# Patient Record
Sex: FEMALE | Race: BLACK OR AFRICAN AMERICAN | NOT HISPANIC OR LATINO | Employment: UNEMPLOYED | URBAN - METROPOLITAN AREA
[De-identification: names, ages, dates, MRNs, and addresses within clinical notes are randomized per-mention and may not be internally consistent; named-entity substitution may affect disease eponyms.]

---

## 2017-11-13 ENCOUNTER — TRANSCRIBE ORDERS (OUTPATIENT)
Dept: LAB | Facility: CLINIC | Age: 52
End: 2017-11-13

## 2017-11-13 ENCOUNTER — APPOINTMENT (OUTPATIENT)
Dept: LAB | Facility: CLINIC | Age: 52
End: 2017-11-13
Payer: COMMERCIAL

## 2017-11-13 ENCOUNTER — GENERIC CONVERSION - ENCOUNTER (OUTPATIENT)
Dept: OTHER | Facility: OTHER | Age: 52
End: 2017-11-13

## 2017-11-13 DIAGNOSIS — I10 ESSENTIAL (PRIMARY) HYPERTENSION: ICD-10-CM

## 2017-11-13 DIAGNOSIS — D50.9 IRON DEFICIENCY ANEMIA: ICD-10-CM

## 2017-11-13 LAB
ALBUMIN SERPL BCP-MCNC: 4.2 G/DL (ref 3.5–5)
ALP SERPL-CCNC: 64 U/L (ref 46–116)
ALT SERPL W P-5'-P-CCNC: 16 U/L (ref 12–78)
ANION GAP SERPL CALCULATED.3IONS-SCNC: 10 MMOL/L (ref 4–13)
AST SERPL W P-5'-P-CCNC: 14 U/L (ref 5–45)
BASOPHILS # BLD AUTO: 0.02 THOUSANDS/ΜL (ref 0–0.1)
BASOPHILS NFR BLD AUTO: 0 % (ref 0–1)
BILIRUB SERPL-MCNC: 0.49 MG/DL (ref 0.2–1)
BUN SERPL-MCNC: 12 MG/DL (ref 5–25)
CALCIUM SERPL-MCNC: 9.6 MG/DL (ref 8.3–10.1)
CHLORIDE SERPL-SCNC: 104 MMOL/L (ref 100–108)
CHOLEST SERPL-MCNC: 190 MG/DL (ref 50–200)
CO2 SERPL-SCNC: 22 MMOL/L (ref 21–32)
CREAT SERPL-MCNC: 0.84 MG/DL (ref 0.6–1.3)
EOSINOPHIL # BLD AUTO: 0.16 THOUSAND/ΜL (ref 0–0.61)
EOSINOPHIL NFR BLD AUTO: 3 % (ref 0–6)
ERYTHROCYTE [DISTWIDTH] IN BLOOD BY AUTOMATED COUNT: 13.8 % (ref 11.6–15.1)
GFR SERPL CREATININE-BSD FRML MDRD: 93 ML/MIN/1.73SQ M
GLUCOSE P FAST SERPL-MCNC: 78 MG/DL (ref 65–99)
HCT VFR BLD AUTO: 39 % (ref 34.8–46.1)
HDLC SERPL-MCNC: 58 MG/DL (ref 40–60)
HGB BLD-MCNC: 12.4 G/DL (ref 11.5–15.4)
LDLC SERPL CALC-MCNC: 120 MG/DL (ref 0–100)
LYMPHOCYTES # BLD AUTO: 1.22 THOUSANDS/ΜL (ref 0.6–4.47)
LYMPHOCYTES NFR BLD AUTO: 22 % (ref 14–44)
MCH RBC QN AUTO: 25.6 PG (ref 26.8–34.3)
MCHC RBC AUTO-ENTMCNC: 31.8 G/DL (ref 31.4–37.4)
MCV RBC AUTO: 81 FL (ref 82–98)
MONOCYTES # BLD AUTO: 0.44 THOUSAND/ΜL (ref 0.17–1.22)
MONOCYTES NFR BLD AUTO: 8 % (ref 4–12)
NEUTROPHILS # BLD AUTO: 3.68 THOUSANDS/ΜL (ref 1.85–7.62)
NEUTS SEG NFR BLD AUTO: 67 % (ref 43–75)
NRBC BLD AUTO-RTO: 0 /100 WBCS
PLATELET # BLD AUTO: 315 THOUSANDS/UL (ref 149–390)
PMV BLD AUTO: 12.7 FL (ref 8.9–12.7)
POTASSIUM SERPL-SCNC: 3.3 MMOL/L (ref 3.5–5.3)
PROT SERPL-MCNC: 8.8 G/DL (ref 6.4–8.2)
RBC # BLD AUTO: 4.84 MILLION/UL (ref 3.81–5.12)
SODIUM SERPL-SCNC: 136 MMOL/L (ref 136–145)
TRIGL SERPL-MCNC: 58 MG/DL
TSH SERPL DL<=0.05 MIU/L-ACNC: 1.3 UIU/ML (ref 0.36–3.74)
WBC # BLD AUTO: 5.53 THOUSAND/UL (ref 4.31–10.16)

## 2017-11-13 PROCEDURE — 36415 COLL VENOUS BLD VENIPUNCTURE: CPT

## 2017-11-13 PROCEDURE — 80053 COMPREHEN METABOLIC PANEL: CPT

## 2017-11-13 PROCEDURE — 85025 COMPLETE CBC W/AUTO DIFF WBC: CPT

## 2017-11-13 PROCEDURE — 84443 ASSAY THYROID STIM HORMONE: CPT

## 2017-11-13 PROCEDURE — 80061 LIPID PANEL: CPT

## 2017-12-16 ENCOUNTER — ALLSCRIPTS OFFICE VISIT (OUTPATIENT)
Dept: OTHER | Facility: OTHER | Age: 52
End: 2017-12-16

## 2017-12-17 NOTE — PROGRESS NOTES
Assessment  1  Uncontrolled hypertension (401 9) (I10)    Plan  Uncontrolled hypertension    · Amlodipine Besylate-Valsartan  MG Oral Tablet; Take 1 tablet daily    Discussion/Summary  Discussion Summary:   Hypertension -her blood pressure was still quite high  She has been taking the amlodipine  Valsartan 160 milligrams was added  She will come back in 2 months for recheck  Counseling Documentation With Imm: The patient was counseled regarding instructions for management,-- risk factor reductions,-- risks and benefits of treatment options,-- importance of compliance with treatment  Medication SE Review and Pt Understands Tx: Possible side effects of new medications were reviewed with the patient/guardian today  The treatment plan was reviewed with the patient/guardian  The patient/guardian understands and agrees with the treatment plan      Chief Complaint  Chief Complaint Free Text Note Form: BP fu      History of Present Illness  Hypertension (Follow-Up): The patient presents for follow-up of essential hypertension  The patient states she has been doing poorly with her blood pressure control since the last visit  She has no comorbid illnesses  She has no significant interval events  Symptoms: The patient is currently asymptomatic  Medications: the patient is adherent with her medication regimen  -- She denies medication side effects  Review of Systems  Complete-Female:  Constitutional: No fever, no chills, feels well, no tiredness, no recent weight gain or weight loss  Eyes: No complaints of eye pain, no red eyes, no eyesight problems, no discharge, no dry eyes, no itching of eyes  ENT: no complaints of earache, no loss of hearing, no nose bleeds, no nasal discharge, no sore throat, no hoarseness  Cardiovascular: No complaints of slow heart rate, no fast heart rate, no chest pain, no palpitations, no leg claudication, no lower extremity edema    Respiratory: No complaints of shortness of breath, no wheezing, no cough, no SOB on exertion, no orthopnea, no PND  Gastrointestinal: No complaints of abdominal pain, no constipation, no nausea or vomiting, no diarrhea, no bloody stools  Genitourinary: No complaints of dysuria, no incontinence, no pelvic pain, no dysmenorrhea, no vaginal discharge or bleeding  Musculoskeletal: No complaints of arthralgias, no myalgias, no joint swelling or stiffness, no limb pain or swelling  Integumentary: No complaints of skin rash or lesions, no itching, no skin wounds, no breast pain or lump  Neurological: No complaints of headache, no confusion, no convulsions, no numbness, no dizziness or fainting, no tingling, no limb weakness, no difficulty walking  Psychiatric: Not suicidal, no sleep disturbance, no anxiety or depression, no change in personality, no emotional problems  Endocrine: No complaints of proptosis, no hot flashes, no muscle weakness, no deepening of the voice, no feelings of weakness  Hematologic/Lymphatic: No complaints of swollen glands, no swollen glands in the neck, does not bleed easily, does not bruise easily  Active Problems  1  Benign essential hypertension (401 1) (I10)   2  Cataract (366 9) (H26 9)   3  Iron deficiency anemia (280 9) (D50 9)   4  Uncontrolled hypertension (401 9) (I10)    Past Medical History  1  History of Denial (799 29) (R45 89)  Active Problems And Past Medical History Reviewed: The active problems and past medical history were reviewed and updated today  Surgical History  1  Denied: History of Recent Surgery    Family History  Mother    1  Family history of hypertension (V17 49) (Z82 49)  Father    2  Family history of hypertension (V17 49) (Z82 49)    Social History   · No alcohol use   · Non-tobacco user (V49 89) (Z78 9)  Social History Reviewed: The social history was reviewed and updated today  The social history was reviewed and is unchanged  Current Meds   1   AmLODIPine Besylate 10 MG Oral Tablet; TAKE 1 TABLET DAILY; Therapy: [id number] to (Jason Jarvis)  Requested for: 12ULK1282; Last Rx:[id number] Ordered   2  Ferrous Sulfate 325 (65 Fe) MG Oral Tablet; TAKE ONE TABLET BY MOUTH TWICE DAILY; Therapy: [id number] to Recorded  Medication List Reviewed: The medication list was reviewed and updated today  Allergies  1  No Known Drug Allergies    Vitals  Vital Signs    Recorded: 28GPN3676 12:13PM   Temperature 98 F   Heart Rate 103   Respiration 16   Systolic 710   Diastolic 524   Height 5 ft 4 in   Weight 138 lb 4 00 oz   BMI Calculated 23 73   BSA Calculated 1 68   O2 Saturation 98     Physical Exam   Constitutional  General appearance: No acute distress, well appearing and well nourished  Eyes  Conjunctiva and lids: No swelling, erythema or discharge  Ears, Nose, Mouth, and Throat  External inspection of ears and nose: Normal    Oropharynx: Normal with no erythema, edema, exudate or lesions  Pulmonary  Respiratory effort: No increased work of breathing or signs of respiratory distress  Auscultation of lungs: Clear to auscultation  Cardiovascular  Palpation of heart: Normal PMI, no thrills  Auscultation of heart: Normal rate and rhythm, normal S1 and S2, without murmurs  Examination of extremities for edema and/or varicosities: Normal    Abdomen  Abdomen: Non-tender, no masses  Musculoskeletal  Gait and station: Normal    Skin  Skin and subcutaneous tissue: Normal without rashes or lesions  Neurologic  Cranial nerves: Cranial nerves 2-12 intact  Psychiatric  Orientation to person, place, and time: Normal    Mood and affect: Normal          Results/Data  Health Maintenance Flow Sheet [id number] 12:38PM      Test Name Result Flag Reference   Pap      faxed to [facility] K#:[id number]  Chloé Ventura        Future Appointments    Date/Time Provider Specialty Site   02/24/2018 10:30 AM Gabino Chamorro [facility] [address] Electronically signed by : NIMA Moe ; Dec 16 2017  2:08PM EST                       (Author)

## 2018-01-22 VITALS
HEIGHT: 64 IN | BODY MASS INDEX: 23.6 KG/M2 | HEART RATE: 106 BPM | DIASTOLIC BLOOD PRESSURE: 150 MMHG | RESPIRATION RATE: 16 BRPM | SYSTOLIC BLOOD PRESSURE: 252 MMHG | WEIGHT: 138.25 LBS

## 2018-01-22 VITALS
OXYGEN SATURATION: 98 % | TEMPERATURE: 98 F | HEIGHT: 64 IN | SYSTOLIC BLOOD PRESSURE: 210 MMHG | WEIGHT: 138.25 LBS | BODY MASS INDEX: 23.6 KG/M2 | HEART RATE: 103 BPM | RESPIRATION RATE: 16 BRPM | DIASTOLIC BLOOD PRESSURE: 110 MMHG

## 2018-02-24 ENCOUNTER — OFFICE VISIT (OUTPATIENT)
Dept: INTERNAL MEDICINE CLINIC | Facility: CLINIC | Age: 53
End: 2018-02-24
Payer: COMMERCIAL

## 2018-02-24 VITALS
HEART RATE: 88 BPM | BODY MASS INDEX: 25.12 KG/M2 | TEMPERATURE: 97.9 F | WEIGHT: 141.8 LBS | OXYGEN SATURATION: 99 % | SYSTOLIC BLOOD PRESSURE: 204 MMHG | DIASTOLIC BLOOD PRESSURE: 92 MMHG | HEIGHT: 63 IN

## 2018-02-24 DIAGNOSIS — I10 UNCONTROLLED HYPERTENSION: Primary | ICD-10-CM

## 2018-02-24 PROBLEM — D50.9 IRON DEFICIENCY ANEMIA: Status: ACTIVE | Noted: 2017-11-13

## 2018-02-24 PROBLEM — H26.9 CATARACT: Status: ACTIVE | Noted: 2017-11-13

## 2018-02-24 PROCEDURE — 99214 OFFICE O/P EST MOD 30 MIN: CPT | Performed by: PHYSICIAN ASSISTANT

## 2018-02-24 RX ORDER — METOPROLOL SUCCINATE 50 MG/1
50 TABLET, EXTENDED RELEASE ORAL DAILY
Qty: 30 TABLET | Refills: 5 | Status: SHIPPED | OUTPATIENT
Start: 2018-02-24 | End: 2018-03-31

## 2018-02-24 RX ORDER — LOSARTAN POTASSIUM 100 MG/1
1 TABLET ORAL DAILY
COMMUNITY
Start: 2017-12-20 | End: 2018-04-14

## 2018-02-24 RX ORDER — FERROUS SULFATE 325(65) MG
1 TABLET ORAL 2 TIMES DAILY
COMMUNITY
Start: 2017-11-13 | End: 2020-08-25 | Stop reason: SDUPTHER

## 2018-02-24 RX ORDER — AMLODIPINE BESYLATE 10 MG/1
1 TABLET ORAL DAILY
COMMUNITY
Start: 2017-11-13 | End: 2018-07-12 | Stop reason: SDUPTHER

## 2018-02-24 NOTE — PROGRESS NOTES
Assessment/Plan:    Uncontrolled hypertension-    The patient will be started on metoprolol ER 50 mg daily  She should come back here in 2-3 weeks for blood pressure check and bring in readings from home  Hopefully at that time we can clear her for cataract surgery  Continue current medications including amlodipine and losartan  The case was discussed with Dr Merle Boudreaux who agrees with the assessment and plan  No problem-specific Assessment & Plan notes found for this encounter  Diagnoses and all orders for this visit:    Uncontrolled hypertension  -     metoprolol succinate (TOPROL-XL) 50 mg 24 hr tablet; Take 1 tablet (50 mg total) by mouth daily    Other orders  -     amLODIPine (NORVASC) 10 mg tablet; Take 1 tablet by mouth daily  -     Ascorbic Acid, Vitamin C, (VITAMIN C) 100 MG tablet; 1 tab(s)  -     ferrous sulfate 325 (65 Fe) mg tablet; Take 1 tablet by mouth 2 (two) times a day  -     losartan (COZAAR) 100 MG tablet; Take 1 tablet by mouth daily          Subjective:      Patient ID: Charla Peres is a 47 y o  female  BP check  Pt was a new pt here in Nov and the saw Dr Merle Boudreaux again in Dec   Her BP has not been controlled  She come today for another BP check  She has been waiting to have cataract surgery but due to her high Bp's, she cannot be cleared yet  The following portions of the patient's history were reviewed and updated as appropriate: allergies, current medications, past family history, past medical history, past social history, past surgical history and problem list     Review of Systems   Constitutional: Negative for fatigue  HENT: Negative for sinus pain and sinus pressure  Respiratory: Negative for cough and chest tightness  Cardiovascular: Negative for chest pain and palpitations  Gastrointestinal: Negative for abdominal pain, diarrhea and nausea  Neurological: Positive for headaches  Negative for dizziness and light-headedness          Sometimes headaches         Objective:      BP (!) 204/92   Pulse (!) 107   Temp 97 9 °F (36 6 °C)   Ht 5' 3" (1 6 m)   Wt 64 3 kg (141 lb 12 8 oz)   SpO2 99%   BMI 25 12 kg/m²          Physical Exam   Constitutional: She appears well-developed and well-nourished  HENT:   Head: Normocephalic and atraumatic  Cardiovascular: Normal heart sounds  Tachycardia present  Pulmonary/Chest: Effort normal and breath sounds normal  No respiratory distress  Abdominal: Soft  Bowel sounds are normal  There is no tenderness

## 2018-03-31 ENCOUNTER — OFFICE VISIT (OUTPATIENT)
Dept: INTERNAL MEDICINE CLINIC | Facility: CLINIC | Age: 53
End: 2018-03-31
Payer: COMMERCIAL

## 2018-03-31 VITALS
OXYGEN SATURATION: 98 % | TEMPERATURE: 98.3 F | WEIGHT: 142.6 LBS | HEART RATE: 91 BPM | RESPIRATION RATE: 18 BRPM | SYSTOLIC BLOOD PRESSURE: 190 MMHG | HEIGHT: 63 IN | DIASTOLIC BLOOD PRESSURE: 110 MMHG | BODY MASS INDEX: 25.27 KG/M2

## 2018-03-31 DIAGNOSIS — I10 WHITE COAT SYNDROME WITH HYPERTENSION: ICD-10-CM

## 2018-03-31 DIAGNOSIS — I10 BENIGN ESSENTIAL HYPERTENSION: Primary | ICD-10-CM

## 2018-03-31 PROCEDURE — 99214 OFFICE O/P EST MOD 30 MIN: CPT | Performed by: INTERNAL MEDICINE

## 2018-03-31 RX ORDER — CARVEDILOL 12.5 MG/1
12.5 TABLET ORAL 2 TIMES DAILY WITH MEALS
Qty: 60 TABLET | Refills: 0 | Status: SHIPPED | OUTPATIENT
Start: 2018-03-31 | End: 2018-04-27 | Stop reason: SDUPTHER

## 2018-04-14 ENCOUNTER — OFFICE VISIT (OUTPATIENT)
Dept: INTERNAL MEDICINE CLINIC | Facility: CLINIC | Age: 53
End: 2018-04-14
Payer: COMMERCIAL

## 2018-04-14 VITALS
SYSTOLIC BLOOD PRESSURE: 158 MMHG | RESPIRATION RATE: 18 BRPM | DIASTOLIC BLOOD PRESSURE: 90 MMHG | HEART RATE: 70 BPM | BODY MASS INDEX: 25.87 KG/M2 | WEIGHT: 146 LBS | HEIGHT: 63 IN | OXYGEN SATURATION: 99 %

## 2018-04-14 DIAGNOSIS — H26.9 CATARACT OF BOTH EYES, UNSPECIFIED CATARACT TYPE: ICD-10-CM

## 2018-04-14 DIAGNOSIS — I15.9 SECONDARY HYPERTENSION: Primary | ICD-10-CM

## 2018-04-14 DIAGNOSIS — Z01.818 PREOPERATIVE CLEARANCE: ICD-10-CM

## 2018-04-14 PROCEDURE — 99244 OFF/OP CNSLTJ NEW/EST MOD 40: CPT | Performed by: PHYSICIAN ASSISTANT

## 2018-04-14 RX ORDER — VALSARTAN 320 MG/1
320 TABLET ORAL DAILY
Qty: 30 TABLET | Refills: 5 | Status: SHIPPED | OUTPATIENT
Start: 2018-04-14 | End: 2020-08-17

## 2018-04-14 NOTE — PROGRESS NOTES
Assessment/Plan:     hypertension- blood pressure is better controlled today  It does seem that she may have white coat hypertension  We will switch her losartan 100mg to valsartan 320 mg daily for a little bit better efficacy  Continue Coreg 12 5 mg b i d  And amlodipine 10 mg daily  Cataract surgery- there are no contraindications and the patient is cleared for cataract surgery  I will await any forms that I have to fill out  The patient will call her eye doctor, Dr Dulce Mosher, on Monday and obtain any forms that need to be filled out  We also do not have any dates of surgery since she says she has not even seen the eye doctor for this yet  As long as the surgeries occur within the next 30 days, this clearance should apply  the case was discussed with Dr Jhonny Sandoval who agrees with the assessment and plan      No problem-specific Assessment & Plan notes found for this encounter  Diagnoses and all orders for this visit:    Secondary hypertension  -     valsartan (DIOVAN) 320 MG tablet; Take 1 tablet (320 mg total) by mouth daily          Subjective:      Patient ID: Myla Andersen is a 47 y o  female  Patient comes in for a blood pressure check  She was seen here about 2 weeks ago where her metoprolol was stopped and she was   Put on Coreg 12 5 mg twice a day  She continues to take amlodipine  10 mg daily and losartan 100 mg daily  She has been waiting to have cataract surgery  Her surgery has been put off until her blood pressure was better controlled  According to the patient she has yet to even see the eye doctor, she says they told her they she needs to come here for clearance although she does not have any forms to give me from Dr Dulce Mosher  She has been checking her blood pressures at home and she says that she brought her machine in to be checked by Dr Flynn Livers a few visits ago    Her blood pressures have been running the following at home, 137/81, 132/82, 138/85, 140/89, 106/71, 112/78, 129/77, 134/79, 127/79     here her blood pressure was elevated more at 1st, but on repeat it was  158/90      she feels well and has no acute complaints  The only complaint she has is her vision which she is anxious to get corrected by cataract surgery      Hypertension   This is a chronic problem  The current episode started more than 1 month ago  The problem has been waxing and waning since onset  Pertinent negatives include no chest pain, palpitations or shortness of breath  The following portions of the patient's history were reviewed and updated as appropriate: allergies, current medications, past family history, past medical history, past social history, past surgical history and problem list     Review of Systems   Constitutional: Negative for fatigue and fever  HENT: Negative for congestion  Eyes: Positive for visual disturbance  Respiratory: Negative for cough, chest tightness and shortness of breath  Cardiovascular: Negative for chest pain and palpitations  Gastrointestinal: Negative for abdominal pain, diarrhea and nausea  Neurological: Negative for dizziness and light-headedness  Objective:      /90   Pulse 70   Resp 18   Ht 5' 3" (1 6 m)   Wt 66 2 kg (146 lb)   SpO2 99%   BMI 25 86 kg/m²          Physical Exam   Constitutional: She is oriented to person, place, and time  She appears well-developed and well-nourished  HENT:   Head: Normocephalic and atraumatic  Right Ear: External ear normal    Left Ear: External ear normal    Mouth/Throat: Oropharynx is clear and moist    Eyes: Conjunctivae are normal  Pupils are equal, round, and reactive to light  Neck: Normal range of motion  Neck supple  Cardiovascular: Normal rate, regular rhythm and normal heart sounds  Pulmonary/Chest: Effort normal and breath sounds normal  No respiratory distress  She has no wheezes  She has no rales  Abdominal: Soft  Bowel sounds are normal  There is no tenderness  Musculoskeletal: Normal range of motion  Neurological: She is alert and oriented to person, place, and time  Psychiatric: She has a normal mood and affect   Her behavior is normal  Judgment normal

## 2018-04-17 ENCOUNTER — TELEPHONE (OUTPATIENT)
Dept: INTERNAL MEDICINE CLINIC | Facility: CLINIC | Age: 53
End: 2018-04-17

## 2018-04-17 DIAGNOSIS — H25.9 SENILE CATARACT, UNSPECIFIED AGE-RELATED CATARACT TYPE, UNSPECIFIED LATERALITY: Primary | ICD-10-CM

## 2018-04-17 NOTE — TELEPHONE ENCOUNTER
Pt  requesting a referral for Opthalmology Dr Siddiqui May, for cataract surgery consult   Pls fax over [phone]

## 2018-04-27 DIAGNOSIS — I10 BENIGN ESSENTIAL HYPERTENSION: ICD-10-CM

## 2018-04-27 RX ORDER — CARVEDILOL 12.5 MG/1
12.5 TABLET ORAL 2 TIMES DAILY WITH MEALS
Qty: 60 TABLET | Refills: 3 | Status: SHIPPED | OUTPATIENT
Start: 2018-04-27 | End: 2018-10-20 | Stop reason: SDUPTHER

## 2018-05-21 ENCOUNTER — TELEPHONE (OUTPATIENT)
Dept: INTERNAL MEDICINE CLINIC | Facility: CLINIC | Age: 53
End: 2018-05-21

## 2018-05-21 NOTE — TELEPHONE ENCOUNTER
She just rec'ed an H&P which is dated 4/14/2018    But needs to be w/in 30 days    She will fax back to [phone]  Mali Leonard Needs to have the date changed  Mali Leonard

## 2018-07-12 DIAGNOSIS — I10 ESSENTIAL HYPERTENSION: Primary | ICD-10-CM

## 2018-07-13 RX ORDER — AMLODIPINE BESYLATE 10 MG/1
TABLET ORAL
Qty: 90 TABLET | Refills: 1 | Status: SHIPPED | OUTPATIENT
Start: 2018-07-13 | End: 2019-02-05 | Stop reason: SDUPTHER

## 2018-10-20 DIAGNOSIS — I10 BENIGN ESSENTIAL HYPERTENSION: ICD-10-CM

## 2018-10-21 RX ORDER — CARVEDILOL 12.5 MG/1
12.5 TABLET ORAL 2 TIMES DAILY WITH MEALS
Qty: 60 TABLET | Refills: 3 | Status: SHIPPED | OUTPATIENT
Start: 2018-10-21 | End: 2020-08-17

## 2019-02-05 DIAGNOSIS — I10 ESSENTIAL HYPERTENSION: ICD-10-CM

## 2019-02-05 RX ORDER — AMLODIPINE BESYLATE 10 MG/1
TABLET ORAL
Qty: 90 TABLET | Refills: 1 | Status: SHIPPED | OUTPATIENT
Start: 2019-02-05 | End: 2020-08-17

## 2019-09-06 ENCOUNTER — TELEPHONE (OUTPATIENT)
Dept: INTERNAL MEDICINE CLINIC | Facility: CLINIC | Age: 54
End: 2019-09-06

## 2019-11-13 DIAGNOSIS — Z12.39 SCREENING FOR BREAST CANCER: Primary | ICD-10-CM

## 2020-01-14 ENCOUNTER — TELEPHONE (OUTPATIENT)
Dept: INTERNAL MEDICINE CLINIC | Facility: CLINIC | Age: 55
End: 2020-01-14

## 2020-05-06 ENCOUNTER — TELEPHONE (OUTPATIENT)
Dept: INTERNAL MEDICINE CLINIC | Facility: CLINIC | Age: 55
End: 2020-05-06

## 2020-08-17 ENCOUNTER — CONSULT (OUTPATIENT)
Dept: INTERNAL MEDICINE CLINIC | Facility: CLINIC | Age: 55
End: 2020-08-17
Payer: COMMERCIAL

## 2020-08-17 ENCOUNTER — TELEPHONE (OUTPATIENT)
Dept: INTERNAL MEDICINE CLINIC | Facility: CLINIC | Age: 55
End: 2020-08-17

## 2020-08-17 ENCOUNTER — APPOINTMENT (OUTPATIENT)
Dept: LAB | Facility: CLINIC | Age: 55
End: 2020-08-17
Payer: COMMERCIAL

## 2020-08-17 VITALS
OXYGEN SATURATION: 100 % | WEIGHT: 154 LBS | BODY MASS INDEX: 26.29 KG/M2 | HEART RATE: 104 BPM | TEMPERATURE: 98.7 F | HEIGHT: 64 IN | SYSTOLIC BLOOD PRESSURE: 180 MMHG | DIASTOLIC BLOOD PRESSURE: 100 MMHG

## 2020-08-17 DIAGNOSIS — H26.9 CATARACT OF RIGHT EYE, UNSPECIFIED CATARACT TYPE: ICD-10-CM

## 2020-08-17 DIAGNOSIS — I10 BENIGN ESSENTIAL HYPERTENSION: ICD-10-CM

## 2020-08-17 DIAGNOSIS — Z01.818 PREOP EXAMINATION: ICD-10-CM

## 2020-08-17 DIAGNOSIS — I10 ESSENTIAL HYPERTENSION: Primary | ICD-10-CM

## 2020-08-17 DIAGNOSIS — I10 UNCONTROLLED HYPERTENSION: ICD-10-CM

## 2020-08-17 LAB
ALBUMIN SERPL BCP-MCNC: 4.1 G/DL (ref 3.5–5)
ALP SERPL-CCNC: 73 U/L (ref 46–116)
ALT SERPL W P-5'-P-CCNC: 12 U/L (ref 12–78)
ANION GAP SERPL CALCULATED.3IONS-SCNC: 6 MMOL/L (ref 4–13)
AST SERPL W P-5'-P-CCNC: 9 U/L (ref 5–45)
BACTERIA UR QL AUTO: ABNORMAL /HPF
BILIRUB SERPL-MCNC: 0.63 MG/DL (ref 0.2–1)
BILIRUB UR QL STRIP: NEGATIVE
BUN SERPL-MCNC: 5 MG/DL (ref 5–25)
CALCIUM SERPL-MCNC: 9.3 MG/DL (ref 8.3–10.1)
CHLORIDE SERPL-SCNC: 112 MMOL/L (ref 100–108)
CHOLEST SERPL-MCNC: 155 MG/DL (ref 50–200)
CLARITY UR: ABNORMAL
CO2 SERPL-SCNC: 22 MMOL/L (ref 21–32)
COLOR UR: YELLOW
CREAT SERPL-MCNC: 0.79 MG/DL (ref 0.6–1.3)
GFR SERPL CREATININE-BSD FRML MDRD: 98 ML/MIN/1.73SQ M
GLUCOSE P FAST SERPL-MCNC: 102 MG/DL (ref 65–99)
GLUCOSE UR STRIP-MCNC: NEGATIVE MG/DL
HDLC SERPL-MCNC: 48 MG/DL
HGB UR QL STRIP.AUTO: NEGATIVE
KETONES UR STRIP-MCNC: NEGATIVE MG/DL
LDLC SERPL CALC-MCNC: 85 MG/DL (ref 0–100)
LEUKOCYTE ESTERASE UR QL STRIP: ABNORMAL
NITRITE UR QL STRIP: POSITIVE
NON-SQ EPI CELLS URNS QL MICRO: ABNORMAL /HPF
NONHDLC SERPL-MCNC: 107 MG/DL
PH UR STRIP.AUTO: 6 [PH]
POTASSIUM SERPL-SCNC: 4.2 MMOL/L (ref 3.5–5.3)
PROT SERPL-MCNC: 8.5 G/DL (ref 6.4–8.2)
PROT UR STRIP-MCNC: NEGATIVE MG/DL
RBC #/AREA URNS AUTO: ABNORMAL /HPF
SODIUM SERPL-SCNC: 140 MMOL/L (ref 136–145)
SP GR UR STRIP.AUTO: 1.01 (ref 1–1.03)
TRIGL SERPL-MCNC: 108 MG/DL
TSH SERPL DL<=0.05 MIU/L-ACNC: 1.32 UIU/ML (ref 0.36–3.74)
UROBILINOGEN UR QL STRIP.AUTO: 1 E.U./DL
WBC #/AREA URNS AUTO: ABNORMAL /HPF

## 2020-08-17 PROCEDURE — 84443 ASSAY THYROID STIM HORMONE: CPT

## 2020-08-17 PROCEDURE — 3008F BODY MASS INDEX DOCD: CPT | Performed by: PHYSICIAN ASSISTANT

## 2020-08-17 PROCEDURE — 36415 COLL VENOUS BLD VENIPUNCTURE: CPT

## 2020-08-17 PROCEDURE — 3080F DIAST BP >= 90 MM HG: CPT | Performed by: PHYSICIAN ASSISTANT

## 2020-08-17 PROCEDURE — 99214 OFFICE O/P EST MOD 30 MIN: CPT | Performed by: PHYSICIAN ASSISTANT

## 2020-08-17 PROCEDURE — 81001 URINALYSIS AUTO W/SCOPE: CPT | Performed by: PHYSICIAN ASSISTANT

## 2020-08-17 PROCEDURE — 1036F TOBACCO NON-USER: CPT | Performed by: PHYSICIAN ASSISTANT

## 2020-08-17 PROCEDURE — 85025 COMPLETE CBC W/AUTO DIFF WBC: CPT

## 2020-08-17 PROCEDURE — 80061 LIPID PANEL: CPT

## 2020-08-17 PROCEDURE — 80053 COMPREHEN METABOLIC PANEL: CPT

## 2020-08-17 PROCEDURE — 3077F SYST BP >= 140 MM HG: CPT | Performed by: PHYSICIAN ASSISTANT

## 2020-08-17 RX ORDER — CARVEDILOL 12.5 MG/1
12.5 TABLET ORAL 2 TIMES DAILY WITH MEALS
Qty: 60 TABLET | Refills: 3 | Status: SHIPPED | OUTPATIENT
Start: 2020-08-17 | End: 2021-01-15

## 2020-08-17 RX ORDER — AMLODIPINE BESYLATE 10 MG/1
10 TABLET ORAL DAILY
Qty: 90 TABLET | Refills: 1 | Status: SHIPPED | OUTPATIENT
Start: 2020-08-17 | End: 2021-04-07 | Stop reason: SDUPTHER

## 2020-08-17 NOTE — PROGRESS NOTES
Assessment/Plan: she is cleared for her upcoming cataract surgery  She is asymptomatic physical exam unremarkable except for her elevated blood pressure  She will go back on her medication today follow-up next week with her blood pressure monitoring device getting screening labs       Diagnoses and all orders for this visit:    Essential hypertension  -     amLODIPine (NORVASC) 10 mg tablet; Take 1 tablet (10 mg total) by mouth daily    Cataract of right eye, unspecified cataract type  -     CBC and differential; Future  -     Comprehensive metabolic panel; Future  -     Hemoglobin A1C; Future  -     Lipid panel; Future  -     UA w Reflex to Microscopic w Reflex to Culture  -     TSH, 3rd generation; Future    Uncontrolled hypertension    Benign essential hypertension  -     carvedilol (COREG) 12 5 mg tablet; Take 1 tablet (12 5 mg total) by mouth 2 (two) times a day with meals  -     CBC and differential; Future  -     Comprehensive metabolic panel; Future  -     Hemoglobin A1C; Future  -     Lipid panel; Future  -     UA w Reflex to Microscopic w Reflex to Culture  -     TSH, 3rd generation; Future    Preop examination  -     CBC and differential; Future  -     Comprehensive metabolic panel; Future  -     Hemoglobin A1C; Future  -     Lipid panel; Future  -     UA w Reflex to Microscopic w Reflex to Culture  -     TSH, 3rd generation; Future        No problem-specific Assessment & Plan notes found for this encounter  BMI Counseling: Body mass index is 26 43 kg/m²  The BMI is above normal  Nutrition recommendations include decreasing portion sizes  Exercise recommendations include exercising 3-5 times per week  Subjective:      Patient ID: Vincent Swenson is a 48 y o  female  She is here for surgical clearance having her right cataract removed  Decreased blurred vision on the right side  She had the left side done 2 years ago with good results  She has a long history of hypertension    She was lost to follow-up and ran out of her medication  6 months ago  She has a history of labile hypertension as well as sustained hypertension  She denies any shortness of breath chest pain palpitations dizziness headaches  She does not want to take valsartan because of concerns about side effects  She is not any more specific than that      The following portions of the patient's history were reviewed and updated as appropriate:   She has a past medical history of Hypertension  ,  does not have any pertinent problems on file  ,   has no past surgical history on file  ,  family history includes Hypertension in her father and mother  ,   reports that she has never smoked  She has never used smokeless tobacco  She reports that she does not drink alcohol or use drugs  ,  is allergic to other     Current Outpatient Medications   Medication Sig Dispense Refill    Ascorbic Acid, Vitamin C, (VITAMIN C) 100 MG tablet 1 tab(s)      amLODIPine (NORVASC) 10 mg tablet Take 1 tablet (10 mg total) by mouth daily 90 tablet 1    carvedilol (COREG) 12 5 mg tablet Take 1 tablet (12 5 mg total) by mouth 2 (two) times a day with meals 60 tablet 3    ferrous sulfate 325 (65 Fe) mg tablet Take 1 tablet by mouth 2 (two) times a day       No current facility-administered medications for this visit  Review of Systems   Constitutional: Negative for activity change, appetite change, chills, diaphoresis, fatigue, fever and unexpected weight change  HENT: Negative for congestion, dental problem, drooling, ear discharge, ear pain, facial swelling, hearing loss, nosebleeds, postnasal drip, rhinorrhea, sinus pressure, sinus pain, sneezing, sore throat, tinnitus, trouble swallowing and voice change  Eyes: Negative for photophobia, pain, discharge, redness, itching and visual disturbance  Respiratory: Negative for apnea, cough, choking, chest tightness, shortness of breath, wheezing and stridor      Cardiovascular: Negative for chest pain, palpitations and leg swelling  Gastrointestinal: Negative for abdominal distention, abdominal pain, anal bleeding, blood in stool, constipation, diarrhea, nausea, rectal pain and vomiting  Endocrine: Negative for cold intolerance, heat intolerance, polydipsia, polyphagia and polyuria  Genitourinary: Negative for decreased urine volume, difficulty urinating, dysuria, enuresis, flank pain, frequency, genital sores, hematuria and urgency  Musculoskeletal: Negative for arthralgias, back pain, gait problem, joint swelling, myalgias, neck pain and neck stiffness  Skin: Negative for color change, pallor, rash and wound  Neurological: Negative for dizziness, tremors, seizures, syncope, facial asymmetry, speech difficulty, weakness, light-headedness, numbness and headaches  Hematological: Negative for adenopathy  Does not bruise/bleed easily  Psychiatric/Behavioral: Negative for agitation, behavioral problems, confusion, decreased concentration, dysphoric mood, hallucinations, self-injury, sleep disturbance and suicidal ideas  The patient is nervous/anxious  The patient is not hyperactive  Objective:  Vitals:    08/17/20 1414   BP: (!) 180/100   BP Location: Left arm   Patient Position: Sitting   Cuff Size: Standard   Pulse: 104   Temp: 98 7 °F (37 1 °C)   SpO2: 100%   Weight: 69 9 kg (154 lb)   Height: 5' 4" (1 626 m)     Body mass index is 26 43 kg/m²  Physical Exam  Vitals signs reviewed  Constitutional:       Appearance: She is well-developed  HENT:      Head: Normocephalic  Right Ear: Tympanic membrane and external ear normal       Left Ear: Tympanic membrane and external ear normal       Nose: Nose normal       Mouth/Throat:      Mouth: Mucous membranes are moist    Eyes:      Conjunctiva/sclera: Conjunctivae normal       Pupils: Pupils are equal, round, and reactive to light  Neck:      Musculoskeletal: Neck supple  Thyroid: No thyromegaly     Cardiovascular:      Rate and Rhythm: Regular rhythm  Tachycardia present  Pulses: Normal pulses  Heart sounds: Normal heart sounds  Pulmonary:      Effort: Pulmonary effort is normal  No respiratory distress  Breath sounds: Normal breath sounds  No wheezing  Abdominal:      General: Bowel sounds are normal  There is no distension  Palpations: Abdomen is soft  Musculoskeletal: Normal range of motion  General: No swelling  Skin:     General: Skin is warm and dry  Neurological:      Mental Status: She is alert and oriented to person, place, and time  Cranial Nerves: No cranial nerve deficit  Sensory: No sensory deficit  Motor: No weakness  Coordination: Coordination normal       Deep Tendon Reflexes: Reflexes are normal and symmetric  Psychiatric:         Mood and Affect: Mood normal          Thought Content:  Thought content normal          Judgment: Judgment normal

## 2020-08-18 ENCOUNTER — TELEPHONE (OUTPATIENT)
Dept: INTERNAL MEDICINE CLINIC | Facility: CLINIC | Age: 55
End: 2020-08-18

## 2020-08-18 DIAGNOSIS — N39.0 URINARY TRACT INFECTION WITHOUT HEMATURIA, SITE UNSPECIFIED: Primary | ICD-10-CM

## 2020-08-18 LAB
BASOPHILS # BLD AUTO: 0.06 THOUSANDS/ΜL (ref 0–0.1)
BASOPHILS NFR BLD AUTO: 1 % (ref 0–1)
EOSINOPHIL # BLD AUTO: 0.1 THOUSAND/ΜL (ref 0–0.61)
EOSINOPHIL NFR BLD AUTO: 1 % (ref 0–6)
ERYTHROCYTE [DISTWIDTH] IN BLOOD BY AUTOMATED COUNT: 28.7 % (ref 11.6–15.1)
HCT VFR BLD AUTO: 22.1 % (ref 34.8–46.1)
HGB BLD-MCNC: 5.5 G/DL (ref 11.5–15.4)
IMM GRANULOCYTES # BLD AUTO: 0.09 THOUSAND/UL (ref 0–0.2)
IMM GRANULOCYTES NFR BLD AUTO: 1 % (ref 0–2)
LYMPHOCYTES # BLD AUTO: 1.17 THOUSANDS/ΜL (ref 0.6–4.47)
LYMPHOCYTES NFR BLD AUTO: 14 % (ref 14–44)
MCH RBC QN AUTO: 15.4 PG (ref 26.8–34.3)
MCHC RBC AUTO-ENTMCNC: 24.9 G/DL (ref 31.4–37.4)
MCV RBC AUTO: 62 FL (ref 82–98)
MONOCYTES # BLD AUTO: 0.73 THOUSAND/ΜL (ref 0.17–1.22)
MONOCYTES NFR BLD AUTO: 9 % (ref 4–12)
NEUTROPHILS # BLD AUTO: 6.12 THOUSANDS/ΜL (ref 1.85–7.62)
NEUTS SEG NFR BLD AUTO: 74 % (ref 43–75)
NRBC BLD AUTO-RTO: 0 /100 WBCS
PLATELET # BLD AUTO: 261 THOUSANDS/UL (ref 149–390)
RBC # BLD AUTO: 3.56 MILLION/UL (ref 3.81–5.12)
WBC # BLD AUTO: 8.27 THOUSAND/UL (ref 4.31–10.16)

## 2020-08-18 NOTE — TELEPHONE ENCOUNTER
----- Message from Caren England PA-C sent at 8/18/2020 10:30 AM EDT -----  I left a message on her phone to give me a call please send her a letter today asking her to get in touch with this office as soon as possible thanks

## 2020-08-24 ENCOUNTER — HOSPITAL ENCOUNTER (OUTPATIENT)
Facility: HOSPITAL | Age: 55
Setting detail: OBSERVATION
Discharge: HOME | End: 2020-08-25
Attending: EMERGENCY MEDICINE | Admitting: STUDENT IN AN ORGANIZED HEALTH CARE EDUCATION/TRAINING PROGRAM
Payer: COMMERCIAL

## 2020-08-24 ENCOUNTER — OFFICE VISIT (OUTPATIENT)
Dept: INTERNAL MEDICINE CLINIC | Facility: CLINIC | Age: 55
End: 2020-08-24
Payer: COMMERCIAL

## 2020-08-24 VITALS
HEART RATE: 84 BPM | HEIGHT: 64 IN | DIASTOLIC BLOOD PRESSURE: 74 MMHG | BODY MASS INDEX: 26.12 KG/M2 | WEIGHT: 153 LBS | TEMPERATURE: 97.6 F | OXYGEN SATURATION: 98 % | SYSTOLIC BLOOD PRESSURE: 160 MMHG

## 2020-08-24 DIAGNOSIS — H26.9 CATARACT OF RIGHT EYE, UNSPECIFIED CATARACT TYPE: ICD-10-CM

## 2020-08-24 DIAGNOSIS — D50.0 BLOOD LOSS ANEMIA: ICD-10-CM

## 2020-08-24 DIAGNOSIS — D50.9 IRON DEFICIENCY ANEMIA, UNSPECIFIED IRON DEFICIENCY ANEMIA TYPE: Primary | ICD-10-CM

## 2020-08-24 DIAGNOSIS — I10 UNCONTROLLED HYPERTENSION: ICD-10-CM

## 2020-08-24 DIAGNOSIS — I10 ESSENTIAL HYPERTENSION: ICD-10-CM

## 2020-08-24 DIAGNOSIS — D64.9 ANEMIA, UNSPECIFIED TYPE: Primary | ICD-10-CM

## 2020-08-24 PROBLEM — N30.00 ACUTE CYSTITIS WITHOUT HEMATURIA: Status: ACTIVE | Noted: 2020-08-24

## 2020-08-24 LAB
ABO GROUP BLD: NORMAL
ABO GROUP BLD: NORMAL
ANION GAP SERPL CALCULATED.3IONS-SCNC: 5 MMOL/L (ref 4–13)
ANISOCYTOSIS BLD QL SMEAR: PRESENT
APTT PPP: 31 SECONDS (ref 23–37)
BASOPHILS # BLD MANUAL: 0.11 THOUSAND/UL (ref 0–0.1)
BASOPHILS NFR MAR MANUAL: 1 % (ref 0–1)
BLD GP AB SCN SERPL QL: NEGATIVE
BUN SERPL-MCNC: 17 MG/DL (ref 5–25)
CALCIUM SERPL-MCNC: 9.4 MG/DL (ref 8.3–10.1)
CHLORIDE SERPL-SCNC: 103 MMOL/L (ref 100–108)
CO2 SERPL-SCNC: 28 MMOL/L (ref 21–32)
CREAT SERPL-MCNC: 1.04 MG/DL (ref 0.6–1.3)
EOSINOPHIL # BLD MANUAL: 0.11 THOUSAND/UL (ref 0–0.4)
EOSINOPHIL NFR BLD MANUAL: 1 % (ref 0–6)
ERYTHROCYTE [DISTWIDTH] IN BLOOD BY AUTOMATED COUNT: 27.9 % (ref 11.6–15.1)
GFR SERPL CREATININE-BSD FRML MDRD: 70 ML/MIN/1.73SQ M
GLUCOSE SERPL-MCNC: 114 MG/DL (ref 65–140)
HCT VFR BLD AUTO: 23.9 % (ref 34.8–46.1)
HGB BLD-MCNC: 5.6 G/DL (ref 11.5–15.4)
HYPERCHROMIA BLD QL SMEAR: PRESENT
INR PPP: 1.18 (ref 0.84–1.19)
LG PLATELETS BLD QL SMEAR: PRESENT
LYMPHOCYTES # BLD AUTO: 0.84 THOUSAND/UL (ref 0.6–4.47)
LYMPHOCYTES # BLD AUTO: 8 % (ref 14–44)
MCH RBC QN AUTO: 14.1 PG (ref 26.8–34.3)
MCHC RBC AUTO-ENTMCNC: 23.4 G/DL (ref 31.4–37.4)
MCV RBC AUTO: 60 FL (ref 82–98)
MONOCYTES # BLD AUTO: 0.53 THOUSAND/UL (ref 0–1.22)
MONOCYTES NFR BLD: 5 % (ref 4–12)
NEUTROPHILS # BLD MANUAL: 8.97 THOUSAND/UL (ref 1.85–7.62)
NEUTS SEG NFR BLD AUTO: 85 % (ref 43–75)
NRBC BLD AUTO-RTO: 0 /100 WBCS
PLATELET # BLD AUTO: 815 THOUSANDS/UL (ref 149–390)
PLATELET BLD QL SMEAR: ABNORMAL
POTASSIUM SERPL-SCNC: 4.1 MMOL/L (ref 3.5–5.3)
PROTHROMBIN TIME: 14.5 SECONDS (ref 11.6–14.5)
RBC # BLD AUTO: 3.97 MILLION/UL (ref 3.81–5.12)
RH BLD: POSITIVE
RH BLD: POSITIVE
SODIUM SERPL-SCNC: 136 MMOL/L (ref 136–145)
SPECIMEN EXPIRATION DATE: NORMAL
TOTAL CELLS COUNTED SPEC: 100
WBC # BLD AUTO: 10.55 THOUSAND/UL (ref 4.31–10.16)

## 2020-08-24 PROCEDURE — 1036F TOBACCO NON-USER: CPT | Performed by: PHYSICIAN ASSISTANT

## 2020-08-24 PROCEDURE — 85007 BL SMEAR W/DIFF WBC COUNT: CPT | Performed by: EMERGENCY MEDICINE

## 2020-08-24 PROCEDURE — 86920 COMPATIBILITY TEST SPIN: CPT

## 2020-08-24 PROCEDURE — 85610 PROTHROMBIN TIME: CPT | Performed by: EMERGENCY MEDICINE

## 2020-08-24 PROCEDURE — 99214 OFFICE O/P EST MOD 30 MIN: CPT | Performed by: PHYSICIAN ASSISTANT

## 2020-08-24 PROCEDURE — 83550 IRON BINDING TEST: CPT | Performed by: EMERGENCY MEDICINE

## 2020-08-24 PROCEDURE — 36415 COLL VENOUS BLD VENIPUNCTURE: CPT | Performed by: EMERGENCY MEDICINE

## 2020-08-24 PROCEDURE — 85027 COMPLETE CBC AUTOMATED: CPT | Performed by: EMERGENCY MEDICINE

## 2020-08-24 PROCEDURE — 99220 PR INITIAL OBSERVATION CARE/DAY 70 MINUTES: CPT | Performed by: PHYSICIAN ASSISTANT

## 2020-08-24 PROCEDURE — 99284 EMERGENCY DEPT VISIT MOD MDM: CPT

## 2020-08-24 PROCEDURE — 3078F DIAST BP <80 MM HG: CPT | Performed by: PHYSICIAN ASSISTANT

## 2020-08-24 PROCEDURE — 86850 RBC ANTIBODY SCREEN: CPT | Performed by: EMERGENCY MEDICINE

## 2020-08-24 PROCEDURE — 85730 THROMBOPLASTIN TIME PARTIAL: CPT | Performed by: EMERGENCY MEDICINE

## 2020-08-24 PROCEDURE — 86900 BLOOD TYPING SEROLOGIC ABO: CPT | Performed by: EMERGENCY MEDICINE

## 2020-08-24 PROCEDURE — 82728 ASSAY OF FERRITIN: CPT | Performed by: EMERGENCY MEDICINE

## 2020-08-24 PROCEDURE — 3008F BODY MASS INDEX DOCD: CPT | Performed by: PHYSICIAN ASSISTANT

## 2020-08-24 PROCEDURE — 86901 BLOOD TYPING SEROLOGIC RH(D): CPT | Performed by: EMERGENCY MEDICINE

## 2020-08-24 PROCEDURE — 80048 BASIC METABOLIC PNL TOTAL CA: CPT | Performed by: EMERGENCY MEDICINE

## 2020-08-24 PROCEDURE — 3077F SYST BP >= 140 MM HG: CPT | Performed by: PHYSICIAN ASSISTANT

## 2020-08-24 PROCEDURE — 83540 ASSAY OF IRON: CPT | Performed by: EMERGENCY MEDICINE

## 2020-08-24 PROCEDURE — 99284 EMERGENCY DEPT VISIT MOD MDM: CPT | Performed by: EMERGENCY MEDICINE

## 2020-08-24 PROCEDURE — P9016 RBC LEUKOCYTES REDUCED: HCPCS

## 2020-08-24 RX ORDER — ONDANSETRON 2 MG/ML
4 INJECTION INTRAMUSCULAR; INTRAVENOUS EVERY 6 HOURS PRN
Status: DISCONTINUED | OUTPATIENT
Start: 2020-08-24 | End: 2020-08-25 | Stop reason: HOSPADM

## 2020-08-24 RX ORDER — AMLODIPINE BESYLATE 5 MG/1
10 TABLET ORAL DAILY
Status: DISCONTINUED | OUTPATIENT
Start: 2020-08-24 | End: 2020-08-25 | Stop reason: HOSPADM

## 2020-08-24 RX ORDER — CARVEDILOL 12.5 MG/1
12.5 TABLET ORAL 2 TIMES DAILY WITH MEALS
Status: DISCONTINUED | OUTPATIENT
Start: 2020-08-24 | End: 2020-08-25 | Stop reason: HOSPADM

## 2020-08-24 RX ORDER — ASCORBIC ACID 500 MG
1000 TABLET ORAL DAILY
Status: DISCONTINUED | OUTPATIENT
Start: 2020-08-25 | End: 2020-08-25 | Stop reason: HOSPADM

## 2020-08-24 RX ORDER — AMLODIPINE BESYLATE 5 MG/1
10 TABLET ORAL DAILY
Status: DISCONTINUED | OUTPATIENT
Start: 2020-08-25 | End: 2020-08-24

## 2020-08-24 RX ORDER — FERROUS SULFATE 325(65) MG
325 TABLET ORAL 2 TIMES DAILY
Status: DISCONTINUED | OUTPATIENT
Start: 2020-08-24 | End: 2020-08-25 | Stop reason: HOSPADM

## 2020-08-24 RX ORDER — MAGNESIUM HYDROXIDE/ALUMINUM HYDROXICE/SIMETHICONE 120; 1200; 1200 MG/30ML; MG/30ML; MG/30ML
30 SUSPENSION ORAL EVERY 6 HOURS PRN
Status: DISCONTINUED | OUTPATIENT
Start: 2020-08-24 | End: 2020-08-25 | Stop reason: HOSPADM

## 2020-08-24 RX ORDER — ACETAMINOPHEN 325 MG/1
650 TABLET ORAL EVERY 4 HOURS PRN
Status: DISCONTINUED | OUTPATIENT
Start: 2020-08-24 | End: 2020-08-25 | Stop reason: HOSPADM

## 2020-08-24 RX ADMIN — AMLODIPINE BESYLATE 10 MG: 5 TABLET ORAL at 21:18

## 2020-08-24 RX ADMIN — FERROUS SULFATE TAB 325 MG (65 MG ELEMENTAL FE) 325 MG: 325 (65 FE) TAB at 21:19

## 2020-08-24 RX ADMIN — CARVEDILOL 12.5 MG: 12.5 TABLET, FILM COATED ORAL at 21:19

## 2020-08-24 NOTE — PROGRESS NOTES
Assessment/Plan:  I strongly urged her to go right to the emergency room for further workup for severe anemia  She is not sure if she will  I stressed how important this is for her life  I ordered a CBC and a stool for blood NK she does not go to the ER she should postpone her cataract surgery       Diagnoses and all orders for this visit:    Iron deficiency anemia, unspecified iron deficiency anemia type  -     CBC and differential; Future  -     Occult Blood, Fecal Immunochemical; Future  -     Transfer to other facility    Essential hypertension    Cataract of right eye, unspecified cataract type    Uncontrolled hypertension        No problem-specific Assessment & Plan notes found for this encounter  Subjective:      Patient ID: Patricia Reis is a 48 y o  female  She is back for follow-up today  She was seen a week ago for clearance for cataract surgery her blood pressure was elevated  I adjusted her meds and got some screening labs  Surprisingly her hemoglobin was 5 5  Attempts to reach her by phone failed and a letter was sent with no response  She is here today for follow-up of her blood pressure  She denies shortness of breath chest pain palpitations dizziness or fatigue  She has had problems with anemia over the last 2 years related to vaginal bleeding  She is having some vaginal bleeding currently  She does not follow with gyn  She has not been transfused she has been treated with iron in the past she has noted no melena or hematochezia  her blood pressure has been well controlled at home  Since her last visit      The following portions of the patient's history were reviewed and updated as appropriate:   She has a past medical history of Hypertension  ,  does not have any pertinent problems on file  ,   has no past surgical history on file  ,  family history includes Hypertension in her father and mother  ,   reports that she has never smoked   She has never used smokeless tobacco  She reports that she does not drink alcohol or use drugs  ,  is allergic to other     Current Outpatient Medications   Medication Sig Dispense Refill    amLODIPine (NORVASC) 10 mg tablet Take 1 tablet (10 mg total) by mouth daily 90 tablet 1    Ascorbic Acid, Vitamin C, (VITAMIN C) 100 MG tablet 1 tab(s)      carvedilol (COREG) 12 5 mg tablet Take 1 tablet (12 5 mg total) by mouth 2 (two) times a day with meals 60 tablet 3    ferrous sulfate 325 (65 Fe) mg tablet Take 1 tablet by mouth 2 (two) times a day       No current facility-administered medications for this visit  Review of Systems   Constitutional: Negative for activity change, appetite change, chills, diaphoresis, fatigue, fever and unexpected weight change  HENT: Negative for congestion, dental problem, drooling, ear discharge, ear pain, facial swelling, hearing loss, nosebleeds, postnasal drip, rhinorrhea, sinus pressure, sinus pain, sneezing, sore throat, tinnitus, trouble swallowing and voice change  Eyes: Positive for visual disturbance  Negative for photophobia, pain, discharge, redness and itching  Respiratory: Negative for apnea, cough, choking, chest tightness, shortness of breath, wheezing and stridor  Cardiovascular: Negative for chest pain, palpitations and leg swelling  Gastrointestinal: Negative for abdominal distention, abdominal pain, anal bleeding, blood in stool, constipation, diarrhea, nausea, rectal pain and vomiting  Endocrine: Negative for cold intolerance, heat intolerance, polydipsia, polyphagia and polyuria  Genitourinary: Negative for decreased urine volume, difficulty urinating, dysuria, enuresis, flank pain, frequency, genital sores, hematuria and urgency  Musculoskeletal: Negative for arthralgias, back pain, gait problem, joint swelling, myalgias, neck pain and neck stiffness  Skin: Negative for color change, pallor, rash and wound     Neurological: Negative for dizziness, tremors, seizures, syncope, facial asymmetry, speech difficulty, weakness, light-headedness, numbness and headaches  Hematological: Negative for adenopathy  Does not bruise/bleed easily  Psychiatric/Behavioral: Negative for agitation, behavioral problems, confusion, decreased concentration, dysphoric mood, hallucinations, self-injury, sleep disturbance and suicidal ideas  The patient is not nervous/anxious and is not hyperactive  Objective:  Vitals:    08/24/20 1446   BP: 160/74   BP Location: Left arm   Patient Position: Sitting   Cuff Size: Standard   Pulse: 84   Temp: 97 6 °F (36 4 °C)   SpO2: 98%   Weight: 69 4 kg (153 lb)   Height: 5' 4" (1 626 m)     Body mass index is 26 26 kg/m²  Physical Exam  Vitals signs reviewed  Constitutional:       Appearance: She is well-developed  HENT:      Head: Normocephalic  Right Ear: Tympanic membrane and external ear normal       Left Ear: Tympanic membrane and external ear normal       Nose: Nose normal       Mouth/Throat:      Mouth: Mucous membranes are moist    Eyes:      Pupils: Pupils are equal, round, and reactive to light  Neck:      Musculoskeletal: Neck supple  Thyroid: No thyromegaly  Cardiovascular:      Rate and Rhythm: Normal rate and regular rhythm  Pulses: Normal pulses  Heart sounds: Normal heart sounds  Pulmonary:      Effort: Pulmonary effort is normal  No respiratory distress  Breath sounds: Normal breath sounds  No wheezing or rales  Abdominal:      General: Abdomen is flat  Bowel sounds are normal       Palpations: Abdomen is soft  Musculoskeletal: Normal range of motion  Skin:     General: Skin is warm and dry  Coloration: Skin is pale  Skin is not jaundiced  Findings: No bruising, lesion or rash  Neurological:      General: No focal deficit present  Mental Status: She is alert and oriented to person, place, and time  Deep Tendon Reflexes: Reflexes are normal and symmetric     Psychiatric:         Mood and Affect: Mood normal          Thought Content:  Thought content normal          Judgment: Judgment normal

## 2020-08-24 NOTE — ASSESSMENT & PLAN NOTE
· BP adequately controlled on current regimen which was initiated 1 week ago per pt  · Continue home dose Norvasc and Coreg  · Monitor BP per unit protocol

## 2020-08-24 NOTE — ASSESSMENT & PLAN NOTE
· Had Lt cataract extracted 2 years ago  Currently undergoing pre-op clearance as outpt for Rt cataract extraction    · Keep outpt appts

## 2020-08-24 NOTE — ED PROVIDER NOTES
Pt Name: Katie Dillard  MRN: [id number]  [place] 1965  Age/Sex: 48 y o  female  Date of evaluation: 8/24/2020  PCP: Jaja Winter MD    [address] [place] [address]    Chief Complaint   Patient presents with    Abnormal Lab     Patient presents to ED with co low hemoglobin  Patient states "I went for blood work last week and had my follow up today and I was told I need to come to ED for a blood transfusion " Patient co lethargy  HPI    48 y o  female presenting with abnormal labs  Patient is supposed to get right eye cataract surgery pre on 08/31, however had preop blood work done a week ago that resulted and patient was found to have significantly low hemoglobin  Therefore she was advised to come to the emergency department  Found to have hemoglobin of 5 5  She states that she had low hemoglobin 3 years ago and she was offered transfusion versus eating liver and taking iron pills, she states she was able to bring her hemoglobin up without getting a transfusion  She states she stopped taking iron pills 30 days ago  States that she gets her periods every 28 days  Start her period two days ago, it is normally heavy for 3 days in light for the last 2 for total of 5 days  States she goes through 3 pads a day  Reports some mild crampy lower abdominal pain  Denies blood in stool or any bleeding elsewhere  Denies nausea, vomiting, fevers, chills, shortness of breath, fatigue            Past Medical and Surgical History    Past Medical History:   Diagnosis Date    Hypertension        Past Surgical History:   Procedure Laterality Date    CATARACT EXTRACTION      left       Family History   Problem Relation Age of Onset    Hypertension Mother     Hypertension Father        Social History     Tobacco Use    Smoking status: Never Smoker    Smokeless tobacco: Never Used    Tobacco comment: non tobacco user   Substance Use Topics    Alcohol use: No    Drug use: No           Allergies    Allergies Allergen Reactions    Other      Other reaction(s): Unknown       Home Medications    Prior to Admission medications    Medication Sig Start Date End Date Taking? Authorizing Provider   amLODIPine (NORVASC) 10 mg tablet Take 1 tablet (10 mg total) by mouth daily 8/17/20   Taylor Niño PA-C   Ascorbic Acid, Vitamin C, (VITAMIN C) 100 MG tablet 1 tab(s) 3/23/17   Historical Provider, MD   carvedilol (COREG) 12 5 mg tablet Take 1 tablet (12 5 mg total) by mouth 2 (two) times a day with meals 8/17/20   Taylor Niño PA-C   ferrous sulfate 325 (65 Fe) mg tablet Take 1 tablet by mouth 2 (two) times a day 11/13/17   Historical Provider, MD           Review of Systems    Review of Systems   Constitutional: Negative for chills and fever  HENT: Negative for rhinorrhea and sore throat  Eyes: Negative for pain and visual disturbance  Respiratory: Negative for cough and shortness of breath  Cardiovascular: Negative for chest pain and leg swelling  Gastrointestinal: Positive for abdominal pain  Negative for nausea and vomiting  Genitourinary: Positive for vaginal bleeding  Negative for dysuria and hematuria  Musculoskeletal: Negative for back pain and myalgias  Skin: Negative for rash and wound  Neurological: Negative for syncope and headaches  All other systems reviewed and negative  Physical Exam      ED Triage Vitals [08/24/20 1649]   Temperature Pulse Respirations Blood Pressure SpO2   (!) 97 4 °F (36 3 °C) 86 18 156/67 100 %      Temp Source Heart Rate Source Patient Position - Orthostatic VS BP Location FiO2 (%)   Temporal Monitor Sitting Left arm --      Pain Score       --               Physical Exam  Constitutional:       General: She is not in acute distress  Appearance: She is not ill-appearing or diaphoretic  HENT:      Head: Normocephalic and atraumatic        Nose: Nose normal       Mouth/Throat:      Mouth: Mucous membranes are moist    Eyes:      Extraocular Movements: Extraocular movements intact  Pupils: Pupils are equal, round, and reactive to light  Comments: Conjunctival pallor   Neck:      Musculoskeletal: Normal range of motion and neck supple  Cardiovascular:      Rate and Rhythm: Normal rate and regular rhythm  Pulmonary:      Effort: No respiratory distress  Breath sounds: Normal breath sounds  No wheezing  Abdominal:      General: Abdomen is flat  There is no distension  Tenderness: There is no abdominal tenderness  Genitourinary:     Rectum: Normal  Guaiac result negative  Comments: No bleeding hemorrhoids  Musculoskeletal: Normal range of motion  General: No swelling or deformity  Skin:     General: Skin is warm  Findings: No erythema  Neurological:      Mental Status: She is alert and oriented to person, place, and time  Mental status is at baseline                Diagnostic Results      Labs:    Results Reviewed     Procedure Component Value Units Date/Time    CBC and differential [[id number]]  (Abnormal) Collected:  08/24/20 1827    Lab Status:  Preliminary result Specimen:  Blood from Arm, Right Updated:  08/24/20 1906     WBC 10 55 Thousand/uL      RBC 3 97 Million/uL      Hemoglobin 5 6 g/dL      Hematocrit 23 9 %      MCV 60 fL      MCH 14 1 pg      MCHC 23 4 g/dL      RDW 27 9 %      Platelets 066 Thousands/uL      nRBC 0 /100 WBCs     Protime-INR [[id number]]  (Normal) Collected:  08/24/20 1827    Lab Status:  Final result Specimen:  Blood from Arm, Right Updated:  08/24/20 1846     Protime 14 5 seconds      INR 1 18    APTT [[id number]]  (Normal) Collected:  08/24/20 1827    Lab Status:  Final result Specimen:  Blood from Arm, Right Updated:  08/24/20 1846     PTT 31 seconds     Basic metabolic panel [[id number]] Collected:  08/24/20 1827    Lab Status:  Final result Specimen:  Blood from Arm, Right Updated:  08/24/20 1845     Sodium 136 mmol/L      Potassium 4 1 mmol/L      Chloride 103 mmol/L      CO2 28 mmol/L ANION GAP 5 mmol/L      BUN 17 mg/dL      Creatinine 1 04 mg/dL      Glucose 114 mg/dL      Calcium 9 4 mg/dL      eGFR 70 ml/min/1 73sq m     Narrative:       Larisa guidelines for Chronic Kidney Disease (CKD):     Stage 1 with normal or high GFR (GFR > 90 mL/min/1 73 square meters)    Stage 2 Mild CKD (GFR = 60-89 mL/min/1 73 square meters)    Stage 3A Moderate CKD (GFR = 45-59 mL/min/1 73 square meters)    Stage 3B Moderate CKD (GFR = 30-44 mL/min/1 73 square meters)    Stage 4 Severe CKD (GFR = 15-29 mL/min/1 73 square meters)    Stage 5 End Stage CKD (GFR <15 mL/min/1 73 square meters)  Note: GFR calculation is accurate only with a steady state creatinine    Iron Saturation % [[id number]] Collected:  08/24/20 1827    Lab Status: In process Specimen:  Blood from Arm, Right Updated:  08/24/20 1832    Ferritin [[id number]] Collected:  08/24/20 1827    Lab Status: In process Specimen:  Blood from Arm, Right Updated:  08/24/20 1832          All labs reviewed and utilized in the medical decision making process    Radiology:    No orders to display       All radiology studies independently viewed by me and interpreted by the radiologist     Procedure    Procedures        MDM    Patient presenting to the emergency department with significant anemia as described above  Outside lab hemoglobin of 5 5  I do not think this is related to her menstrual bleeding as she is only going through 3 pads a day  She is hemodynamically stable  Aside from abdominal cramps she does not complain of anything else  Will perform rectal examination to see if patient is Hemoccult positive or not  She likely does have iron deficiency anemia, order iron studies  Patient ordered 2 units of PRBCs  Consent was obtained, had an in-depth discussion of risks versus benefits, patient would like to go forward with transfusion  Will need hospitalization overnight      ED Course as of Aug 24 1912   Mon Aug 24, 2020   1839 Slightly low likely due to anemia, place warm blanket  Temperature(!): 97 4 °F (36 3 °C)   1850 PTT: 31   1851 INR: 1 18   1851 BMP clinically reassuring  1907 WBC(!): 10 55   1907 Hemoglobin(!!): 5 6   1908 Discussed with Internal Medicine, agreed with inpatient observation with telemetry  Medications - No data to display        FINAL IMPRESSION    Final diagnoses:   Anemia, unspecified type         DISPOSITION    Time reflects when diagnosis was documented in both MDM as applicable and the Disposition within this note     Time User Action Codes Description Comment    8/24/2020  7:06 PM Justin Thomas Add [D64 9] Anemia, unspecified type       ED Disposition     ED Disposition Condition Date/Time Comment    Admit Stable Mon Aug 24, 2020  7:06 PM Case was discussed with Diego High and the patient's admission status was agreed to be Admission Status: observation status to the service of Dr Tami Arguelles   Follow-up Information    None           PATIENT REFERRED TO:    No follow-up provider specified  DISCHARGE MEDICATIONS:    Patient's Medications   Discharge Prescriptions    No medications on file       No discharge procedures on file           Agnes Cochran, [address],   08/24/20 1912

## 2020-08-24 NOTE — ASSESSMENT & PLAN NOTE
· UA obtained on 8/17 for pre-op clearance revealed (+) nitrite, small leukocytes, 4-10 WBC and innumerable bacteria  Denies any specific symptoms currently  · WBC 10 55 on admission  Afebrile    · Urine culture pending  · Will initiate Rocephin daily pending culture results

## 2020-08-24 NOTE — H&P
H&P- Mali Guaman 1965, 48 y o  female MRN: [id number]    Unit/Bed#: ED 10 Encounter: [id number]    Primary Care Provider: Shayne Dennis MD   Date and time admitted to hospital: 8/24/2020  5:41 PM        * Iron deficiency anemia  Assessment & Plan  · Had pre-op labs drawn on 8/17 which revealed Hb @ 5 5  Hb on admission today stable at 5 6  MCV on admission 60  Denies any dizziness/light-headedness  States has been more fatigued, but attributed this to starting both Norvasc and Coreg 1 week ago for HTN  Denies any melena or hematochezia  States has been diagnosed with iron-deficient anemia in the past and quit taking her prescribed Fe back in March  Reports has only been taking a tablet sporadically since then  · Stool heme (-) in ED  Will check FOBT x 3   · Orders to transfuse PRBC x 2 units placed in ED  Will continue  · Restart Iron supplement BID as previously ordered  · CBC in am    Acute cystitis without hematuria  Assessment & Plan  · UA obtained on 8/17 for pre-op clearance revealed (+) nitrite, small leukocytes, 4-10 WBC and innumerable bacteria  Denies any specific symptoms currently  · WBC 10 55 on admission  Afebrile  · Urine culture pending  · Will initiate Rocephin daily pending culture results    Benign essential hypertension  Assessment & Plan  · BP adequately controlled on current regimen which was initiated 1 week ago per pt  · Continue home dose Norvasc and Coreg  · Monitor BP per unit protocol    Cataract  Assessment & Plan  · Had Lt cataract extracted 2 years ago  Currently undergoing pre-op clearance as outpt for Rt cataract extraction  · Keep outpt appts      VTE Prophylaxis: Pharmacologic VTE Prophylaxis contraindicated due to Profound anemia  / sequential compression device   Code Status: Level 1 Full Code  POLST: POLST form is not discussed and not completed at this time    Discussion with family: NA    Anticipated Length of Stay:  Patient will be admitted on an Observation basis with an anticipated length of stay of  Less than 2 midnights  Justification for Hospital Stay: See AP above    Total Time for Visit, including Counseling / Coordination of Care: 45 minutes  Greater than 50% of this total time spent on direct patient counseling and coordination of care  Chief Complaint:   Low Hb on outpt labs    History of Present Illness:    Tony Pool is a 48 y o  female who presents with low Hb  Had pre-op labs drawn on 8/17 which revealed Hb @ 5 5  Hb on admission today stable at 5 6  MCV on admission 60  Denies any dizziness/light-headedness  States has been more fatigued, but attributed this to starting both Norvasc and Coreg 1 week ago for HTN  Denies any melena or hematochezia  States has been diagnosed with iron-deficient anemia in the past and quit taking her prescribed Fe back in March  Reports has only been taking a tablet sporadically since then  Review of Systems:    Review of Systems   Constitutional: Negative for activity change, appetite change, chills and fever  HENT: Negative for congestion, ear pain, sinus pressure and sore throat  Eyes: Negative for visual disturbance  Respiratory: Negative for cough, shortness of breath and wheezing  Cardiovascular: Negative for chest pain, palpitations and leg swelling  Gastrointestinal: Positive for abdominal pain (cramping 2/2 menstruation)  Negative for constipation, diarrhea, nausea and vomiting  Genitourinary: Negative for difficulty urinating, dysuria, frequency and urgency  Musculoskeletal: Negative for neck pain and neck stiffness  Allergic/Immunologic: Negative for immunocompromised state  Neurological: Negative for dizziness, syncope, light-headedness and headaches  All other systems reviewed and are negative        Past Medical and Surgical History:     Past Medical History:   Diagnosis Date    Hypertension     Iron deficiency anemia        Past Surgical History:   Procedure Laterality Date    CATARACT EXTRACTION      left       Meds/Allergies:    Prior to Admission medications    Medication Sig Start Date End Date Taking? Authorizing Provider   amLODIPine (NORVASC) 10 mg tablet Take 1 tablet (10 mg total) by mouth daily 8/17/20   Jason Bishop PA-C   Ascorbic Acid, Vitamin C, (VITAMIN C) 100 MG tablet 1 tab(s) 3/23/17   Historical Provider, MD   carvedilol (COREG) 12 5 mg tablet Take 1 tablet (12 5 mg total) by mouth 2 (two) times a day with meals 8/17/20   Jason Bishop PA-C   ferrous sulfate 325 (65 Fe) mg tablet Take 1 tablet by mouth 2 (two) times a day 11/13/17   Historical Provider, MD     I have reviewed home medications with patient personally  Allergies: Allergies   Allergen Reactions    Other      Other reaction(s): Unknown       Social History:     Marital Status: /Civil Union   Patient Pre-hospital Living Situation: Lives with children  Patient Pre-hospital Level of Mobility: Ambulatory  Patient Pre-hospital Diet Restrictions: None  Substance Use History:   Social History     Substance and Sexual Activity   Alcohol Use No     Social History     Tobacco Use   Smoking Status Never Smoker   Smokeless Tobacco Never Used   Tobacco Comment    non tobacco user     Social History     Substance and Sexual Activity   Drug Use No       Family History:    Family History   Problem Relation Age of Onset    Hypertension Mother     Hypertension Father        Physical Exam:     Vitals:   Blood Pressure: 156/67 (08/24/20 1649)  Pulse: 86 (08/24/20 1649)  Temperature: (!) 97 4 °F (36 3 °C) (08/24/20 1649)  Temp Source: Temporal (08/24/20 1649)  Respirations: 18 (08/24/20 1649)  Weight - Scale: 69 7 kg (153 lb 10 6 oz) (08/24/20 1649)  SpO2: 100 % (08/24/20 1649)    Physical Exam  Constitutional:       General: She is not in acute distress  Appearance: Normal appearance  HENT:      Head: Normocephalic and atraumatic        Nose: Nose normal       Mouth/Throat:      Mouth: Mucous membranes are moist    Eyes:      Extraocular Movements: Extraocular movements intact  Neck:      Musculoskeletal: Normal range of motion and neck supple  Cardiovascular:      Rate and Rhythm: Normal rate and regular rhythm  Pulses: Normal pulses  Heart sounds: Normal heart sounds  No murmur  No friction rub  No gallop  Pulmonary:      Effort: Pulmonary effort is normal  No respiratory distress  Breath sounds: Normal breath sounds  No wheezing or rales  Abdominal:      General: Abdomen is flat  Palpations: Abdomen is soft  Tenderness: There is no abdominal tenderness  There is no guarding or rebound  Musculoskeletal: Normal range of motion  General: No swelling or tenderness  Skin:     General: Skin is warm and dry  Neurological:      General: No focal deficit present  Mental Status: She is alert and oriented to person, place, and time  Psychiatric:         Mood and Affect: Mood normal          Behavior: Behavior normal          Thought Content: Thought content normal          Additional Data:     Lab Results: I have personally reviewed pertinent reports  Results from last 7 days   Lab Units 08/24/20  1827   WBC Thousand/uL 10 55*   HEMOGLOBIN g/dL 5 6*   HEMATOCRIT % 23 9*   PLATELETS Thousands/uL 815*   LYMPHO PCT % 8*   MONO PCT % 5   EOS PCT % 1     Results from last 7 days   Lab Units 08/24/20  1827   SODIUM mmol/L 136   POTASSIUM mmol/L 4 1   CHLORIDE mmol/L 103   CO2 mmol/L 28   BUN mg/dL 17   CREATININE mg/dL 1 04   ANION GAP mmol/L 5   CALCIUM mg/dL 9 4   GLUCOSE RANDOM mg/dL 114     Results from last 7 days   Lab Units 08/24/20  1827   INR  1 18                   Imaging: NA    No orders to display       EKG, Pathology, and Other Studies Reviewed on Admission:   · EKG: NA    Allscripts / Epic Records Reviewed: Yes     ** Please Note: This note has been constructed using a voice recognition system   **

## 2020-08-24 NOTE — ASSESSMENT & PLAN NOTE
· Had pre-op labs drawn on 8/17 which revealed Hb @ 5 5  Hb on admission today stable at 5 6  MCV on admission 60  Denies any dizziness/light-headedness  States has been more fatigued, but attributed this to starting both Norvasc and Coreg 1 week ago for HTN  Denies any melena or hematochezia  States has been diagnosed with iron-deficient anemia in the past and quit taking her prescribed Fe back in March  Reports has only been taking a tablet sporadically since then  · Stool heme (-) in ED  Will check FOBT x 3   · Orders to transfuse PRBC x 2 units placed in ED  Will continue  · Restart Iron supplement BID as previously ordered    · CBC in am

## 2020-08-25 VITALS
DIASTOLIC BLOOD PRESSURE: 76 MMHG | WEIGHT: 153.66 LBS | BODY MASS INDEX: 26.38 KG/M2 | RESPIRATION RATE: 18 BRPM | OXYGEN SATURATION: 100 % | SYSTOLIC BLOOD PRESSURE: 174 MMHG | TEMPERATURE: 98.2 F | HEART RATE: 66 BPM

## 2020-08-25 PROBLEM — D50.0 BLOOD LOSS ANEMIA: Status: ACTIVE | Noted: 2017-11-13

## 2020-08-25 LAB
ABO GROUP BLD BPU: NORMAL
ANION GAP SERPL CALCULATED.3IONS-SCNC: 7 MMOL/L (ref 4–13)
BPU ID: NORMAL
BUN SERPL-MCNC: 17 MG/DL (ref 5–25)
CALCIUM SERPL-MCNC: 8.4 MG/DL (ref 8.3–10.1)
CHLORIDE SERPL-SCNC: 107 MMOL/L (ref 100–108)
CO2 SERPL-SCNC: 29 MMOL/L (ref 21–32)
CREAT SERPL-MCNC: 0.9 MG/DL (ref 0.6–1.3)
CROSSMATCH: NORMAL
ERYTHROCYTE [DISTWIDTH] IN BLOOD BY AUTOMATED COUNT: 32.1 % (ref 11.6–15.1)
ERYTHROCYTE [DISTWIDTH] IN BLOOD BY AUTOMATED COUNT: 32.4 % (ref 11.6–15.1)
FERRITIN SERPL-MCNC: 3 NG/ML (ref 8–388)
GFR SERPL CREATININE-BSD FRML MDRD: 84 ML/MIN/1.73SQ M
GLUCOSE P FAST SERPL-MCNC: 114 MG/DL (ref 65–99)
GLUCOSE SERPL-MCNC: 114 MG/DL (ref 65–140)
HCT VFR BLD AUTO: 29.6 % (ref 34.8–46.1)
HCT VFR BLD AUTO: 30 % (ref 34.8–46.1)
HGB BLD-MCNC: 8.2 G/DL (ref 11.5–15.4)
HGB BLD-MCNC: 8.2 G/DL (ref 11.5–15.4)
IRON SATN MFR SERPL: 2 %
IRON SERPL-MCNC: 10 UG/DL (ref 50–170)
MAGNESIUM SERPL-MCNC: 2.1 MG/DL (ref 1.6–2.6)
MCH RBC QN AUTO: 19 PG (ref 26.8–34.3)
MCH RBC QN AUTO: 19.2 PG (ref 26.8–34.3)
MCHC RBC AUTO-ENTMCNC: 27.3 G/DL (ref 31.4–37.4)
MCHC RBC AUTO-ENTMCNC: 27.7 G/DL (ref 31.4–37.4)
MCV RBC AUTO: 69 FL (ref 82–98)
MCV RBC AUTO: 69 FL (ref 82–98)
NRBC BLD AUTO-RTO: 0 /100 WBCS
NRBC BLD AUTO-RTO: 0 /100 WBCS
PLATELET # BLD AUTO: 652 THOUSANDS/UL (ref 149–390)
PLATELET # BLD AUTO: 663 THOUSANDS/UL (ref 149–390)
POTASSIUM SERPL-SCNC: 4.6 MMOL/L (ref 3.5–5.3)
RBC # BLD AUTO: 4.28 MILLION/UL (ref 3.81–5.12)
RBC # BLD AUTO: 4.32 MILLION/UL (ref 3.81–5.12)
SODIUM SERPL-SCNC: 143 MMOL/L (ref 136–145)
TIBC SERPL-MCNC: 568 UG/DL (ref 250–450)
UNIT DISPENSE STATUS: NORMAL
UNIT PRODUCT CODE: NORMAL
UNIT RH: NORMAL
WBC # BLD AUTO: 7.42 THOUSAND/UL (ref 4.31–10.16)
WBC # BLD AUTO: 9.8 THOUSAND/UL (ref 4.31–10.16)

## 2020-08-25 PROCEDURE — 83735 ASSAY OF MAGNESIUM: CPT | Performed by: PHYSICIAN ASSISTANT

## 2020-08-25 PROCEDURE — 85027 COMPLETE CBC AUTOMATED: CPT | Performed by: STUDENT IN AN ORGANIZED HEALTH CARE EDUCATION/TRAINING PROGRAM

## 2020-08-25 PROCEDURE — 85027 COMPLETE CBC AUTOMATED: CPT | Performed by: PHYSICIAN ASSISTANT

## 2020-08-25 PROCEDURE — 80048 BASIC METABOLIC PNL TOTAL CA: CPT | Performed by: PHYSICIAN ASSISTANT

## 2020-08-25 PROCEDURE — 36415 COLL VENOUS BLD VENIPUNCTURE: CPT | Performed by: PHYSICIAN ASSISTANT

## 2020-08-25 PROCEDURE — 99217 PR OBSERVATION CARE DISCHARGE MANAGEMENT: CPT | Performed by: STUDENT IN AN ORGANIZED HEALTH CARE EDUCATION/TRAINING PROGRAM

## 2020-08-25 RX ORDER — SULFAMETHOXAZOLE AND TRIMETHOPRIM 800; 160 MG/1; MG/1
1 TABLET ORAL EVERY 12 HOURS SCHEDULED
Qty: 6 TABLET | Refills: 0 | Status: SHIPPED | OUTPATIENT
Start: 2020-08-25 | End: 2020-08-28

## 2020-08-25 RX ORDER — FERROUS SULFATE 325(65) MG
1 TABLET ORAL 2 TIMES DAILY
Qty: 60 TABLET | Refills: 0 | Status: SHIPPED | OUTPATIENT
Start: 2020-08-25 | End: 2020-09-22 | Stop reason: SDUPTHER

## 2020-08-25 RX ADMIN — Medication 1000 MG: at 09:26

## 2020-08-25 RX ADMIN — FERROUS SULFATE TAB 325 MG (65 MG ELEMENTAL FE) 325 MG: 325 (65 FE) TAB at 09:26

## 2020-08-25 RX ADMIN — CARVEDILOL 12.5 MG: 12.5 TABLET, FILM COATED ORAL at 07:43

## 2020-08-25 RX ADMIN — SODIUM CHLORIDE 500 ML: 0.9 INJECTION, SOLUTION INTRAVENOUS at 03:08

## 2020-08-25 RX ADMIN — CEFTRIAXONE SODIUM 1000 MG: 10 INJECTION, POWDER, FOR SOLUTION INTRAVENOUS at 03:08

## 2020-08-25 NOTE — DISCHARGE INSTRUCTIONS
Acute Posthemorrhagic Anemia   AMBULATORY CARE:   Acute posthemorrhagic anemia  is a condition that develops when you lose a large amount of blood quickly  Anemia is a low number of red blood cells or a low amount of hemoglobin in your red blood cells  Hemoglobin is a protein that helps red blood cells carry oxygen throughout your body  Common signs and symptoms of acute posthemorrhagic anemia:  You may have any of the following, depending on how much blood you lost:  · Feeling weak, tired, dizzy, or lightheaded    · A fast or irregular heartbeat    · Pale or cold, clammy skin     · Shortness of breath, or fast, shallow breaths    · Nausea or loss of appetite    · Urinating little or not at all    · Trouble concentrating, or confusion    · Headache or seizures    · Chest pain or sweating  Call 911 or have someone call 911 for any of the following:   · You lose consciousness or cannot be woken  · You have severe chest pain  Seek care immediately if:   · You have dark or bloody bowel movements  Contact your healthcare provider if:   · Your symptoms are worse, even after treatment  · You have questions or concerns about your condition or care  Treatment:  Your healthcare provider may have you stop any medicines that are causing bleeding  Treatment depends on the amount of blood you lost, the cause of the bleeding, and your symptoms:  · A blood transfusion  may be needed if your body cannot replace the blood you have lost     · Surgery  may be needed to stop the bleeding, or to fix an injury  · Fluids  may be given through an IV to help increase your blood pressure  · Iron supplements  may be given if your iron level is too low  · Extra oxygen  may be needed if your oxygen level is too low  Manage your symptoms:   · Rest as needed  Anemia may make you more tired than usual  Rest will help you heal and can help prevent more bleeding       · Eat healthy foods rich in iron together with foods rich in vitamin C  Nuts, meat, dark leafy green vegetables, and beans are high in iron and protein  Vitamin C helps your body absorb iron  Foods rich in vitamin C include oranges and other citrus fruits  Ask your healthcare provider for a list of other foods that are high in iron or vitamin C  Ask if you need to be on a special diet  · Drink liquids as directed  Ask your healthcare provider how much liquid to drink and which liquids are best for you  For most people, good liquids are water, juice, and milk  Follow up with your healthcare provider as directed:  Write down your questions so you remember to ask them during your visits  © 2017 [employer]  Information is for End User's use only and may not be sold, redistributed or otherwise used for commercial purposes  All illustrations and images included in CareNotes® are the copyrighted property of [employer]  or [employer]  The above information is an  only  It is not intended as medical advice for individual conditions or treatments  Talk to your doctor, nurse or pharmacist before following any medical regimen to see if it is safe and effective for you  Menorrhagia   AMBULATORY CARE:   Menorrhagia  is heavy menstrual bleeding for more than 7 days or severe menstrual bleeding for less than 7 days  Your menstrual bleeding and cramping are so heavy that you have trouble doing your usual daily activities  Your monthly period may also occur more often, and you may bleed between periods  Menorrhagia is common in adolescence and around menopause  Common symptoms include the following:   · Soaking a pad or tampon every 1 to 2 hours    · Using both a pad and a tampon    · Waking up at night to change your pad or tampon    · Blood clots with your bleeding for more than 1 day    · Abdominal pain or cramps  Call 911 for any of the following:   · You have chest pain and shortness of breath       · Your heart is fluttering or beating faster than usual for you  Seek care immediately if:   · You feel dizzy when you stand  · You feel confused  · You have severe abdominal pain, nausea, and vomiting  · Your skin or the whites of your eyes turn yellow  Contact your healthcare provider if:   · You need to change your pad or tampon more than 1 time per hour, for several hours in a row  · You feel more weak and tired than usual      · You have new coldness in your hands and feet  · You have questions or concerns about your condition or care  Medicines: You may need any of the following:  · Iron supplements  may be given if your blood iron level decreases because of heavy bleeding  · NSAIDs , such as ibuprofen, treat menstrual cramps  This medicine is available with or without a doctor's order  NSAIDs can cause stomach bleeding or kidney problems in certain people  If you take blood thinner medicine, always ask your healthcare provider if NSAIDs are safe for you  Always read the medicine label and follow directions  · Hormones  help slow or stop your bleeding and make your monthly periods more regular  This medicine may be given as birth control pills or an intrauterine device (IUD)  · Take your medicine as directed  Contact your healthcare provider if you think your medicine is not helping or if you have side effects  Tell him of her if you are allergic to any medicine  Keep a list of the medicines, vitamins, and herbs you take  Include the amounts, and when and why you take them  Bring the list or the pill bottles to follow-up visits  Carry your medicine list with you in case of an emergency  Manage your symptoms:   · Keep a supply of pads or tampons with you at all times  If possible, stay close to a bathroom  · Apply heat  on your abdomen for 20 to 30 minutes every 2 hours for as many days as directed  Heat helps decrease pain and cramps    Follow up with your healthcare provider as directed: You may need regular pelvic exams with Pap smears to monitor your condition  Write down your questions so you remember to ask them during your visits  © 2017 [employer] Information is for End User's use only and may not be sold, redistributed or otherwise used for commercial purposes  All illustrations and images included in CareNotes® are the copyrighted property of [employer]  or [employer]  The above information is an  only  It is not intended as medical advice for individual conditions or treatments  Talk to your doctor, nurse or pharmacist before following any medical regimen to see if it is safe and effective for you  Sulfamethoxazole/Trimethoprim (By mouth)   Sulfamethoxazole (sul-fa-meth-OX-a-zole), Trimethoprim (trye-METH-oh-prim)  Treats or prevents infections  Brand Name(s): Bactrim, Bactrim DS, SMZ-TMP Pediatric, Sulfatrim, Sulfatrim Pediatric   There may be other brand names for this medicine  When This Medicine Should Not Be Used: This medicine is not right for everyone  Do not use it if you had an allergic reaction to trimethoprim, sulfamethoxazole, or any sulfa drug  Do not use this medicine if you are pregnant, if you have anemia caused by low levels of folic acid, or if you have a history of drug-induced thrombocytopenia  How to Use This Medicine:   Liquid, Tablet  · Your doctor will tell you how much medicine to use  Do not use more than directed  · Measure the oral liquid medicine with a marked measuring spoon, oral syringe, or medicine cup  · Drink extra fluids so you will urinate more often and help prevent kidney problems  · Take all of the medicine in your prescription to clear up your infection, even if you feel better after the first few doses  · Missed dose: Take a dose as soon as you remember  If it is almost time for your next dose, wait until then and take a regular dose   Do not take extra medicine to make up for a missed dose  · Store the medicine in a closed container at room temperature, away from heat, moisture, and direct light  Do not freeze the oral liquid  Drugs and Foods to Avoid:   Ask your doctor or pharmacist before using any other medicine, including over-the-counter medicines, vitamins, and herbal products  · Some medicines can affect how this medicine works  Tell your doctor if you also use the following:   ¨ amantadine, cyclosporine, digoxin, indomethacin, memantine, methotrexate, phenytoin, pyrimethamine, or warfarin  ¨ an ACE inhibitor, diabetes medicine (glipizide, glyburide, metformin, pioglitazone, repaglinide, rosiglitazone), a diuretic (water pill, such as hydrochlorothiazide), or a tricyclic antidepressant  Warnings While Using This Medicine:   · It is not safe to take this medicine during pregnancy  It could harm an unborn baby  Tell your doctor right away if you become pregnant  · Tell your doctor if you are breastfeeding, or if you have kidney disease, liver disease, diabetes, malabsorption or malnutrition, folate deficiency, porphyria, thyroid problems, or a history of alcoholism  Tell your doctor if you have asthma or severe allergies, especially if you are allergic to any medicines  It is important for your doctor to know if you have HIV or AIDS, because this medicine might work differently for you  · This medicine may cause a severe allergic reaction  · This medicine may lower the number of platelets in your body, which are necessary for proper blood clotting  This may cause you to bleed or get infections more easily  Talk with your doctor if you have concerns about this  · This medicine can cause diarrhea  Call your doctor if the diarrhea becomes severe, does not stop, or is bloody  Do not take any medicine to stop diarrhea until you have talked to your doctor  Diarrhea can occur 2 months or more after you stop taking this medicine    · Tell any doctor or dentist who treats you that you are using this medicine  This medicine may affect certain medical test results  · Your doctor will do lab tests at regular visits to check on the effects of this medicine  Keep all appointments  · Keep all medicine out of the reach of children  Never share your medicine with anyone  Possible Side Effects While Using This Medicine:   Call your doctor right away if you notice any of these side effects:  · Allergic reaction: Itching or hives, swelling in your face or hands, swelling or tingling in your mouth or throat, chest tightness, trouble breathing  · Blistering, peeling, or red skin rash  · Dark urine or pale stools, nausea, vomiting, loss of appetite, stomach pain, yellow skin or eyes  · Chest pain, cough, or trouble breathing  · Confusion, weakness  · Muscle twitching  · Severe diarrhea, stomach pain, cramps, bloating  · Skin rash, purple spots on your skin, or very pale or yellow skin  · Sore throat, fever, muscle pain  · Uneven heartbeat, numbness or tingling in your hands, feet, or lips  · Unusual bleeding, bruising, or weakness  If you notice these less serious side effects, talk with your doctor:   · Mild nausea, vomiting, or loss of appetite  If you notice other side effects that you think are caused by this medicine, tell your doctor  Call your doctor for medical advice about side effects  You may report side effects to FDA at 3-425-FDA-5482  © 2017 [employer] Information is for End User's use only and may not be sold, redistributed or otherwise used for commercial purposes  The above information is an  only  It is not intended as medical advice for individual conditions or treatments  Talk to your doctor, nurse or pharmacist before following any medical regimen to see if it is safe and effective for you

## 2020-08-25 NOTE — DISCHARGE SUMMARY
Discharge- Grayson Ortez 1965, 48 y o  female MRN: [id number]    Unit/Bed#: ED 10 Encounter: [id number]    Primary Care Provider: Madhav Kaye MD   Date and time admitted to hospital: 8/24/2020  5:41 PM        Acute cystitis without hematuria  Assessment & Plan  · Minimal symptoms, UA showing small leuk esterase and positive nitrites  · Will discharge patient with bactrim course to be completed as an outpatient  · Plan to follow up urine culture as an outpatient, adjust antibiotic regimen as needed    Benign essential hypertension  Assessment & Plan  · Blood pressure adequately controlled, continue home dose amlodipine and coreg     Cataract  Assessment & Plan  · Had Lt cataract extracted 2 years ago  Currently undergoing pre-op clearance as outpt for Rt cataract extraction    · Cataract surgery has low risk for bleeding, if Hemoglobin remains stable, she should be able to tolerate cataract removal procedure  · Will order CBC as an outpatient in 1 week     * Blood loss anemia  Assessment & Plan  · Patient is still having regular heavy menses  · Currently on her period with blood loss anemia  · Status post 2 units prbc, now stable for discharge with outpatient PMD follow up and recommendation to resume ferrous sulfate      Discharging Physician / Practitioner: Charlean Primrose, MD  PCP: Madhav Kaye MD  Admission Date:   Admission Orders (From admission, onward)     Ordered        08/25/20 1055  Place in Observation  Once         08/25/20 0850  Inpatient Admission  Once         08/24/20 1908  Place in Observation (expected length of stay for this patient is less than two midnights)  Once                   Discharge Date: 08/25/20    Disposition:      Other: Home    For Discharges to [facility]:   · Not Applicable to this Patient - Not Applicable to this Patient    Reason for Admission: Blood loss anemia    Discharge Diagnoses:     Please see assessment and plan section above for further details regarding discharge diagnoses  Resolved Problems  Date Reviewed: 8/24/2020    None          Consultations During Hospital Stay:  · NA    Procedures Performed:   · Imaging     Medication Adjustments and Discharge Medications:  · Summary of Medication Adjustments made as a result of this hospitalization: See med rec  · Medication Dosing Tapers - Please refer to Discharge Medication List for details on any medication dosing tapers (if applicable to patient)  · Medications being temporarily held (include recommended restart time): See med rec  · Discharge Medication List: See after visit summary for reconciled discharge medications  Wound Care Recommendations:  When applicable, please see wound care section of After Visit Summary  Diet Recommendations at Discharge:  Diet -        Diet Orders   (From admission, onward)             Start     Ordered    08/24/20 1942  Diet Regular; Regular House  Diet effective now     Question Answer Comment   Diet Type Regular    Regular Regular House    RD to adjust diet per protocol? Yes        08/24/20 1941                Instructions for any Catheters / Lines Present at Discharge (including removal date, if applicable): NA    Significant Findings / Test Results:   · Blood loss anemia  · Uncomplicated UTI    Incidental Findings:   · See above     Test Results Pending at Discharge (will require follow up):   · NA     Outpatient Tests Requested:  · CBC as an outpatient in 1 week    Complications:  NA    Hospital Course:     Calli Hall is a 48 y o  female patient who originally presented to the hospital on 8/24/2020 due to blood loss anemia secondary to menorrhagia, now stabilized after 2 units PRBC  Patient reported she stopped taking her Iron supplement due to resolution of anemia as an outpatient   She will restart iron supplement upon discharge, hemoglobin remained stable after 2 CBC's, at this time patient is stable for discharge with recommendation to follow up with her primary care doctor and repeat CBC in 1 week on iron supplementation  Condition at Discharge: good     Discharge Day Visit / Exam:     Subjective:  Patient feels well, has no complaints   Vitals: Blood Pressure: 145/67 (08/25/20 0930)  Pulse: 72 (08/25/20 1400)  Temperature: 98 2 °F (36 8 °C) (08/25/20 0300)  Temp Source: Oral (08/25/20 0300)  Respirations: 18 (08/25/20 1400)  Weight - Scale: 69 7 kg (153 lb 10 6 oz) (08/24/20 1649)  SpO2: 100 % (08/25/20 1400)  Exam:   Physical Exam  Constitutional:       Appearance: Normal appearance  HENT:      Head: Normocephalic and atraumatic  Cardiovascular:      Rate and Rhythm: Normal rate and regular rhythm  Pulmonary:      Effort: Pulmonary effort is normal       Breath sounds: Normal breath sounds  Abdominal:      General: Abdomen is flat  Palpations: Abdomen is soft  Skin:     General: Skin is warm and dry  Neurological:      General: No focal deficit present  Mental Status: She is alert and oriented to person, place, and time  Psychiatric:         Mood and Affect: Mood normal          Behavior: Behavior normal        Goals of Care Discussions:  · Code Status at Discharge: Level 1 - Full Code  · Were there any Goals of Care Discussions during Hospitalization?: No  · Results of any General Goals of Care Discussions: na   · POLST Completed: No   · If POLST Completed, Summary of POLST Agreement Provided Here: na   · OK to Rehospitalize if Needed? No    Discharge instructions/Information to patient and family:   See after visit summary section titled Discharge Instructions for information provided to patient and family  Planned Readmission: none      Discharge Statement:  I spent 30 minutes discharging the patient  This time was spent on the day of discharge  I had direct contact with the patient on the day of discharge   Greater than 50% of the total time was spent examining patient, answering all patient questions, arranging and discussing plan of care with patient as well as directly providing post-discharge instructions  Additional time then spent on discharge activities      ** Please Note: This note has been constructed using a voice recognition system **

## 2020-08-25 NOTE — NURSING NOTE
Pt out of er with steady gait, family driving, verbalized understanding of dc papers, pt denies any questions

## 2020-08-25 NOTE — ASSESSMENT & PLAN NOTE
· Patient is still having regular heavy menses  · Currently on her period with blood loss anemia  · Status post 2 units prbc, now stable for discharge with outpatient PMD follow up and recommendation to resume ferrous sulfate

## 2020-08-25 NOTE — UTILIZATION REVIEW
Initial Clinical Review    OBS order 8/24 1908     ED Arrival Information     Expected Arrival Acuity Means of Arrival Escorted By Service Admission Type    8/24/2020 15:24 8/24/2020 15:46 Emergent Walk-In Spouse General Medicine Emergency    Arrival Complaint    anemia        Chief Complaint   Patient presents with    Abnormal Lab     Patient presents to ED with co low hemoglobin  Patient states "I went for blood work last week and had my follow up today and I was told I need to come to ED for a blood transfusion " Patient co lethargy  Assessment/Plan: 49 yo female to ED from home , pt had labs drawn on 8/17 revealed hgb 5 5   + fatigue   Started norvasc and coreg 1 week ago fro HTN   Stopped taking her Fe in March   In Ed hgb 5 6   Admitted OBS status plan to check FOBT x3, stool heme neg , transfuse 2 u PRBC , restart Fe supplement and check cbc in am   Acute cystitis w/o hematuria , ua cx pending , start rocephin   HTN cont home meds       ED Triage Vitals [08/24/20 1649]   Temperature Pulse Respirations Blood Pressure SpO2   (!) 97 4 °F (36 3 °C) 86 18 156/67 100 %      Temp Source Heart Rate Source Patient Position - Orthostatic VS BP Location FiO2 (%)   Temporal Monitor Sitting Left arm --      Pain Score       --          Wt Readings from Last 1 Encounters:   08/24/20 69 7 kg (153 lb 10 6 oz)     Additional Vital Signs:   08/25/20 0630   --   80   20   118/58   --   100 %   None (Room air)   Lying    08/25/20 0530   --   64   18   124/59   --   99 %   None (Room air)   Lying    08/25/20 0430   --   62   18   118/64   --   100 %   None (Room air)   Lying    08/25/20 0345   --   68   18   144/75   101   100 %   None (Room air)   Lying    08/25/20 0330   --   66   20   137/69   93   100 %   None (Room air)   Lying    08/25/20 0315   --   66   20   132/58   89   100 %   None (Room air)   Lying    08/25/20 0300   98 2 °F (36 8 °C)   67   18   135/63   90   100 %   None (Room air)   Lying    08/25/20 0245 --   67   18   133/60   87   100 %   None (Room air)   Lying    08/25/20 0230   --   68   20   135/61   88   100 %   None (Room air)   Lying    08/25/20 0215   --   65   18   143/67   96   100 %   None (Room air)   Lying    08/25/20 0200   --   67   20   131/60   87   100 %   None (Room air)   Lying    08/25/20 0145   98 6 °F (37 °C)   67   18   134/60   86   100 %   None (Room air)   --    08/25/20 0130   --   66   20   128/61   88   100 %   None (Room air)   Lying    08/25/20 0115   --   65   18   143/63   97   100 %   None (Room air)   Lying    08/25/20 0100   98 2 °F (36 8 °C)   64   18   154/73   105   100 %   None (Room air)   Lying    08/25/20 0045   98 2 °F (36 8 °C)   64   18   140/66   95   100 %   None (Room air)   Lying    08/25/20 0030   98 2 °F (36 8 °C)   64   18   141/63   93   100 %   None (Room air)   Lying    08/25/20 0015   98 1 °F (36 7 °C)   63   20   163/76   109   100 %   None (Room air)   Lying    08/25/20 0000   98 2 °F (36 8 °C)   66   18   166/72   --   100 %   None (Room air)   --    08/24/20 2359   98 2 °F (36 8 °C)   70   20   143/65   --   100 %   None (Room air)   Lying    08/24/20 2353   98 2 °F (36 8 °C)   --   --   --   --   --   --   --    08/24/20 2345   --   65   20   143/65   93   100 %   None (Room air)   Lying    08/24/20 2330   --   66   18   155/70   101   100 %   None (Room air)   Lying    08/24/20 2315   --   67   20   124/60   87   100 %   None (Room air)   Lying    08/24/20 2300   --   68   18   144/69   99   100 %   None (Room air)   Lying    08/24/20 2245   --   72   18   143/70   100   100 %   None (Room air)   Lying    08/24/20 2230   --   71   20   140/66   97   100 %   None (Room air)   Lying    08/24/20 2215   --   75   20   153/70   101   100 %   None (Room air)   Lying    08/24/20 2130   98 3 °F (36 8 °C)   66   18   154/67   96   95 %   None (Room air)   Lying    08/24/20 2115   98 4 °F (36 9 °C)   64   18   157/73   105   100 %   None (Room air)   Lying 08/24/20 2100   98 5 °F (36 9 °C)   62   18   149/72   103   100 %   None (Room air)   Lying    08/24/20 2045   98 3 °F (36 8 °C)   67   20   146/69   --   100 %   None (Room air)   --    08/24/20 2030   98 4 °F (36 9 °C)   67   18   147/65   --   100 %   None (Room air)   --    08/24/20 2015   98 5 °F (36 9 °C)   76   20   175/72Abnormal     --   99 %   None (Room air)   Lying    08/24/20 2004   98 5 °F (36 9 °C)   78   20   150/69   --   100 %   None (Room air)   Lying    08/24/20 1843   --   --   --   --   --   --   None (Room air         Pertinent Labs/Diagnostic Test Results:     Results from last 7 days   Lab Units 08/25/20  0420 08/24/20  1827   WBC Thousand/uL 9 80 10 55*   HEMOGLOBIN g/dL 8 2* 5 6*   HEMATOCRIT % 30 0* 23 9*   PLATELETS Thousands/uL 652* 815*     Results from last 7 days   Lab Units 08/25/20  0420 08/24/20  1827   SODIUM mmol/L 143 136   POTASSIUM mmol/L 4 6 4 1   CHLORIDE mmol/L 107 103   CO2 mmol/L 29 28   ANION GAP mmol/L 7 5   BUN mg/dL 17 17   CREATININE mg/dL 0 90 1 04   EGFR ml/min/1 73sq m 84 70   CALCIUM mg/dL 8 4 9 4   MAGNESIUM mg/dL 2 1  --      Results from last 7 days   Lab Units 08/25/20  0420 08/24/20  1827   GLUCOSE RANDOM mg/dL 114 114     Results from last 7 days   Lab Units 08/24/20  1827   PROTIME seconds 14 5   INR  1 18   PTT seconds 31     Results from last 7 days   Lab Units 08/24/20  1827   FERRITIN ng/mL 3*       Results from last 7 days   Lab Units 08/24/20  1827   TOTAL COUNTED  100     ED Treatment:   Medication Administration from 08/24/2020 1524 to 08/25/2020 0728       Date/Time Order Dose Route Action     08/25/2020 0308 sodium chloride 0 9 % bolus 500 mL 500 mL Intravenous New Bag     08/25/2020 0308 ceftriaxone (ROCEPHIN) 1 g/50 mL in dextrose IVPB 1,000 mg Intravenous New Bag     08/24/2020 2119 carvedilol (COREG) tablet 12 5 mg 12 5 mg Oral Given     08/24/2020 2119 ferrous sulfate tablet 325 mg 325 mg Oral Given     08/24/2020 2118 amLODIPine (NORVASC) tablet 10 mg 10 mg Oral Given        Past Medical History:   Diagnosis Date    Hypertension     Iron deficiency anemia      Present on Admission:   Iron deficiency anemia   Benign essential hypertension   Cataract   Acute cystitis without hematuria      Admitting Diagnosis: Low hemoglobin [D64 9]  Age/Sex: 48 y o  female  Admission Orders:  Scheduled Medications:  amLODIPine, 10 mg, Oral, Daily  Ascorbic Acid (Vitamin C), 1,000 mg, Oral, Daily  carvedilol, 12 5 mg, Oral, BID With Meals  cefTRIAXone, 1,000 mg, Intravenous, Q24H  ferrous sulfate, 325 mg, Oral, BID      Continuous IV Infusions:     PRN Meds:  acetaminophen, 650 mg, Oral, Q4H PRN  aluminum-magnesium hydroxide-simethicone, 30 mL, Oral, Q6H PRN  magnesium hydroxide, 30 mL, Oral, Daily PRN  ondansetron, 4 mg, Intravenous, Q6H PRN    Transfuse 2 u PRBC   Reg diet   Up and [address] [address] Utilization Review Department  [email or web address]  ATTENTION: Please call with any questions or concerns to [phone] and carefully listen to the prompts so that you are directed to the right person  All voicemails are confidential   [facility] all requests for admission clinical reviews, approved or denied determinations and any other requests to dedicated fax number below belonging to the campus where the patient is receiving treatment   List of dedicated fax numbers for the Facilities:  FACILITY NAME UR FAX NUMBER   ADMISSION DENIALS (Administrative/Medical Necessity) [phone]   [address]  (Maternity/NICU/Pediatrics) [phone]   [facility] [phone]   [facility] [phone]   [facility]  [place] [address] [facility] [address] [facility] [address] [facility] [phone]

## 2020-08-25 NOTE — ASSESSMENT & PLAN NOTE
· Minimal symptoms, UA showing small leuk esterase and positive nitrites  · Will discharge patient with bactrim course to be completed as an outpatient  · Plan to follow up urine culture as an outpatient, adjust antibiotic regimen as needed

## 2020-08-25 NOTE — ASSESSMENT & PLAN NOTE
· Had Lt cataract extracted 2 years ago  Currently undergoing pre-op clearance as outpt for Rt cataract extraction    · Cataract surgery has low risk for bleeding, if Hemoglobin remains stable, she should be able to tolerate cataract removal procedure  · Will order CBC as an outpatient in 1 week

## 2020-08-26 ENCOUNTER — TELEPHONE (OUTPATIENT)
Dept: INTERNAL MEDICINE CLINIC | Facility: CLINIC | Age: 55
End: 2020-08-26

## 2020-08-28 ENCOUNTER — APPOINTMENT (OUTPATIENT)
Dept: LAB | Facility: CLINIC | Age: 55
End: 2020-08-28
Payer: COMMERCIAL

## 2020-08-28 DIAGNOSIS — D50.9 IRON DEFICIENCY ANEMIA, UNSPECIFIED IRON DEFICIENCY ANEMIA TYPE: ICD-10-CM

## 2020-08-28 DIAGNOSIS — D50.0 BLOOD LOSS ANEMIA: ICD-10-CM

## 2020-08-28 LAB
ANISOCYTOSIS BLD QL SMEAR: PRESENT
BASOPHILS # BLD MANUAL: 0 THOUSAND/UL (ref 0–0.1)
BASOPHILS NFR MAR MANUAL: 0 % (ref 0–1)
DACRYOCYTES BLD QL SMEAR: PRESENT
EOSINOPHIL # BLD MANUAL: 0.39 THOUSAND/UL (ref 0–0.4)
EOSINOPHIL NFR BLD MANUAL: 6 % (ref 0–6)
ERYTHROCYTE [DISTWIDTH] IN BLOOD BY AUTOMATED COUNT: 33.6 % (ref 11.6–15.1)
HCT VFR BLD AUTO: 31.3 % (ref 34.8–46.1)
HGB BLD-MCNC: 8.6 G/DL (ref 11.5–15.4)
HYPERCHROMIA BLD QL SMEAR: PRESENT
LYMPHOCYTES # BLD AUTO: 0.58 THOUSAND/UL (ref 0.6–4.47)
LYMPHOCYTES # BLD AUTO: 9 % (ref 14–44)
MACROCYTES BLD QL AUTO: PRESENT
MCH RBC QN AUTO: 19.4 PG (ref 26.8–34.3)
MCHC RBC AUTO-ENTMCNC: 27.5 G/DL (ref 31.4–37.4)
MCV RBC AUTO: 71 FL (ref 82–98)
MICROCYTES BLD QL AUTO: PRESENT
MONOCYTES # BLD AUTO: 0.32 THOUSAND/UL (ref 0–1.22)
MONOCYTES NFR BLD: 5 % (ref 4–12)
NEUTROPHILS # BLD MANUAL: 4.62 THOUSAND/UL (ref 1.85–7.62)
NEUTS SEG NFR BLD AUTO: 72 % (ref 43–75)
NRBC BLD AUTO-RTO: 0 /100 WBCS
OVALOCYTES BLD QL SMEAR: PRESENT
PLATELET # BLD AUTO: 622 THOUSANDS/UL (ref 149–390)
PLATELET BLD QL SMEAR: ABNORMAL
POIKILOCYTOSIS BLD QL SMEAR: PRESENT
POLYCHROMASIA BLD QL SMEAR: PRESENT
RBC # BLD AUTO: 4.44 MILLION/UL (ref 3.81–5.12)
RBC MORPH BLD: PRESENT
VARIANT LYMPHS # BLD AUTO: 8 %
WBC # BLD AUTO: 6.42 THOUSAND/UL (ref 4.31–10.16)

## 2020-08-28 PROCEDURE — 87086 URINE CULTURE/COLONY COUNT: CPT | Performed by: PHYSICIAN ASSISTANT

## 2020-08-28 PROCEDURE — 36415 COLL VENOUS BLD VENIPUNCTURE: CPT

## 2020-08-28 PROCEDURE — 85027 COMPLETE CBC AUTOMATED: CPT

## 2020-08-28 PROCEDURE — 85007 BL SMEAR W/DIFF WBC COUNT: CPT

## 2020-08-29 ENCOUNTER — APPOINTMENT (OUTPATIENT)
Dept: LAB | Facility: CLINIC | Age: 55
End: 2020-08-29
Payer: COMMERCIAL

## 2020-08-29 DIAGNOSIS — D50.9 IRON DEFICIENCY ANEMIA, UNSPECIFIED IRON DEFICIENCY ANEMIA TYPE: ICD-10-CM

## 2020-08-29 LAB
BACTERIA UR CULT: NORMAL
HEMOCCULT STL QL IA: NEGATIVE

## 2020-08-29 PROCEDURE — G0328 FECAL BLOOD SCRN IMMUNOASSAY: HCPCS

## 2020-09-21 DIAGNOSIS — D50.0 BLOOD LOSS ANEMIA: ICD-10-CM

## 2020-09-22 DIAGNOSIS — D50.0 BLOOD LOSS ANEMIA: ICD-10-CM

## 2020-09-22 RX ORDER — FERROUS SULFATE 325(65) MG
1 TABLET ORAL 2 TIMES DAILY
Qty: 180 TABLET | Refills: 1 | Status: SHIPPED | OUTPATIENT
Start: 2020-09-22 | End: 2021-04-27

## 2020-09-22 RX ORDER — FERROUS SULFATE 325(65) MG
TABLET ORAL
Qty: 60 TABLET | Refills: 0 | OUTPATIENT
Start: 2020-09-22

## 2020-10-02 ENCOUNTER — TELEPHONE (OUTPATIENT)
Dept: INTERNAL MEDICINE CLINIC | Facility: CLINIC | Age: 55
End: 2020-10-02

## 2021-01-15 DIAGNOSIS — I10 BENIGN ESSENTIAL HYPERTENSION: ICD-10-CM

## 2021-01-15 RX ORDER — CARVEDILOL 12.5 MG/1
TABLET ORAL
Qty: 60 TABLET | Refills: 3 | Status: SHIPPED | OUTPATIENT
Start: 2021-01-15 | End: 2021-06-14 | Stop reason: SDUPTHER

## 2021-04-07 ENCOUNTER — APPOINTMENT (OUTPATIENT)
Dept: LAB | Facility: CLINIC | Age: 56
End: 2021-04-07
Payer: COMMERCIAL

## 2021-04-07 ENCOUNTER — TELEPHONE (OUTPATIENT)
Dept: INTERNAL MEDICINE CLINIC | Facility: CLINIC | Age: 56
End: 2021-04-07

## 2021-04-07 ENCOUNTER — OFFICE VISIT (OUTPATIENT)
Dept: INTERNAL MEDICINE CLINIC | Facility: CLINIC | Age: 56
End: 2021-04-07
Payer: COMMERCIAL

## 2021-04-07 VITALS
SYSTOLIC BLOOD PRESSURE: 222 MMHG | BODY MASS INDEX: 28.44 KG/M2 | DIASTOLIC BLOOD PRESSURE: 140 MMHG | TEMPERATURE: 98.4 F | OXYGEN SATURATION: 100 % | WEIGHT: 166.6 LBS | HEIGHT: 64 IN | HEART RATE: 78 BPM

## 2021-04-07 DIAGNOSIS — Z11.4 SCREENING FOR HIV (HUMAN IMMUNODEFICIENCY VIRUS): ICD-10-CM

## 2021-04-07 DIAGNOSIS — E66.3 OVERWEIGHT: ICD-10-CM

## 2021-04-07 DIAGNOSIS — D50.9 IRON DEFICIENCY ANEMIA, UNSPECIFIED IRON DEFICIENCY ANEMIA TYPE: ICD-10-CM

## 2021-04-07 DIAGNOSIS — I10 UNCONTROLLED HYPERTENSION: Primary | ICD-10-CM

## 2021-04-07 DIAGNOSIS — I10 ESSENTIAL HYPERTENSION: ICD-10-CM

## 2021-04-07 DIAGNOSIS — Z12.31 ENCOUNTER FOR SCREENING MAMMOGRAM FOR MALIGNANT NEOPLASM OF BREAST: ICD-10-CM

## 2021-04-07 PROBLEM — D50.0 BLOOD LOSS ANEMIA: Status: RESOLVED | Noted: 2017-11-13 | Resolved: 2021-04-07

## 2021-04-07 PROBLEM — N30.00 ACUTE CYSTITIS WITHOUT HEMATURIA: Status: RESOLVED | Noted: 2020-08-24 | Resolved: 2021-04-07

## 2021-04-07 LAB
ALBUMIN SERPL BCP-MCNC: 3.8 G/DL (ref 3.5–5)
ALP SERPL-CCNC: 69 U/L (ref 46–116)
ALT SERPL W P-5'-P-CCNC: 14 U/L (ref 12–78)
ANION GAP SERPL CALCULATED.3IONS-SCNC: 4 MMOL/L (ref 4–13)
AST SERPL W P-5'-P-CCNC: 13 U/L (ref 5–45)
BASOPHILS # BLD AUTO: 0.02 THOUSANDS/ΜL (ref 0–0.1)
BASOPHILS NFR BLD AUTO: 1 % (ref 0–1)
BILIRUB SERPL-MCNC: 0.33 MG/DL (ref 0.2–1)
BUN SERPL-MCNC: 9 MG/DL (ref 5–25)
CALCIUM SERPL-MCNC: 9.5 MG/DL (ref 8.3–10.1)
CHLORIDE SERPL-SCNC: 109 MMOL/L (ref 100–108)
CHOLEST SERPL-MCNC: 210 MG/DL (ref 50–200)
CO2 SERPL-SCNC: 28 MMOL/L (ref 21–32)
CREAT SERPL-MCNC: 0.79 MG/DL (ref 0.6–1.3)
EOSINOPHIL # BLD AUTO: 0.1 THOUSAND/ΜL (ref 0–0.61)
EOSINOPHIL NFR BLD AUTO: 3 % (ref 0–6)
ERYTHROCYTE [DISTWIDTH] IN BLOOD BY AUTOMATED COUNT: 14.7 % (ref 11.6–15.1)
GFR SERPL CREATININE-BSD FRML MDRD: 97 ML/MIN/1.73SQ M
GLUCOSE P FAST SERPL-MCNC: 89 MG/DL (ref 65–99)
HCT VFR BLD AUTO: 43 % (ref 34.8–46.1)
HDLC SERPL-MCNC: 55 MG/DL
HGB BLD-MCNC: 12.2 G/DL (ref 11.5–15.4)
IMM GRANULOCYTES # BLD AUTO: 0.01 THOUSAND/UL (ref 0–0.2)
IMM GRANULOCYTES NFR BLD AUTO: 0 % (ref 0–2)
LDLC SERPL CALC-MCNC: 134 MG/DL (ref 0–100)
LYMPHOCYTES # BLD AUTO: 0.87 THOUSANDS/ΜL (ref 0.6–4.47)
LYMPHOCYTES NFR BLD AUTO: 23 % (ref 14–44)
MCH RBC QN AUTO: 25 PG (ref 26.8–34.3)
MCHC RBC AUTO-ENTMCNC: 28.4 G/DL (ref 31.4–37.4)
MCV RBC AUTO: 88 FL (ref 82–98)
MONOCYTES # BLD AUTO: 0.35 THOUSAND/ΜL (ref 0.17–1.22)
MONOCYTES NFR BLD AUTO: 9 % (ref 4–12)
NEUTROPHILS # BLD AUTO: 2.48 THOUSANDS/ΜL (ref 1.85–7.62)
NEUTS SEG NFR BLD AUTO: 64 % (ref 43–75)
NONHDLC SERPL-MCNC: 155 MG/DL
NRBC BLD AUTO-RTO: 0 /100 WBCS
PLATELET # BLD AUTO: 217 THOUSANDS/UL (ref 149–390)
PMV BLD AUTO: 13.4 FL (ref 8.9–12.7)
POTASSIUM SERPL-SCNC: 4.6 MMOL/L (ref 3.5–5.3)
PROT SERPL-MCNC: 7.7 G/DL (ref 6.4–8.2)
RBC # BLD AUTO: 4.88 MILLION/UL (ref 3.81–5.12)
SODIUM SERPL-SCNC: 141 MMOL/L (ref 136–145)
TRIGL SERPL-MCNC: 106 MG/DL
TSH SERPL DL<=0.05 MIU/L-ACNC: 1.1 UIU/ML (ref 0.36–3.74)
WBC # BLD AUTO: 3.83 THOUSAND/UL (ref 4.31–10.16)

## 2021-04-07 PROCEDURE — 99214 OFFICE O/P EST MOD 30 MIN: CPT | Performed by: INTERNAL MEDICINE

## 2021-04-07 PROCEDURE — 80053 COMPREHEN METABOLIC PANEL: CPT

## 2021-04-07 PROCEDURE — 87389 HIV-1 AG W/HIV-1&-2 AB AG IA: CPT

## 2021-04-07 PROCEDURE — 36415 COLL VENOUS BLD VENIPUNCTURE: CPT

## 2021-04-07 PROCEDURE — 80061 LIPID PANEL: CPT

## 2021-04-07 PROCEDURE — 84443 ASSAY THYROID STIM HORMONE: CPT

## 2021-04-07 PROCEDURE — 85025 COMPLETE CBC W/AUTO DIFF WBC: CPT

## 2021-04-07 RX ORDER — AMLODIPINE BESYLATE 10 MG/1
10 TABLET ORAL DAILY
Qty: 90 TABLET | Refills: 3 | Status: SHIPPED | OUTPATIENT
Start: 2021-04-07 | End: 2022-06-14

## 2021-04-07 NOTE — PROGRESS NOTES
INTERNAL MEDICINE OFFICE VISIT  [facility] [address]  Tel: [phone]      NAME: Charla Peres  AGE: 55 y o  SEX: female  : 1965   MRN: [id number]    DATE: 2021  TIME: 12:12 PM      Assessment and Plan:  1  Uncontrolled hypertension   patient is noncompliant with her medications  She has not been taking the amlodipine for the last 2 months  She was advised to take both the medications regularly and to monitor blood pressure  She will come back in 10 days for blood pressure recheck and in 1 month to see me back in the office  She was also told to cut down on the salt intake    2  Essential hypertension   continue amlodipine and carvedilol regularly  - amLODIPine (NORVASC) 10 mg tablet; Take 1 tablet (10 mg total) by mouth daily  Dispense: 90 tablet; Refill: 3  - CBC and differential; Future  - Comprehensive metabolic panel; Future  - Lipid panel; Future  - TSH, 3rd generation; Future    3  Iron deficiency anemia, unspecified iron deficiency anemia type   continue iron supplement    4  Overweight  BMI Counseling: Body mass index is 28 6 kg/m²  The BMI is above normal  Nutrition recommendations include decreasing portion sizes, encouraging healthy choices of fruits and vegetables and moderation in carbohydrate intake  Exercise recommendations include moderate physical activity 150 minutes/week  No pharmacotherapy was ordered  5  Screening for HIV (human immunodeficiency virus)    - HIV 1/2 Antigen/Antibody (4th Generation) w Reflex [facility]; Future    6   Encounter for screening mammogram for malignant neoplasm of breast    - Mammo screening bilateral w 3d & cad; Future      - Counseling Documentation: patient was counseled regarding: diagnostic results, instructions for management, risk factor reductions, prognosis, patient and family education, risks and benefits of treatment options and importance of compliance with treatment  - Medication Side Effects: Adverse side effects of medications were reviewed with the patient/guardian today  Return for follow up visit in  1 month or earlier, if needed  Chief Complaint:  Chief Complaint   Patient presents with    Follow-up     iron         History of Present Illness:    uncontrolled hypertension -patient has only been taking the carvedilol 12 5 mg twice a day for the last 2-3 months  She had a refill in August for amlodipine and when it ran out, she stopped taking it  She is also not compliant with her diet      Iron deficiency anemia- has been eating high iron diet and taking the supplement and the last time she checked her iron level was good  Overweight-was told to lose weight   her mother  of breast cancer but she does not want to do a mammogram   I explained to her that she is at a very high risk of getting breast cancer and needs to get the mammogram regularly      Active Problem List:  Patient Active Problem List   Diagnosis    Essential hypertension    Cataract    Benign essential hypertension    Iron deficiency anemia    Overweight         Past Medical History:  Past Medical History:   Diagnosis Date    Hypertension     Iron deficiency anemia          Past Surgical History:  Past Surgical History:   Procedure Laterality Date    CATARACT EXTRACTION      left         Family History:  Family History   Problem Relation Age of Onset    Hypertension Mother     Hypertension Father          Social History:  Social History     Socioeconomic History    Marital status: /Civil Union     Spouse name: None    Number of children: None    Years of education: None    Highest education level: None   Occupational History    None   Social Needs    Financial resource strain: None    Food insecurity     Worry: None     Inability: None    Transportation needs     Medical: None     Non-medical: None   Tobacco Use    Smoking status: Never Smoker    Smokeless tobacco: Never Used    Tobacco comment: non tobacco user   Substance and Sexual Activity    Alcohol use: No    Drug use: No    Sexual activity: Yes     Partners: Male   Lifestyle    Physical activity     Days per week: None     Minutes per session: None    Stress: None   Relationships    Social connections     Talks on phone: None     Gets together: None     Attends Restoration service: None     Active member of club or organization: None     Attends meetings of clubs or organizations: None     Relationship status: None    Intimate partner violence     Fear of current or ex partner: None     Emotionally abused: None     Physically abused: None     Forced sexual activity: None   Other Topics Concern    None   Social History Narrative    None         Allergies:   Allergies   Allergen Reactions    Other      Other reaction(s): Unknown         Medications:    Current Outpatient Medications:     amLODIPine (NORVASC) 10 mg tablet, Take 1 tablet (10 mg total) by mouth daily, Disp: 90 tablet, Rfl: 3    Ascorbic Acid, Vitamin C, (VITAMIN C) 100 MG tablet, 1 tab(s), Disp: , Rfl:     carvedilol (COREG) 12 5 mg tablet, TAKE 1 TABLET BY MOUTH TWICE A DAY WITH FOOD, Disp: 60 tablet, Rfl: 3    ferrous sulfate 325 (65 Fe) mg tablet, Take 1 tablet (325 mg total) by mouth 2 (two) times a day, Disp: 180 tablet, Rfl: 1      The following portions of the patient's history were reviewed and updated as appropriate: past medical history, past surgical history, family history, social history, allergies, current medications and active problem list       Review of Systems:  Constitutional: Denies fever, chills, weight gain, weight loss, fatigue  Eyes: Denies eye redness, eye discharge, double vision, change in visual acuity  ENT: Denies hearing loss, tinnitus, sneezing, nasal congestion, nasal discharge, sore throat   Respiratory: Denies cough, expectoration, hemoptysis, shortness of breath, wheezing  Cardiovascular: Denies chest pain, palpitations, lower extremity swelling, orthopnea, PND  Gastrointestinal: Denies abdominal pain, heartburn, nausea, vomiting, hematemesis, diarrhea, bloody stools  Genito-Urinary: Denies dysuria, frequency, difficulty in micturition, nocturia, incontinence  Musculoskeletal: Denies back pain, joint pain, muscle pain  Neurologic: Denies confusion, lightheadedness, syncope, headache, focal weakness, sensory changes, seizures  Endocrine: Denies polyuria, polydipsia, temperature intolerance  Allergy and Immunology: Denies hives, insect bite sensitivity  Hematological and Lymphatic: Denies bleeding problems, swollen glands   Psychological: Denies depression, suicidal ideation, anxiety, panic, mood swings  Dermatological: Denies pruritus, rash, skin lesion changes      Vitals:  Vitals:    04/07/21 1144   BP: (!) 222/140   Pulse: 78   Temp: 98 4 °F (36 9 °C)   SpO2: 100%       Body mass index is 28 6 kg/m²  Weight (last 2 days)     Date/Time   Weight    04/07/21 1144   75 6 (166 6)                Physical Examination:  General: Patient is not in acute distress  Awake, alert, responding to commands  No weight gain or loss  Head: Normocephalic  Atraumatic  Eyes: Conjunctiva and lids with no swelling, erythema or discharge  Both pupils normal sized, round and reactive to light  Sclera nonicteric  ENT: External examination of nose and ear normal  Otoscopic examination shows translucent tympanic membranes with patent canals without erythema  Oropharynx moist with no erythema, edema, exudate or lesions  Neck: Supple  JVP not raised  Trachea midline  No masses  No thyromegaly  Lungs: No signs of increased work of breathing or respiratory distress  Bilateral bronchovascular breath sounds with no crackles or rhonchi  Chest wall: No tenderness  Cardiovascular: Normal PMI  No thrills  Regular rate and rhythm  S1 and S2 normal  No murmur, rub or gallop  Gastrointestinal: Abdomen soft, nontender   No guarding or rigidity  Liver and spleen not palpable  Bowel sounds present  Neurologic: Cranial nerves II-XII intact  Cortical functions normal  Motor system - Reflexes 2+ and symmetrical  Sensations normal  Musculoskeletal: Gait normal  No joint tenderness  Integumentary: Skin normal with no rash or lesions  Lymphatic: No palpable lymph nodes in neck, axilla or groin  Extremities: No clubbing, cyanosis, edema or varicosities  Psychological: Judgement and insight normal  Mood and affect normal      Laboratory Results:  CBC with diff:   Lab Results   Component Value Date    WBC 6 42 08/28/2020    RBC 4 44 08/28/2020    HGB 8 6 (L) 08/28/2020    HCT 31 3 (L) 08/28/2020    MCV 71 (L) 08/28/2020    MCH 19 4 (L) 08/28/2020    RDW 33 6 (H) 08/28/2020     (H) 08/28/2020       CMP:  Lab Results   Component Value Date    CREATININE 0 90 08/25/2020    BUN 17 08/25/2020    K 4 6 08/25/2020     08/25/2020    CO2 29 08/25/2020    ALKPHOS 73 08/17/2020    ALT 12 08/17/2020    AST 9 08/17/2020       Lab Results   Component Value Date    MG 2 1 08/25/2020       No results found for: TROPONINI, CKMB, CKTOTAL    Lipid Profile:   No results found for: CHOL  Lab Results   Component Value Date    HDL 48 08/17/2020    HDL 58 11/13/2017     Lab Results   Component Value Date    LDLCALC 85 08/17/2020    LDLCALC 120 (H) 11/13/2017     Lab Results   Component Value Date    TRIG 108 08/17/2020    TRIG 58 11/13/2017       Imaging Results:  No image results found         Health Maintenance:  Health Maintenance   Topic Date Due    MAMMOGRAM  Never done    HIV Screening  Never done    Annual Physical  Never done    DTaP,Tdap,and Td Vaccines (1 - Tdap) Never done    Influenza Vaccine (1) Never done    Cervical Cancer Screening  12/18/2020    Depression Screening PHQ  08/17/2021    BMI: Followup Plan  08/17/2021    Colorectal Cancer Screening  08/29/2021    BMI: Adult  04/07/2022    Pneumococcal Vaccine: Pediatrics (0 to 5 Years) and At-Risk Patients (6 to 59 Years)  Aged Out    HIB Vaccine  Aged Out    Hepatitis B Vaccine  Aged Out    IPV Vaccine  Aged Out    Hepatitis A Vaccine  Aged Out    Meningococcal ACWY Vaccine  Aged Out    HPV Vaccine  Aged Out       There is no immunization history on file for this patient        Ashwin Hay MD  4/7/2021,12:12 PM

## 2021-04-07 NOTE — TELEPHONE ENCOUNTER
----- Message from Hu Akers MD sent at 4/7/2021  4:14 PM EDT -----  Cholesterol is high, please cut down on the fat intake    Rest of the labs are okay

## 2021-04-08 LAB — HIV 1+2 AB+HIV1 P24 AG SERPL QL IA: NORMAL

## 2021-04-27 DIAGNOSIS — D50.0 BLOOD LOSS ANEMIA: ICD-10-CM

## 2021-04-27 RX ORDER — FERROUS SULFATE 325(65) MG
TABLET ORAL
Qty: 60 TABLET | Refills: 5 | Status: SHIPPED | OUTPATIENT
Start: 2021-04-27 | End: 2022-04-29

## 2021-05-07 ENCOUNTER — OFFICE VISIT (OUTPATIENT)
Dept: INTERNAL MEDICINE CLINIC | Facility: CLINIC | Age: 56
End: 2021-05-07
Payer: COMMERCIAL

## 2021-05-07 VITALS
SYSTOLIC BLOOD PRESSURE: 142 MMHG | HEART RATE: 87 BPM | BODY MASS INDEX: 28.08 KG/M2 | OXYGEN SATURATION: 100 % | DIASTOLIC BLOOD PRESSURE: 98 MMHG | WEIGHT: 163.6 LBS | TEMPERATURE: 97.9 F

## 2021-05-07 DIAGNOSIS — I10 ESSENTIAL HYPERTENSION: Primary | ICD-10-CM

## 2021-05-07 PROCEDURE — 99213 OFFICE O/P EST LOW 20 MIN: CPT | Performed by: INTERNAL MEDICINE

## 2021-05-07 NOTE — PROGRESS NOTES
INTERNAL MEDICINE OFFICE VISIT  [facility] AT [address], [address] [place] [address]  Tel: [phone]      NAME: Abner Nash  AGE: 55 y o  SEX: female  : 1965   MRN: [id number]    DATE: 2021  TIME: 10:20 AM      Assessment and Plan:  1  Essential hypertension   she was advised to continue the amlodipine and carvedilol at the same dosage for now  She will continue to monitor her blood pressure at home  I saw multiple readings of the blood pressure taken at home and they were all within normal limits  Patient does have white coat hypertension       - Counseling Documentation: patient was counseled regarding: diagnostic results, instructions for management, risk factor reductions, prognosis, patient and family education, risks and benefits of treatment options and importance of compliance with treatment  - Medication Side Effects: Adverse side effects of medications were reviewed with the patient/guardian today  Return for follow up  visit in  3 months or earlier, if needed  Chief Complaint:  Chief Complaint   Patient presents with    Blood Pressure Check     clearance for catarac surgery         History of Present Illness:    her blood pressure is usually high when she comes to the office  Recently amlodipine was added and the blood pressure is much better this time  Also she has been monitoring her blood pressure at home and the numbers are absolutely within normal limits  She has a history of getting very anxious at the doctor's office and having high readings of blood pressure, she is asymptomatic  She was cleared for eye surgery        Active Problem List:  Patient Active Problem List   Diagnosis    Essential hypertension    Cataract    Benign essential hypertension    Iron deficiency anemia    Overweight         Past Medical History:  Past Medical History:   Diagnosis Date    Hypertension     Iron deficiency anemia Past Surgical History:  Past Surgical History:   Procedure Laterality Date    CATARACT EXTRACTION      left         Family History:  Family History   Problem Relation Age of Onset    Hypertension Mother     Hypertension Father          Social History:  Social History     Socioeconomic History    Marital status: /Civil Union     Spouse name: None    Number of children: None    Years of education: None    Highest education level: None   Occupational History    None   Social Needs    Financial resource strain: None    Food insecurity     Worry: None     Inability: None    Transportation needs     Medical: None     Non-medical: None   Tobacco Use    Smoking status: Never Smoker    Smokeless tobacco: Never Used    Tobacco comment: non tobacco user   Substance and Sexual Activity    Alcohol use: No    Drug use: No    Sexual activity: Yes     Partners: Male   Lifestyle    Physical activity     Days per week: None     Minutes per session: None    Stress: None   Relationships    Social connections     Talks on phone: None     Gets together: None     Attends Sabianism service: None     Active member of club or organization: None     Attends meetings of clubs or organizations: None     Relationship status: None    Intimate partner violence     Fear of current or ex partner: None     Emotionally abused: None     Physically abused: None     Forced sexual activity: None   Other Topics Concern    None   Social History Narrative    None         Allergies:   Allergies   Allergen Reactions    Other      Other reaction(s): Unknown         Medications:    Current Outpatient Medications:     amLODIPine (NORVASC) 10 mg tablet, Take 1 tablet (10 mg total) by mouth daily, Disp: 90 tablet, Rfl: 3    Ascorbic Acid, Vitamin C, (VITAMIN C) 100 MG tablet, 1 tab(s), Disp: , Rfl:     carvedilol (COREG) 12 5 mg tablet, TAKE 1 TABLET BY MOUTH TWICE A DAY WITH FOOD, Disp: 60 tablet, Rfl: 3    ferrous sulfate 325 (65 Fe) mg tablet, TAKE 1 TABLET BY MOUTH TWICE A DAY, Disp: 60 tablet, Rfl: 5      The following portions of the patient's history were reviewed and updated as appropriate: past medical history, past surgical history, family history, social history, allergies, current medications and active problem list       Review of Systems:  Constitutional: Denies fever, chills, weight gain, weight loss, fatigue  Eyes: Denies eye redness, eye discharge, double vision, change in visual acuity  ENT: Denies hearing loss, tinnitus, sneezing, nasal congestion, nasal discharge, sore throat   Respiratory: Denies cough, expectoration, hemoptysis, shortness of breath, wheezing  Cardiovascular: Denies chest pain, palpitations, lower extremity swelling, orthopnea, PND  Gastrointestinal: Denies abdominal pain, heartburn, nausea, vomiting, hematemesis, diarrhea, bloody stools  Genito-Urinary: Denies dysuria, frequency, difficulty in micturition, nocturia, incontinence  Musculoskeletal: Denies back pain, joint pain, muscle pain  Neurologic: Denies confusion, lightheadedness, syncope, headache, focal weakness, sensory changes, seizures  Endocrine: Denies polyuria, polydipsia, temperature intolerance  Allergy and Immunology: Denies hives, insect bite sensitivity  Hematological and Lymphatic: Denies bleeding problems, swollen glands   Psychological: Denies depression, suicidal ideation, anxiety, panic, mood swings  Dermatological: Denies pruritus, rash, skin lesion changes      Vitals:  Vitals:    05/07/21 1018   BP: 142/98   Pulse:    Temp:    SpO2:        Body mass index is 28 08 kg/m²  Weight (last 2 days)     Date/Time   Weight    05/07/21 0956   74 2 (163 6)                Physical Examination:  General: Patient is not in acute distress  Awake, alert, responding to commands  No weight gain or loss  Head: Normocephalic  Atraumatic  Eyes: Conjunctiva and lids with no swelling, erythema or discharge   Both pupils normal sized, round and reactive to light  Sclera nonicteric  ENT: External examination of nose and ear normal  Otoscopic examination shows translucent tympanic membranes with patent canals without erythema  Oropharynx moist with no erythema, edema, exudate or lesions  Neck: Supple  JVP not raised  Trachea midline  No masses  No thyromegaly  Lungs: No signs of increased work of breathing or respiratory distress  Bilateral bronchovascular breath sounds with no crackles or rhonchi  Chest wall: No tenderness  Cardiovascular: Normal PMI  No thrills  Regular rate and rhythm  S1 and S2 normal  No murmur, rub or gallop  Gastrointestinal: Abdomen soft, nontender  No guarding or rigidity  Liver and spleen not palpable  Bowel sounds present  Neurologic: Cranial nerves II-XII intact   Cortical functions normal  Motor system - Reflexes 2+ and symmetrical  Sensations normal  Musculoskeletal: Gait normal  No joint tenderness  Integumentary: Skin normal with no rash or lesions  Lymphatic: No palpable lymph nodes in neck, axilla or groin  Extremities: No clubbing, cyanosis, edema or varicosities  Psychological: Judgement and insight normal  Mood and affect normal      Laboratory Results:  CBC with diff:   Lab Results   Component Value Date    WBC 3 83 (L) 04/07/2021    RBC 4 88 04/07/2021    HGB 12 2 04/07/2021    HCT 43 0 04/07/2021    MCV 88 04/07/2021    MCH 25 0 (L) 04/07/2021    RDW 14 7 04/07/2021     04/07/2021       CMP:  Lab Results   Component Value Date    CREATININE 0 79 04/07/2021    BUN 9 04/07/2021    K 4 6 04/07/2021     (H) 04/07/2021    CO2 28 04/07/2021    ALKPHOS 69 04/07/2021    ALT 14 04/07/2021    AST 13 04/07/2021       Lab Results   Component Value Date    MG 2 1 08/25/2020       No results found for: TROPONINI, CKMB, CKTOTAL    Lipid Profile:   No results found for: CHOL  Lab Results   Component Value Date    HDL 55 04/07/2021    HDL 48 08/17/2020     Lab Results   Component Value Date    LDLCALC 134 (H) 04/07/2021 [address] 85 08/17/2020     Lab Results   Component Value Date    TRIG 106 04/07/2021    TRIG 108 08/17/2020       Imaging Results:  No image results found  Health Maintenance:  Health Maintenance   Topic Date Due    MAMMOGRAM  Never done    COVID-19 Vaccine (1) Never done    Annual Physical  Never done    DTaP,Tdap,and Td Vaccines (1 - Tdap) Never done    Cervical Cancer Screening  12/18/2020    Colorectal Cancer Screening  08/29/2021    Influenza Vaccine (Season Ended) 09/01/2021    Depression Screening PHQ  04/07/2022    BMI: Followup Plan  04/07/2022    BMI: Adult  05/07/2022    HIV Screening  Completed    Pneumococcal Vaccine: Pediatrics (0 to 5 Years) and At-Risk Patients (6 to 59 Years)  Aged Out    HIB Vaccine  Aged Out    Hepatitis B Vaccine  Aged Out    IPV Vaccine  Aged Out    Hepatitis A Vaccine  Aged Out    Meningococcal ACWY Vaccine  Aged Out    HPV Vaccine  Aged Out       There is no immunization history on file for this patient        Abel Denis MD  5/7/2021,10:20 AM

## 2021-06-07 ENCOUNTER — TELEPHONE (OUTPATIENT)
Dept: INTERNAL MEDICINE CLINIC | Facility: CLINIC | Age: 56
End: 2021-06-07

## 2021-06-07 NOTE — TELEPHONE ENCOUNTER
I can change the date of my signature but the  physical date will remain the same    Please given me the form

## 2021-06-07 NOTE — TELEPHONE ENCOUNTER
Patient was seen on 5/7 for clearance for surgery at [facility] for June 9, 2021  Lazarus Salina is stating to change the date to 6/7 on the clearance form because the form needs to reflect that patient was seen within 30 days of surgery  Re-fax form to Lazarus Salina with the new date

## 2021-06-14 DIAGNOSIS — I10 BENIGN ESSENTIAL HYPERTENSION: ICD-10-CM

## 2021-06-14 RX ORDER — CARVEDILOL 12.5 MG/1
12.5 TABLET ORAL 2 TIMES DAILY WITH MEALS
Qty: 60 TABLET | Refills: 4 | Status: SHIPPED | OUTPATIENT
Start: 2021-06-14 | End: 2021-12-01

## 2021-12-01 DIAGNOSIS — I10 BENIGN ESSENTIAL HYPERTENSION: ICD-10-CM

## 2021-12-01 RX ORDER — CARVEDILOL 12.5 MG/1
TABLET ORAL
Qty: 60 TABLET | Refills: 4 | Status: SHIPPED | OUTPATIENT
Start: 2021-12-01

## 2021-12-21 ENCOUNTER — VBI (OUTPATIENT)
Dept: ADMINISTRATIVE | Facility: OTHER | Age: 56
End: 2021-12-21

## 2022-03-31 ENCOUNTER — VBI (OUTPATIENT)
Dept: ADMINISTRATIVE | Facility: OTHER | Age: 57
End: 2022-03-31

## 2022-04-29 DIAGNOSIS — D50.0 BLOOD LOSS ANEMIA: ICD-10-CM

## 2022-04-29 RX ORDER — FERROUS SULFATE 325(65) MG
TABLET ORAL
Qty: 60 TABLET | Refills: 0 | Status: SHIPPED | OUTPATIENT
Start: 2022-04-29

## 2022-06-13 DIAGNOSIS — I10 ESSENTIAL HYPERTENSION: ICD-10-CM

## 2022-06-14 RX ORDER — AMLODIPINE BESYLATE 10 MG/1
TABLET ORAL
Qty: 90 TABLET | Refills: 3 | Status: SHIPPED | OUTPATIENT
Start: 2022-06-14

## 2022-07-08 ENCOUNTER — VBI (OUTPATIENT)
Dept: ADMINISTRATIVE | Facility: OTHER | Age: 57
End: 2022-07-08

## 2022-07-31 ENCOUNTER — APPOINTMENT (EMERGENCY)
Dept: CT IMAGING | Facility: HOSPITAL | Age: 57
DRG: 532 | End: 2022-07-31
Payer: COMMERCIAL

## 2022-07-31 ENCOUNTER — HOSPITAL ENCOUNTER (INPATIENT)
Facility: HOSPITAL | Age: 57
LOS: 2 days | Discharge: HOME | DRG: 532 | End: 2022-08-02
Attending: EMERGENCY MEDICINE | Admitting: STUDENT IN AN ORGANIZED HEALTH CARE EDUCATION/TRAINING PROGRAM
Payer: COMMERCIAL

## 2022-07-31 DIAGNOSIS — N93.9 VAGINAL BLEEDING: ICD-10-CM

## 2022-07-31 DIAGNOSIS — D62 ACUTE BLOOD LOSS ANEMIA: ICD-10-CM

## 2022-07-31 DIAGNOSIS — D64.9 ANEMIA: Primary | ICD-10-CM

## 2022-07-31 LAB
ABO GROUP BLD: NORMAL
ALBUMIN SERPL BCP-MCNC: 3.2 G/DL (ref 3.5–5)
ALP SERPL-CCNC: 63 U/L (ref 46–116)
ALT SERPL W P-5'-P-CCNC: 18 U/L (ref 12–78)
ANION GAP SERPL CALCULATED.3IONS-SCNC: 12 MMOL/L (ref 4–13)
AST SERPL W P-5'-P-CCNC: 19 U/L (ref 5–45)
BASOPHILS # BLD AUTO: 0.02 THOUSANDS/ΜL (ref 0–0.1)
BASOPHILS NFR BLD AUTO: 0 % (ref 0–1)
BILIRUB SERPL-MCNC: 0.38 MG/DL (ref 0.2–1)
BLD GP AB SCN SERPL QL: NEGATIVE
BUN SERPL-MCNC: 11 MG/DL (ref 5–25)
CALCIUM ALBUM COR SERPL-MCNC: 9.1 MG/DL (ref 8.3–10.1)
CALCIUM SERPL-MCNC: 8.5 MG/DL (ref 8.3–10.1)
CHLORIDE SERPL-SCNC: 103 MMOL/L (ref 96–108)
CO2 SERPL-SCNC: 20 MMOL/L (ref 21–32)
CREAT SERPL-MCNC: 0.91 MG/DL (ref 0.6–1.3)
EOSINOPHIL # BLD AUTO: 0.01 THOUSAND/ΜL (ref 0–0.61)
EOSINOPHIL NFR BLD AUTO: 0 % (ref 0–6)
ERYTHROCYTE [DISTWIDTH] IN BLOOD BY AUTOMATED COUNT: 21.1 % (ref 11.6–15.1)
GFR SERPL CREATININE-BSD FRML MDRD: 70 ML/MIN/1.73SQ M
GLUCOSE SERPL-MCNC: 130 MG/DL (ref 65–140)
HCG SERPL QL: NEGATIVE
HCT VFR BLD AUTO: 14 % (ref 34.8–46.1)
HCT VFR BLD AUTO: 15.5 % (ref 34.8–46.1)
HGB BLD-MCNC: 3.4 G/DL (ref 11.5–15.4)
HGB BLD-MCNC: 3.7 G/DL (ref 11.5–15.4)
IMM GRANULOCYTES # BLD AUTO: 0.25 THOUSAND/UL (ref 0–0.2)
IMM GRANULOCYTES NFR BLD AUTO: 2 % (ref 0–2)
LIPASE SERPL-CCNC: 74 U/L (ref 73–393)
LYMPHOCYTES # BLD AUTO: 0.62 THOUSANDS/ΜL (ref 0.6–4.47)
LYMPHOCYTES NFR BLD AUTO: 4 % (ref 14–44)
MCH RBC QN AUTO: 15.9 PG (ref 26.8–34.3)
MCHC RBC AUTO-ENTMCNC: 23.9 G/DL (ref 31.4–37.4)
MCV RBC AUTO: 67 FL (ref 82–98)
MONOCYTES # BLD AUTO: 0.72 THOUSAND/ΜL (ref 0.17–1.22)
MONOCYTES NFR BLD AUTO: 5 % (ref 4–12)
NEUTROPHILS # BLD AUTO: 12.95 THOUSANDS/ΜL (ref 1.85–7.62)
NEUTS SEG NFR BLD AUTO: 89 % (ref 43–75)
NRBC BLD AUTO-RTO: 0 /100 WBCS
PLATELET # BLD AUTO: 383 THOUSANDS/UL (ref 149–390)
POTASSIUM SERPL-SCNC: 4.1 MMOL/L (ref 3.5–5.3)
PROT SERPL-MCNC: 7.4 G/DL (ref 6.4–8.4)
RBC # BLD AUTO: 2.32 MILLION/UL (ref 3.81–5.12)
RH BLD: POSITIVE
SODIUM SERPL-SCNC: 135 MMOL/L (ref 135–147)
SPECIMEN EXPIRATION DATE: NORMAL
WBC # BLD AUTO: 14.57 THOUSAND/UL (ref 4.31–10.16)

## 2022-07-31 PROCEDURE — 86900 BLOOD TYPING SEROLOGIC ABO: CPT | Performed by: EMERGENCY MEDICINE

## 2022-07-31 PROCEDURE — 30233N0 TRANSFUSION OF AUTOLOGOUS RED BLOOD CELLS INTO PERIPHERAL VEIN, PERCUTANEOUS APPROACH: ICD-10-PCS | Performed by: INTERNAL MEDICINE

## 2022-07-31 PROCEDURE — 86923 COMPATIBILITY TEST ELECTRIC: CPT

## 2022-07-31 PROCEDURE — 86901 BLOOD TYPING SEROLOGIC RH(D): CPT | Performed by: EMERGENCY MEDICINE

## 2022-07-31 PROCEDURE — P9016 RBC LEUKOCYTES REDUCED: HCPCS

## 2022-07-31 PROCEDURE — 83690 ASSAY OF LIPASE: CPT | Performed by: EMERGENCY MEDICINE

## 2022-07-31 PROCEDURE — 80053 COMPREHEN METABOLIC PANEL: CPT | Performed by: EMERGENCY MEDICINE

## 2022-07-31 PROCEDURE — 96361 HYDRATE IV INFUSION ADD-ON: CPT

## 2022-07-31 PROCEDURE — 85025 COMPLETE CBC W/AUTO DIFF WBC: CPT | Performed by: EMERGENCY MEDICINE

## 2022-07-31 PROCEDURE — 36430 TRANSFUSION BLD/BLD COMPNT: CPT

## 2022-07-31 PROCEDURE — 36415 COLL VENOUS BLD VENIPUNCTURE: CPT | Performed by: EMERGENCY MEDICINE

## 2022-07-31 PROCEDURE — 85014 HEMATOCRIT: CPT | Performed by: EMERGENCY MEDICINE

## 2022-07-31 PROCEDURE — 74176 CT ABD & PELVIS W/O CONTRAST: CPT

## 2022-07-31 PROCEDURE — G1004 CDSM NDSC: HCPCS

## 2022-07-31 PROCEDURE — 85018 HEMOGLOBIN: CPT | Performed by: EMERGENCY MEDICINE

## 2022-07-31 PROCEDURE — 96365 THER/PROPH/DIAG IV INF INIT: CPT

## 2022-07-31 PROCEDURE — 96374 THER/PROPH/DIAG INJ IV PUSH: CPT

## 2022-07-31 PROCEDURE — 85045 AUTOMATED RETICULOCYTE COUNT: CPT | Performed by: INTERNAL MEDICINE

## 2022-07-31 PROCEDURE — 83550 IRON BINDING TEST: CPT | Performed by: PHYSICIAN ASSISTANT

## 2022-07-31 PROCEDURE — 83540 ASSAY OF IRON: CPT | Performed by: PHYSICIAN ASSISTANT

## 2022-07-31 PROCEDURE — 86850 RBC ANTIBODY SCREEN: CPT | Performed by: EMERGENCY MEDICINE

## 2022-07-31 PROCEDURE — 99285 EMERGENCY DEPT VISIT HI MDM: CPT

## 2022-07-31 PROCEDURE — 84703 CHORIONIC GONADOTROPIN ASSAY: CPT | Performed by: EMERGENCY MEDICINE

## 2022-07-31 PROCEDURE — 82728 ASSAY OF FERRITIN: CPT | Performed by: PHYSICIAN ASSISTANT

## 2022-07-31 PROCEDURE — 99285 EMERGENCY DEPT VISIT HI MDM: CPT | Performed by: EMERGENCY MEDICINE

## 2022-07-31 RX ORDER — KETOROLAC TROMETHAMINE 30 MG/ML
15 INJECTION, SOLUTION INTRAMUSCULAR; INTRAVENOUS ONCE
Status: COMPLETED | OUTPATIENT
Start: 2022-07-31 | End: 2022-07-31

## 2022-07-31 RX ORDER — MEDROXYPROGESTERONE ACETATE 10 MG/1
10 TABLET ORAL ONCE
Status: COMPLETED | OUTPATIENT
Start: 2022-07-31 | End: 2022-07-31

## 2022-07-31 RX ADMIN — MEDROXYPROGESTERONE ACETATE 10 MG: 10 TABLET ORAL at 22:35

## 2022-07-31 RX ADMIN — SODIUM CHLORIDE 1000 ML: 0.9 INJECTION, SOLUTION INTRAVENOUS at 19:33

## 2022-07-31 RX ADMIN — KETOROLAC TROMETHAMINE 15 MG: 30 INJECTION, SOLUTION INTRAMUSCULAR at 19:37

## 2022-07-31 RX ADMIN — TRANEXAMIC ACID 1000 MG: 1 INJECTION, SOLUTION INTRAVENOUS at 22:33

## 2022-08-01 ENCOUNTER — TELEPHONE (OUTPATIENT)
Dept: OBGYN CLINIC | Facility: CLINIC | Age: 57
End: 2022-08-01

## 2022-08-01 PROBLEM — D13.5 ADENOMYOMATOSIS OF GALLBLADDER: Status: ACTIVE | Noted: 2022-08-01

## 2022-08-01 PROBLEM — D62 ACUTE BLOOD LOSS ANEMIA: Status: ACTIVE | Noted: 2017-11-13

## 2022-08-01 PROBLEM — N93.9 VAGINAL BLEEDING: Status: ACTIVE | Noted: 2022-08-01

## 2022-08-01 LAB
ABO GROUP BLD BPU: NORMAL
ABO GROUP BLD BPU: NORMAL
BILIRUB UR QL STRIP: NEGATIVE
BPU ID: NORMAL
BPU ID: NORMAL
CLARITY UR: NORMAL
COLOR UR: YELLOW
CROSSMATCH: NORMAL
CROSSMATCH: NORMAL
FERRITIN SERPL-MCNC: 4 NG/ML (ref 8–388)
GLUCOSE UR STRIP-MCNC: NEGATIVE MG/DL
HCT VFR BLD AUTO: 20.6 % (ref 34.8–46.1)
HCT VFR BLD AUTO: 27.4 % (ref 34.8–46.1)
HGB BLD-MCNC: 6 G/DL (ref 11.5–15.4)
HGB BLD-MCNC: 7.8 G/DL (ref 11.5–15.4)
HGB UR QL STRIP.AUTO: NEGATIVE
IRON SATN MFR SERPL: 67 % (ref 15–50)
IRON SERPL-MCNC: 309 UG/DL (ref 50–170)
KETONES UR STRIP-MCNC: NEGATIVE MG/DL
LEUKOCYTE ESTERASE UR QL STRIP: NEGATIVE
NITRITE UR QL STRIP: NEGATIVE
PH UR STRIP.AUTO: 6 [PH]
PROT UR STRIP-MCNC: NEGATIVE MG/DL
RETICS # AUTO: ABNORMAL 10*3/UL (ref 14097–95744)
RETICS # CALC: 5.41 % (ref 0.37–1.87)
SP GR UR STRIP.AUTO: 1.02 (ref 1–1.03)
TIBC SERPL-MCNC: 460 UG/DL (ref 250–450)
UNIT DISPENSE STATUS: NORMAL
UNIT DISPENSE STATUS: NORMAL
UNIT PRODUCT CODE: NORMAL
UNIT PRODUCT CODE: NORMAL
UNIT PRODUCT VOLUME: 350 ML
UNIT PRODUCT VOLUME: 350 ML
UNIT RH: NORMAL
UNIT RH: NORMAL
UROBILINOGEN UR QL STRIP.AUTO: 0.2 E.U./DL

## 2022-08-01 PROCEDURE — 81003 URINALYSIS AUTO W/O SCOPE: CPT | Performed by: EMERGENCY MEDICINE

## 2022-08-01 PROCEDURE — P9016 RBC LEUKOCYTES REDUCED: HCPCS

## 2022-08-01 PROCEDURE — 85018 HEMOGLOBIN: CPT | Performed by: PHYSICIAN ASSISTANT

## 2022-08-01 PROCEDURE — 30233N0 TRANSFUSION OF AUTOLOGOUS RED BLOOD CELLS INTO PERIPHERAL VEIN, PERCUTANEOUS APPROACH: ICD-10-PCS | Performed by: INTERNAL MEDICINE

## 2022-08-01 PROCEDURE — 99252 IP/OBS CONSLTJ NEW/EST SF 35: CPT | Performed by: STUDENT IN AN ORGANIZED HEALTH CARE EDUCATION/TRAINING PROGRAM

## 2022-08-01 PROCEDURE — 85014 HEMATOCRIT: CPT | Performed by: PHYSICIAN ASSISTANT

## 2022-08-01 PROCEDURE — 99223 1ST HOSP IP/OBS HIGH 75: CPT | Performed by: STUDENT IN AN ORGANIZED HEALTH CARE EDUCATION/TRAINING PROGRAM

## 2022-08-01 PROCEDURE — 36415 COLL VENOUS BLD VENIPUNCTURE: CPT | Performed by: PHYSICIAN ASSISTANT

## 2022-08-01 RX ORDER — MAGNESIUM HYDROXIDE/ALUMINUM HYDROXICE/SIMETHICONE 120; 1200; 1200 MG/30ML; MG/30ML; MG/30ML
30 SUSPENSION ORAL EVERY 6 HOURS PRN
Status: DISCONTINUED | OUTPATIENT
Start: 2022-08-01 | End: 2022-08-02 | Stop reason: HOSPADM

## 2022-08-01 RX ORDER — AMLODIPINE BESYLATE 10 MG/1
10 TABLET ORAL DAILY
Status: DISCONTINUED | OUTPATIENT
Start: 2022-08-01 | End: 2022-08-02 | Stop reason: HOSPADM

## 2022-08-01 RX ORDER — ACETAMINOPHEN 325 MG/1
650 TABLET ORAL EVERY 6 HOURS PRN
Status: DISCONTINUED | OUTPATIENT
Start: 2022-08-01 | End: 2022-08-02 | Stop reason: HOSPADM

## 2022-08-01 RX ORDER — FERROUS SULFATE 325(65) MG
325 TABLET ORAL 2 TIMES DAILY
Status: DISCONTINUED | OUTPATIENT
Start: 2022-08-01 | End: 2022-08-02 | Stop reason: HOSPADM

## 2022-08-01 RX ORDER — CARVEDILOL 12.5 MG/1
12.5 TABLET ORAL 2 TIMES DAILY WITH MEALS
Status: DISCONTINUED | OUTPATIENT
Start: 2022-08-01 | End: 2022-08-02 | Stop reason: HOSPADM

## 2022-08-01 RX ORDER — MEDROXYPROGESTERONE ACETATE 10 MG/1
10 TABLET ORAL DAILY
Status: DISCONTINUED | OUTPATIENT
Start: 2022-08-01 | End: 2022-08-02 | Stop reason: HOSPADM

## 2022-08-01 RX ADMIN — FERROUS SULFATE TAB 325 MG (65 MG ELEMENTAL FE) 325 MG: 325 (65 FE) TAB at 17:52

## 2022-08-01 RX ADMIN — CARVEDILOL 12.5 MG: 12.5 TABLET, FILM COATED ORAL at 16:51

## 2022-08-01 RX ADMIN — MEDROXYPROGESTERONE ACETATE 10 MG: 10 TABLET ORAL at 08:22

## 2022-08-01 RX ADMIN — FERROUS SULFATE TAB 325 MG (65 MG ELEMENTAL FE) 325 MG: 325 (65 FE) TAB at 08:21

## 2022-08-01 RX ADMIN — AMLODIPINE BESYLATE 10 MG: 10 TABLET ORAL at 08:22

## 2022-08-01 RX ADMIN — CARVEDILOL 12.5 MG: 12.5 TABLET, FILM COATED ORAL at 08:22

## 2022-08-01 NOTE — ASSESSMENT & PLAN NOTE
· Presenting with hemoglobin of 3 4, baseline around 8-9 secondary to vaginal bleeding  · Type and screen and 2 units of packed red blood cells transfused in ED  · Continue to monitor H/H q 6 hours once transfusion complete  · Hold any VTE prophylaxis  · Check iron panel and continue iron b i d    · Blood consent form reviewed with patient and patient signed form for the floor

## 2022-08-01 NOTE — ASSESSMENT & PLAN NOTE
· Patient reports still having regular menstrual cycle lasting about 5 days every 28 days, reports this month menstrual cycle has lasted 10 days with menorrhagia  · CT abdomen pelvis revealing myomatous uterus  · Per ED provider, spoke with on-call Ob/gyn who recommended giving a dose of tranexamic acid who which was given in ED and starting Provera 10 mg daily and patient can remain here for treatment  · Ob/gyn consult appreciated  · Start Provera 10 mg p o   Daily  · See plan under acute blood loss anemia  · Discussed also with patient about outpatient follow-up with gynecology as soon as discharge for follow-up for myomatous uterus

## 2022-08-01 NOTE — CASE MANAGEMENT
Case Management Assessment & Discharge Planning Note    Patient name Tony Pool  Location ED 14/ED 14 MRN [id number]  : 1965 Date 2022       Current Admission Date: 2022  Current Admission Diagnosis:Acute blood loss anemia   Patient Active Problem List    Diagnosis Date Noted    Vaginal bleeding 2022    Adenomyomatosis of gallbladder 2022    Iron deficiency anemia 2021    Overweight 2021    White coat syndrome with diagnosis of hypertension 2017    Acute blood loss anemia 2017    Cataract 2017    Benign essential hypertension 2017      LOS (days): 1  Geometric Mean LOS (GMLOS) (days):   Days to GMLOS:     OBJECTIVE:    Risk of Unplanned Readmission Score: 7      Current admission status: Inpatient    Preferred Pharmacy:   [id number] [place], [place] - [address]  Phone: [phone] Fax: [phone]    Primary Care Provider: Vitaly Zhao MD    Primary Insurance: [employer]  Secondary Insurance:     ASSESSMENT:  Isaiah Rooney Proxies    There are no active Health Care Proxies on file  Patient Information  Admitted from[de-identified] Home  Mental Status: Alert  During Assessment patient was accompanied by: Daughter  Assessment information provided by[de-identified] Patient  Primary Caregiver: Self  Support Systems: Family members, Daughter  [place] of Residence: [place] [address] do you live in?: [place]  Home entry access options   Select all that apply : No steps to enter home  Type of Current Residence: 2 story home  Upon entering residence, is there a bedroom on the main floor (no further steps)?: Yes  Upon entering residence, is there a bathroom on the main floor (no further steps)?: Yes  In the last 12 months, was there a time when you were not able to pay the mortgage or rent on time?: No  In the last 12 months, was there a time when you did not have a steady place to sleep or slept in a shelter (including now)?: No  Homeless/housing insecurity resource given?: N/A  Living Arrangements: Lives w/ Daughter (Lives at home with daughters )  Is patient a ?: No    Activities of Daily Living Prior to Admission  Functional Status: Independent (Patient reports she fatigues easily during menstrual cycle  In those times she relies on daughters for assistance with cooking & light home tasks )  Completes ADLs independently?: Yes  Ambulates independently?: Yes  Does patient use assisted devices?: No  Does patient currently own DME?: No  Does patient have a history of Outpatient Therapy (PT/OT)?: No  Does the patient have a history of Short-Term Rehab?: No  Does patient have a history of HHC?: No  Does patient currently have Arlettie [address]?: No      Patient Information Continued  Income Source: Pension/group home (Patient receives pension but states she is not retired  She has had to slowly decrease work time due to menstrual cycle and complications )  Does patient have prescription coverage?: Yes  Within the past 12 months, you worried that your food would run out before you got the money to buy more : Never true  Within the past 12 months, the food you bought just didn't last and you didn't have money to get more : Never true  Food insecurity resource given?: N/A  Does patient receive dialysis treatments?: No  Does patient have a history of substance abuse?: No  Does patient have a history of Mental Health Diagnosis?: No      Means of Transportation  Means of Transport to Appts[de-identified] Family transport  In the past 12 months, has lack of transportation kept you from medical appointments or from getting medications?: No  In the past 12 months, has lack of transportation kept you from meetings, work, or from getting things needed for daily living?: No  Was application for public transport provided?: N/A        DISCHARGE DETAILS:    Discharge planning discussed with[de-identified] Patient & daughter at bedside    Freedom of Choice: Yes  Comments - Freedom of Choice: Patient education provided regarding freedom of choice  Patient offers no provider preference  Open to [facility] if needed upon d/c  Agreeable to blanket referral for services at that time if indicated  CM contacted family/caregiver?: Yes (Family present at bedside )  Were Treatment Team discharge recommendations reviewed with patient/caregiver?: Yes  Did patient/caregiver verbalize understanding of patient care needs?: Yes  Were patient/caregiver advised of the risks associated with not following Treatment Team discharge recommendations?: Yes    Contacts  Patient Contacts: Evaristo Flores (daughter)  Relationship to Patient[de-identified] Family  Contact Method:  In Person  Reason/Outcome: Continuity of Care, Discharge Planning    Transport at Discharge : Family

## 2022-08-01 NOTE — UTILIZATION REVIEW
Initial Clinical Review    Admission: Date/Time/Statement:   Admission Orders (From admission, onward)     Ordered        07/31/22 2319  Inpatient Admission  Once                      Orders Placed This Encounter   Procedures    Inpatient Admission     Standing Status:   Standing     Number of Occurrences:   1     Order Specific Question:   Level of Care     Answer:   Med Surg [16]     Order Specific Question:   Estimated length of stay     Answer:   More than 2 Midnights     Order Specific Question:   Certification     Answer:   I certify that inpatient services are medically necessary for this patient for a duration of greater than two midnights  See H&P and MD Progress Notes for additional information about the patient's course of treatment  ED Arrival Information     Expected   -    Arrival   7/31/2022 17:51    Acuity   Emergent            Means of arrival   Walk-In    Escorted by   Family Member    Service   Hospitalist    Admission type   Emergency            Arrival complaint   Vaginal Bleeding & Vomiting           Chief Complaint   Patient presents with    Abdominal Cramping     Pt c/o abdominal cramping/vaginal bleeding x 10 days        Initial Presentation: 65 y o  female to ED from home w/ PMHX of iron deficiency anemia, hypertension who presents with complaint of sudden onset lightheadedness, fatigue and not feeling well overall starting today  Patient reports she still has her menstrual cycle and usually it lasts 5 days every 28 days  However, she reports her  This month has lasted 10 days now and she has noticed clots and has been changing her sanitary napkin more frequently due to excessive bleeding  Found to have hgb 3 4 , baseline 8-9 sec to vaginal bleeding   Plan to T&S 2u and transfuse in ED , check Fe panel , cont Fe BID   Start provera po , OB/GYN consult        8/1 IM Note   S/p 2 PRBC hgb inc from 3 4-6 0  still with active vaginal bleeding and hemoglobin 6 0 will order another 1 unit packed red blood cells to be prepared and transfuse now  Continue monitor H/H    Date: 8/1   Day 2: Acute blood loss anemia:  Secondary to myomatous uterus causing menorrhagia  Currently hemoglobin 7 8 after 3 units PRBC transfusion  Bleeding has stopped as per patient report  Continue to trend CBC to ensure stable hemoglobin  Hold DVT prophylaxis  SCD for DVT prophylaxis  Continue supportive care  Follow-up with OBGYN consultation       ED Triage Vitals   Temperature Pulse Respirations Blood Pressure SpO2   07/31/22 1826 07/31/22 1826 07/31/22 1826 07/31/22 1826 07/31/22 1826   98 4 °F (36 9 °C) 77 20 131/61 100 %      Temp Source Heart Rate Source Patient Position - Orthostatic VS BP Location FiO2 (%)   07/31/22 1826 07/31/22 1826 07/31/22 1826 07/31/22 1826 --   Oral Monitor Sitting Left arm       Pain Score       07/31/22 1937       10 - Worst Possible Pain          Wt Readings from Last 1 Encounters:   05/07/21 74 2 kg (163 lb 9 6 oz)     Additional Vital Signs:   08/01/22 1100 98 4 °F (36 9 °C) 73 18 140/65 -- 100 % None (Room air) --   08/01/22 1000 98 7 °F (37 1 °C) 78 18 142/65 -- 99 % None (Room air) --   08/01/22 0900 98 5 °F (36 9 °C) 82 20 173/75 Abnormal  -- 100 % None (Room air) --   08/01/22 0843 98 6 °F (37 °C) 83 16 181/82 Abnormal  -- 100 % None (Room air) Lying   08/01/22 0823 98 1 °F (36 7 °C) 80 18 167/75 -- -- -- --   08/01/22 0330 -- 80 -- 148/67 96 100 % -- --   08/01/22 0300 -- 76 -- 139/63 90 100 % -- --   08/01/22 0230 98 7 °F (37 1 °C) 79 -- 154/72 104 100 % None (Room air) --   08/01/22 0130 98 6 °F (37 °C) 82 -- 170/76 109 100 % None (Room air) --   08/01/22 0030 98 6 °F (37 °C) 80 18 166/75 108 100 % None (Room air) --   08/01/22 0025 -- 79 -- 181/83 Abnormal  119 100 % -- --   08/01/22 0000 98 6 °F (37 °C) 89 18 170/74 106 100 % None (Room air) --   07/31/22 2330 98 9 °F (37 2 °C) 83 18 169/68 110 100 % None (Room air) --   07/31/22 2300 -- 85 -- 157/70 101 100 % -- -- 07/31/22 2130 98 4 °F (36 9 °C) 89 17 181/81 Abnormal  117 100 % None (Room air) Lying   07/31/22 2110 98 5 °F (36 9 °C) 87 17 195/87 Abnormal  -- 100 % None (Room air) --   07/31/22 2100 98 9 °F (37 2 °C) 94 17 187/84 Abnormal  -- 100 % None (Room air) --   07/31/22 2044 99 1 °F (37 3 °C) 87 17 187/80 Abnormal  -- 99 % -- --   07/31/22 2012 -- 74 18 130/74 -- 99 % None (Room air) L       Pertinent Labs/Diagnostic Test Results:   CT abdomen pelvis wo contrast   Final Result by Edin Herrera MD (07/31 2240)      Gallstones and mural calcifications consistent with adenomyomatosis  No pericholecystic inflammatory changes  Myomatous uterus      No renal stones  Normal appendix  Consider contrast examination in the appropriate clinical setting        Workstation performed: [id number]           Results from last 7 days   Lab Units 08/01/22  1252 08/01/22  0526 07/31/22 2012 07/31/22 1932   WBC Thousand/uL  --   --   --  14 57*   HEMOGLOBIN g/dL 7 8* 6 0* 3 4* 3 7*   HEMATOCRIT % 27 4* 20 6* 14 0* 15 5*   PLATELETS Thousands/uL  --   --   --  383   NEUTROS ABS Thousands/µL  --   --   --  12 95*     Results from last 7 days   Lab Units 07/31/22 1932   RETIC CT ABS  125,000*   RETIC CT PCT % 5 41*     Results from last 7 days   Lab Units 07/31/22 1932   SODIUM mmol/L 135   POTASSIUM mmol/L 4 1   CHLORIDE mmol/L 103   CO2 mmol/L 20*   ANION GAP mmol/L 12   BUN mg/dL 11   CREATININE mg/dL 0 91   EGFR ml/min/1 73sq m 70   CALCIUM mg/dL 8 5     Results from last 7 days   Lab Units 07/31/22 1932   AST U/L 19   ALT U/L 18   ALK PHOS U/L 63   TOTAL PROTEIN g/dL 7 4   ALBUMIN g/dL 3 2*   TOTAL BILIRUBIN mg/dL 0 38     Results from last 7 days   Lab Units 07/31/22 1932   GLUCOSE RANDOM mg/dL 130       Results from last 7 days   Lab Units 07/31/22  1932   FERRITIN ng/mL 4*     Results from last 7 days   Lab Units 07/31/22  1932   LIPASE u/L 74     Results from last 7 days   Lab Units 08/01/22  0655   CLARITY UA Slightly Cloudy   COLOR UA  Yellow   SPEC GRAV UA  1 025   PH UA  6 0   GLUCOSE UA mg/dl Negative   KETONES UA mg/dl Negative   BLOOD UA  Negative   PROTEIN UA mg/dl Negative   NITRITE UA  Negative   BILIRUBIN UA  Negative   UROBILINOGEN UA E U /dl 0 2   LEUKOCYTES UA  Negative       ED Treatment:   Medication Administration from 07/31/2022 1751 to 08/01/2022 1413       Date/Time Order Dose Route Action     07/31/2022 1933 sodium chloride 0 9 % bolus 1,000 mL 1,000 mL Intravenous New Bag     07/31/2022 1937 ketorolac (TORADOL) injection 15 mg 15 mg Intravenous Given     07/31/2022 2233 tranexamic Acid 1,000 mg in sodium chloride 0 9 % 100 mL IVPB 1,000 mg Intravenous New Bag     07/31/2022 2235 medroxyPROGESTERone (PROVERA) tablet 10 mg 10 mg Oral Given     08/01/2022 1469 amLODIPine (NORVASC) tablet 10 mg 10 mg Oral Given     08/01/2022 4773 ferrous sulfate tablet 325 mg 325 mg Oral Given     08/01/2022 0833 carvedilol (COREG) tablet 12 5 mg 12 5 mg Oral Given     08/01/2022 1580 medroxyPROGESTERone (PROVERA) tablet 10 mg 10 mg Oral Given        Past Medical History:   Diagnosis Date    Hypertension     Iron deficiency anemia      Present on Admission:   Vaginal bleeding   Acute blood loss anemia   Adenomyomatosis of gallbladder      Admitting Diagnosis: Abdominal cramping [R10 9]  Age/Sex: 65 y o  female  Admission Orders:  Scheduled Medications:  amLODIPine, 10 mg, Oral, Daily  carvedilol, 12 5 mg, Oral, BID With Meals  ferrous sulfate, 325 mg, Oral, BID  medroxyPROGESTERone, 10 mg, Oral, Daily      Continuous IV Infusions:     PRN Meds:  acetaminophen, 650 mg, Oral, Q6H PRN  aluminum-magnesium hydroxide-simethicone, 30 mL, Oral, Q6H PRN    Transfuse 3u   Act as joycelyn   Up and OOB     IP CONSULT TO OB GYN    Network Utilization Review Department  ATTENTION: Please call with any questions or concerns to [phone] and carefully listen to the prompts so that you are directed to the right person   All voicemails are confidential   Farrel Bodily all requests for admission clinical reviews, approved or denied determinations and any other requests to dedicated fax number below belonging to the campus where the patient is receiving treatment   List of dedicated fax numbers for the Facilities:  [address] DENIALS (Administrative/Medical Necessity) [phone]   1000  [facility] (Maternity/NICU/Pediatrics) [phone] [address]  [address] [address] [place] [address] [address] [facility] [place] [address] [phone]

## 2022-08-01 NOTE — INCIDENTAL FINDINGS
The following findings require follow up:  Radiographic finding      Finding:  CT abd/pelvis myomatous uterus, incidentally noted gallstones  and mural calcification consistent with adenomyomatosis  Follow up required: YES     Please notify the following clinician to assist with the follow up with your  primary care provider as well as Gastroenterology and OBGYN

## 2022-08-01 NOTE — H&P
[address] [facility]  H&P- Kurt Ours 1965, 65 y o  female MRN: [id number]  Unit/Bed#: ED 14 Encounter: [id number]  Primary Care Provider: Audra Coelho MD   Date and time admitted to hospital: 7/31/2022  7:08 PM    * Acute blood loss anemia  Assessment & Plan  · Presenting with hemoglobin of 3 4, baseline around 8-9 secondary to vaginal bleeding  · Type and screen and 2 units of packed red blood cells transfused in ED  · Continue to monitor H/H q 6 hours once transfusion complete  · Hold any VTE prophylaxis  · Check iron panel and continue iron b i d  · Blood consent form reviewed with patient and patient signed form for the floor    Vaginal bleeding  Assessment & Plan  · Patient reports still having regular menstrual cycle lasting about 5 days every 28 days, reports this month menstrual cycle has lasted 10 days with menorrhagia  · CT abdomen pelvis revealing myomatous uterus  · Per ED provider, spoke with on-call Ob/gyn who recommended giving a dose of tranexamic acid who which was given in ED and starting Provera 10 mg daily and patient can remain here for treatment  · Ob/gyn consult appreciated  · Start Provera 10 mg p o  Daily  · See plan under acute blood loss anemia  · Discussed also with patient about outpatient follow-up with gynecology as soon as discharge for follow-up for myomatous uterus    Adenomyomatosis of gallbladder  Assessment & Plan  · Noted incidentally on CT abdomen pelvis, gallstones and mural calcifications related to adenomyomatosis, no inflammatory changes  · Discussed finding with patient and can follow-up outpatient with PCP to discuss possibility of ultrasound or if any symptoms arise including nausea vomiting or abdominal pain      VTE Pharmacologic Prophylaxis: VTE Score: 1 Low Risk (Score 0-2) - Encourage Ambulation  Code Status: Level 1 - Full Code   Discussion with family: Updated  (daughter) at bedside      Anticipated Length of Stay: Patient will be admitted on an inpatient basis with an anticipated length of stay of greater than 2 midnights secondary to see above  Total Time for Visit, including Counseling / Coordination of Care: 60 minutes Greater than 50% of this total time spent on direct patient counseling and coordination of care  Chief Complaint:    Chief Complaint   Patient presents with    Abdominal Cramping     Pt c/o abdominal cramping/vaginal bleeding x 10 days        History of Present Illness:  Patricia Reis is a 65 y o  female with a PMH of iron deficiency anemia, hypertension who presents with complaint of sudden onset lightheadedness, fatigue and not feeling well overall starting today  Patient reports she still has her menstrual cycle and usually it lasts 5 days every 28 days  However, she reports her  This month has lasted 10 days now and she has noticed clots and has been changing her sanitary napkin more frequently due to excessive bleeding  She admits to right lower quadrant cramping, but denies any significant abdominal pain, chest pain, shortness of breath       Review of Systems:  Review of Systems   Constitutional: Positive for fatigue  Respiratory: Negative for shortness of breath  Cardiovascular: Negative for chest pain  Gastrointestinal: Positive for nausea  Negative for abdominal pain and vomiting  Genitourinary: Positive for menstrual problem and vaginal bleeding  Neurological: Positive for dizziness and light-headedness  Negative for headaches  Past Medical and Surgical History:   Past Medical History:   Diagnosis Date    Hypertension     Iron deficiency anemia        Past Surgical History:   Procedure Laterality Date    CATARACT EXTRACTION      left       Meds/Allergies:  Prior to Admission medications    Medication Sig Start Date End Date Taking?  Authorizing Provider   amLODIPine (NORVASC) 10 mg tablet TAKE 1 TABLET BY MOUTH EVERY DAY 6/14/22  Yes Jt Crocker MD   Ascorbic Acid, Vitamin C, (VITAMIN C) 100 MG tablet 1 tab(s) 3/23/17  Yes Historical Provider, MD   carvedilol (COREG) 12 5 mg tablet TAKE 1 TABLET BY MOUTH TWICE A DAY WITH FOOD 12/1/21  Yes Panfilo Lord MD   ferrous sulfate 325 (65 Fe) mg tablet TAKE 1 TABLET BY MOUTH TWICE A DAY 4/29/22  Yes Panfilo Lord MD     Have reviewed home medications per review of chart  Allergies: Allergies   Allergen Reactions    Other      Other reaction(s): Unknown       Social History:  Marital Status: /Civil Union     Patient Pre-hospital Living Situation: Home  Patient Pre-hospital Level of Mobility: walks  Patient Pre-hospital Diet Restrictions: n/a  Substance Use History:   Social History     Substance and Sexual Activity   Alcohol Use No     Social History     Tobacco Use   Smoking Status Never Smoker   Smokeless Tobacco Never Used   Tobacco Comment    non tobacco user     Social History     Substance and Sexual Activity   Drug Use No       Family History:  Family History   Problem Relation Age of Onset    Hypertension Mother     Hypertension Father        Physical Exam:     Vitals:   Blood Pressure: 166/75 (08/01/22 0030)  Pulse: 80 (08/01/22 0030)  Temperature: 98 6 °F (37 °C) (08/01/22 0030)  Temp Source: Oral (08/01/22 0030)  Respirations: 18 (08/01/22 0030)  SpO2: 100 % (08/01/22 0030)    Physical Exam  Vitals and nursing note reviewed  Constitutional:       General: She is not in acute distress  Appearance: Normal appearance  She is not diaphoretic  HENT:      Head: Normocephalic  Cardiovascular:      Rate and Rhythm: Normal rate and regular rhythm  Heart sounds: Normal heart sounds  Pulmonary:      Effort: Pulmonary effort is normal  No respiratory distress  Breath sounds: Normal breath sounds  Abdominal:      General: Abdomen is flat  Bowel sounds are normal       Palpations: Abdomen is soft  Tenderness: There is no abdominal tenderness  Musculoskeletal:         General: No tenderness  Right lower leg: No edema  Left lower leg: No edema  Skin:     General: Skin is warm and dry  Coloration: Skin is not pale  Neurological:      General: No focal deficit present  Mental Status: She is alert and oriented to person, place, and time  Psychiatric:         Mood and Affect: Mood normal          Behavior: Behavior normal          Thought Content: Thought content normal          Judgment: Judgment normal           Additional Data:     Lab Results:  Results from last 7 days   Lab Units 07/31/22 2012 07/31/22  1932   WBC Thousand/uL  --  14 57*   HEMOGLOBIN g/dL 3 4* 3 7*   HEMATOCRIT % 14 0* 15 5*   PLATELETS Thousands/uL  --  383   NEUTROS PCT %  --  89*   LYMPHS PCT %  --  4*   MONOS PCT %  --  5   EOS PCT %  --  0     Results from last 7 days   Lab Units 07/31/22 1932   SODIUM mmol/L 135   POTASSIUM mmol/L 4 1   CHLORIDE mmol/L 103   CO2 mmol/L 20*   BUN mg/dL 11   CREATININE mg/dL 0 91   ANION GAP mmol/L 12   CALCIUM mg/dL 8 5   ALBUMIN g/dL 3 2*   TOTAL BILIRUBIN mg/dL 0 38   ALK PHOS U/L 63   ALT U/L 18   AST U/L 19   GLUCOSE RANDOM mg/dL 130                       Imaging: Reviewed radiology reports from this admission including: abdominal/pelvic CT  CT abdomen pelvis wo contrast   Final Result by Bernadette Wasserman MD (07/31 2240)      Gallstones and mural calcifications consistent with adenomyomatosis  No pericholecystic inflammatory changes  Myomatous uterus      No renal stones  Normal appendix  Consider contrast examination in the appropriate clinical setting  Workstation performed: [id number]             EKG and Other Studies Reviewed on Admission:   · EKG: No EKG obtained  ** Please Note: This note has been constructed using a voice recognition system   **

## 2022-08-01 NOTE — ED PROVIDER NOTES
History  Chief Complaint   Patient presents with    Abdominal Cramping     Pt c/o abdominal cramping/vaginal bleeding x 10 days      66 y/o female, hx of iron deficiency anemia, presents to the ED for vaginal bleeding and abd pain  Patient states that she still gets her menstrual cycle monthly  States that she has had vaginal bleeding x 10 days  Has had intermittent right lower abd cramping  She states that she has gone through a pad every 4 hours  She reports that these symptoms are typical of her past periods but this is worse then usual  She has not tried anything for the symptoms  States that she currently does not have an OBGYN  She reports a hx of blood transfusion in the past  States that she has associated n/v and generalized weakness  No other complaints  No thinners  History provided by:  Patient  Vaginal Bleeding  Quality:  Bright red and clots  Severity:  Moderate  Onset quality:  Sudden  Duration:  10 days  Timing:  Intermittent  Progression:  Worsening  Chronicity:  New  Number of pads used:  1 every 4 hours   Context: spontaneously    Relieved by:  None tried  Worsened by:  Nothing  Ineffective treatments:  None tried  Associated symptoms: abdominal pain and nausea    Associated symptoms: no dysuria and no fever        Prior to Admission Medications   Prescriptions Last Dose Informant Patient Reported? Taking?    Ascorbic Acid, Vitamin C, (VITAMIN C) 100 MG tablet  Self Yes Yes   Si tab(s)   amLODIPine (NORVASC) 10 mg tablet   No Yes   Sig: TAKE 1 TABLET BY MOUTH EVERY DAY   carvedilol (COREG) 12 5 mg tablet   No Yes   Sig: TAKE 1 TABLET BY MOUTH TWICE A DAY WITH FOOD   ferrous sulfate 325 (65 Fe) mg tablet   No Yes   Sig: TAKE 1 TABLET BY MOUTH TWICE A DAY      Facility-Administered Medications: None       Past Medical History:   Diagnosis Date    Hypertension     Iron deficiency anemia        Past Surgical History:   Procedure Laterality Date    CATARACT EXTRACTION      left Family History   Problem Relation Age of Onset    Hypertension Mother     Hypertension Father      I have reviewed and agree with the history as documented  E-Cigarette/Vaping    E-Cigarette Use Never User      E-Cigarette/Vaping Substances    Nicotine No     THC No     CBD No     Flavoring No     Other No     Unknown No      Social History     Tobacco Use    Smoking status: Never Smoker    Smokeless tobacco: Never Used    Tobacco comment: non tobacco user   Vaping Use    Vaping Use: Never used   Substance Use Topics    Alcohol use: No    Drug use: No       Review of Systems   Constitutional: Negative for chills and fever  HENT: Negative for congestion, ear pain and sore throat  Eyes: Negative for pain and visual disturbance  Respiratory: Negative for cough, shortness of breath and wheezing  Cardiovascular: Negative for chest pain and leg swelling  Gastrointestinal: Positive for abdominal pain and nausea  Negative for diarrhea and vomiting  Genitourinary: Positive for vaginal bleeding  Negative for dysuria, frequency, hematuria and urgency  Musculoskeletal: Negative for neck pain and neck stiffness  Skin: Negative for rash and wound  Neurological: Negative for weakness, numbness and headaches  Psychiatric/Behavioral: Negative for agitation and confusion  All other systems reviewed and are negative  Physical Exam  Physical Exam  Vitals and nursing note reviewed  Constitutional:       Appearance: She is well-developed  HENT:      Head: Normocephalic and atraumatic  Eyes:      Pupils: Pupils are equal, round, and reactive to light  Cardiovascular:      Rate and Rhythm: Normal rate and regular rhythm  Pulmonary:      Effort: Pulmonary effort is normal       Breath sounds: Normal breath sounds  Abdominal:      General: Bowel sounds are normal       Palpations: Abdomen is soft  Tenderness: There is abdominal tenderness in the right lower quadrant  Musculoskeletal:         General: Normal range of motion  Cervical back: Normal range of motion and neck supple  Skin:     General: Skin is warm and dry  Neurological:      General: No focal deficit present  Mental Status: She is alert and oriented to person, place, and time        Comments: No focal deficits         Vital Signs  ED Triage Vitals   Temperature Pulse Respirations Blood Pressure SpO2   07/31/22 1826 07/31/22 1826 07/31/22 1826 07/31/22 1826 07/31/22 1826   98 4 °F (36 9 °C) 77 20 131/61 100 %      Temp Source Heart Rate Source Patient Position - Orthostatic VS BP Location FiO2 (%)   07/31/22 1826 07/31/22 1826 07/31/22 1826 07/31/22 1826 --   Oral Monitor Sitting Left arm       Pain Score       07/31/22 1937       10 - Worst Possible Pain           Vitals:    07/31/22 2110 07/31/22 2130 07/31/22 2300 07/31/22 2330   BP: (!) 195/87 (!) 181/81 157/70 169/68   Pulse: 87 89 85 83   Patient Position - Orthostatic VS:  Lying           Visual Acuity      ED Medications  Medications   sodium chloride 0 9 % bolus 1,000 mL (0 mL Intravenous Stopped 7/31/22 2107)   ketorolac (TORADOL) injection 15 mg (15 mg Intravenous Given 7/31/22 1937)   tranexamic Acid 1,000 mg in sodium chloride 0 9 % 100 mL IVPB (0 mg Intravenous Stopped 7/31/22 2339)   medroxyPROGESTERone (PROVERA) tablet 10 mg (10 mg Oral Given 7/31/22 2235)       Diagnostic Studies  Results Reviewed     Procedure Component Value Units Date/Time    Hemoglobin and hematocrit, blood [[id number]]  (Abnormal) Collected: 07/31/22 2012    Lab Status: Final result Specimen: Blood from Arm, Right Updated: 07/31/22 2032     Hemoglobin 3 4 g/dL      Hematocrit 14 0 %     Lipase [[id number]]  (Normal) Collected: 07/31/22 1932    Lab Status: Final result Specimen: Blood from Arm, Right Updated: 07/31/22 2008     Lipase 74 u/L     hCG, qualitative pregnancy [[id number]]  (Normal) Collected: 07/31/22 1932    Lab Status: Final result Specimen: Blood from Arm, Right Updated: 07/31/22 2008     Preg, Serum Negative    Comprehensive metabolic panel [[id number]]  (Abnormal) Collected: 07/31/22 1932    Lab Status: Final result Specimen: Blood from Arm, Right Updated: 07/31/22 2002     Sodium 135 mmol/L      Potassium 4 1 mmol/L      Chloride 103 mmol/L      CO2 20 mmol/L      ANION GAP 12 mmol/L      BUN 11 mg/dL      Creatinine 0 91 mg/dL      Glucose 130 mg/dL      Calcium 8 5 mg/dL      Corrected Calcium 9 1 mg/dL      AST 19 U/L      ALT 18 U/L      Alkaline Phosphatase 63 U/L      Total Protein 7 4 g/dL      Albumin 3 2 g/dL      Total Bilirubin 0 38 mg/dL      eGFR 70 ml/min/1 73sq m     Narrative:      National Kidney Disease Foundation guidelines for Chronic Kidney Disease (CKD):     Stage 1 with normal or high GFR (GFR > 90 mL/min/1 73 square meters)    Stage 2 Mild CKD (GFR = 60-89 mL/min/1 73 square meters)    Stage 3A Moderate CKD (GFR = 45-59 mL/min/1 73 square meters)    Stage 3B Moderate CKD (GFR = 30-44 mL/min/1 73 square meters)    Stage 4 Severe CKD (GFR = 15-29 mL/min/1 73 square meters)    Stage 5 End Stage CKD (GFR <15 mL/min/1 73 square meters)  Note: GFR calculation is accurate only with a steady state creatinine    CBC and differential [[id number]]  (Abnormal) Collected: 07/31/22 1932    Lab Status: Final result Specimen: Blood from Arm, Right Updated: 07/31/22 1958     WBC 14 57 Thousand/uL      RBC 2 32 Million/uL      Hemoglobin 3 7 g/dL      Hematocrit 15 5 %      MCV 67 fL      MCH 15 9 pg      MCHC 23 9 g/dL      RDW 21 1 %      Platelets 224 Thousands/uL      nRBC 0 /100 WBCs      Neutrophils Relative 89 %      Immat GRANS % 2 %      Lymphocytes Relative 4 %      Monocytes Relative 5 %      Eosinophils Relative 0 %      Basophils Relative 0 %      Neutrophils Absolute 12 95 Thousands/µL      Immature Grans Absolute 0 25 Thousand/uL      Lymphocytes Absolute 0 62 Thousands/µL      Monocytes Absolute 0 72 Thousand/µL      Eosinophils Absolute 0 01 Thousand/µL      Basophils Absolute 0 02 Thousands/µL     Narrative:      No Clots    UA w Reflex to Microscopic w Reflex to Culture [[id number]]     Lab Status: No result Specimen: Urine     POCT pregnancy, urine [[id number]]     Lab Status: No result Specimen: Urine                  CT abdomen pelvis wo contrast   Final Result by Marlon Crouch MD (07/31 2240)      Gallstones and mural calcifications consistent with adenomyomatosis  No pericholecystic inflammatory changes  Myomatous uterus      No renal stones  Normal appendix  Consider contrast examination in the appropriate clinical setting  Workstation performed: [id number]                    Procedures  Procedures         ED Course  ED Course as of 07/31/22 2349   Sun Jul 31, 2022 2032 Hemoglobin(!!): 3 4   2032 PREGNANCY, SERUM: Negative   2149 Spoke with BETSY recommends TXA and provera 10mg daily to help stop the bleeding  States that she can stay here for admission and will need to follow up asap with them as outpatient                                SBIRT 20yo+    Flowsheet Row Most Recent Value   SBIRT (25 yo +)    In order to provide better care to our patients, we are screening all of our patients for alcohol and drug use  Would it be okay to ask you these screening questions? Yes Filed at: 07/31/2022 2059   Initial Alcohol Screen: US AUDIT-C     1  How often do you have a drink containing alcohol? 0 Filed at: 07/31/2022 2059   2  How many drinks containing alcohol do you have on a typical day you are drinking? 0 Filed at: 07/31/2022 2059   3b  FEMALE Any Age, or MALE 65+: How often do you have 4 or more drinks on one occassion? 0 Filed at: 07/31/2022 2059   Audit-C Score 0 Filed at: 07/31/2022 2059   REBECA: How many times in the past year have you    Used an illegal drug or used a prescription medication for non-medical reasons?  Never Filed at: 07/31/2022 2059                    MDM  Number of Diagnoses or Management Options  Acute blood loss anemia: new and requires workup  Anemia: new and requires workup  Vaginal bleeding: new and requires workup  Diagnosis management comments: Patient with vaginal bleeding and abd pain- will get labs, type and screen, urine preg, UA, and ct abd/pel  Will give fluids and reassess for dispo  Patient reevaluated and feels improved  Patient updated on results of tests and plan of care including admission to hospital for further evaluation of presenting complaint  Patient demonstrates verbal understanding and agrees with plan  Report to Carmelina Milligan  with JUSTINE for continuation of patient care          Amount and/or Complexity of Data Reviewed  Clinical lab tests: ordered and reviewed  Tests in the radiology section of CPT®: ordered  Tests in the medicine section of CPT®: ordered and reviewed  Discussion of test results with the performing providers: yes  Decide to obtain previous medical records or to obtain history from someone other than the patient: yes  Obtain history from someone other than the patient: yes  Review and summarize past medical records: yes  Discuss the patient with other providers: yes  Independent visualization of images, tracings, or specimens: yes    Patient Progress  Patient progress: improved      Disposition  Final diagnoses:   Anemia   Acute blood loss anemia   Vaginal bleeding     Time reflects when diagnosis was documented in both MDM as applicable and the Disposition within this note     Time User Action Codes Description Comment    7/31/2022 11:20 PM Sara Rocher A Add [D64 9] Anemia     7/31/2022 11:20 PM Sara Rocher A Add [D62] Acute blood loss anemia     7/31/2022 11:21 PM Sara Rocher A Add [N93 9] Vaginal bleeding       ED Disposition     ED Disposition   Admit    Condition   Stable    Date/Time   Sun Jul 31, 2022 11:20 PM    Comment   Case was discussed with JUSTINE and the patient's admission status was agreed to be Admission Status: inpatient status to the service of Dr Maya Diamond   Follow-up Information    None         Patient's Medications   Discharge Prescriptions    No medications on file       No discharge procedures on file      PDMP Review     None          ED Provider  Electronically Signed by           Tiffanie Rivera DO  07/31/22 3303

## 2022-08-01 NOTE — ASSESSMENT & PLAN NOTE
Stable  Initially, elevated - home medications safely resumed  Continue amlodipine and carvedilol  Recommend increasing carvedilol to 25 mg bid if BP still remains elevated  Will defer plan to PCP - I see she has a hxt of [place] HTN which could play a role in her current BP situation

## 2022-08-01 NOTE — INCIDENTAL FINDINGS
The following findings require follow up:  Radiographic finding   Finding: "Gallstones and mural calcifications consistent with adenomyomatosis   No pericholecystic inflammatory changes "     Follow up required:  Outpatient with primary care physician   Follow up should be done within 1 month(s)    Please notify the following clinician to assist with the follow up:   Primary care physician

## 2022-08-01 NOTE — QUICK NOTE
Status post 2 units packed red blood cells, hemoglobin increased from 3 4-6 0  However, due to patient still with active vaginal bleeding and hemoglobin 6 0 will order another 1 unit packed red blood cells to be prepared and transfuse now    Continue monitor H/H

## 2022-08-01 NOTE — ASSESSMENT & PLAN NOTE
· Noted incidentally on CT abdomen pelvis, gallstones and mural calcifications related to adenomyomatosis, no inflammatory changes  · Discussed finding with patient and can follow-up outpatient with PCP to discuss possibility of ultrasound or if any symptoms arise including nausea vomiting or abdominal pain

## 2022-08-01 NOTE — CONSULTS
H&P Exam - Gynecology   Carmen Zavala 65 y o  female MRN: [id number]  Unit/Bed#: ED 14 Encounter: [id number]    Assessment/Plan     Assessment:  64 yo [id number] with acute blood loss anemia with h/o menorrhagia  Likely secondary to fibroids  Will need to rule out hyperplasia/carcinoma  Now stable  Plan:  Discharge with provera 10 mg daily  Continue until re-evaluated in the office  Follow up outpatient  Will need pelvic US to characterize fibroids, endometrial biopsy  Discussed would benefit from hysterectomy vs  Uterine artery embolization  Will start considering options  Encouraged to call office with increase in bleeding  History of Present Illness   HX and PE limited by:   HPI:     64 yo [id number] presents with heavy vaginal bleeding  [facility] reports she still gets periods every 28 days but they have been getting heavier  Her current period lasted 10 days with heavy clotting  She felt fatigued and lightheaded and presented to the ED  Her hemoglobin was 3 4  She has received 3 units of blood and increased to 7 8  She was started on provera 10 mg and her bleeding has stopped  CT scan shows large fibroid uterus  On chart review, ultrasound from [facility] from 2017 showed several large fibroids  Pt unaware that she has fibroids  She does feel bloating and pelvic heaviness  She denies bladder or bowel symptoms         Historical Information   Past Medical History:   Diagnosis Date    Hypertension     Iron deficiency anemia      Past Surgical History:   Procedure Laterality Date    CATARACT EXTRACTION      left     OB/GYN History:  x 4, miscarriage x 2  Family History   Problem Relation Age of Onset    Hypertension Mother     Hypertension Father      Social History   Social History     Substance and Sexual Activity   Alcohol Use Never     Social History     Substance and Sexual Activity   Drug Use Never     Social History     Tobacco Use   Smoking Status Never Smoker   Smokeless Tobacco Never Used Tobacco Comment    non tobacco user     E-Cigarette/Vaping    E-Cigarette Use Never User      E-Cigarette/Vaping Substances    Nicotine No     THC No     CBD No     Flavoring No     Other No     Unknown No        Meds/Allergies   all current active meds have been reviewed  Allergies   Allergen Reactions    Other      Other reaction(s): Unknown       Objective   Vitals: Blood pressure 140/65, pulse 73, temperature 98 4 °F (36 9 °C), temperature source Oral, resp  rate 18, SpO2 100 %  Intake/Output Summary (Last 24 hours) at 8/1/2022 1637  Last data filed at 8/1/2022 0655  Gross per 24 hour   Intake 2040 ml   Output 3 ml   Net 2037 ml       Invasive Devices: Invasive Devices  Report    Peripheral Intravenous Line  Duration           Peripheral IV 07/31/22 Left Hand <1 day    Peripheral IV 07/31/22 Right Antecubital <1 day                Physical Exam  Vitals reviewed  Pulmonary:      Effort: Pulmonary effort is normal    Abdominal:      Comments: Uterus palpated above umbilicus   Neurological:      Mental Status: She is alert and oriented to person, place, and time  Psychiatric:         Behavior: Behavior normal          Lab Results: I have personally reviewed pertinent reports  Imaging: I have personally reviewed pertinent reports  EKG, Pathology, and Other Studies: I have personally reviewed pertinent reports        Code Status: Level 1 - Full Code  Advance Directive and Living Will:      Power of :    POLST:

## 2022-08-01 NOTE — TELEPHONE ENCOUNTER
----- Message from Matilde Fields MD sent at 8/1/2022  4:47 PM EDT -----  Regarding: ED consult  I saw this pt in the ED today  She will need office follow up for endometrial biopsy and surgical discussion  If you can get her in with someone in August, please schedule her ASAP  If no openings, you can schedule her with me for September  Please also order outpatient pelvic ultrasound and give her the number to schedule

## 2022-08-02 VITALS
DIASTOLIC BLOOD PRESSURE: 80 MMHG | WEIGHT: 163.14 LBS | HEIGHT: 64 IN | TEMPERATURE: 98.5 F | OXYGEN SATURATION: 100 % | HEART RATE: 80 BPM | BODY MASS INDEX: 27.85 KG/M2 | SYSTOLIC BLOOD PRESSURE: 154 MMHG | RESPIRATION RATE: 18 BRPM

## 2022-08-02 PROBLEM — D25.9 UTERINE MYOMA: Status: ACTIVE | Noted: 2022-08-02

## 2022-08-02 LAB
ABO GROUP BLD BPU: NORMAL
ANION GAP SERPL CALCULATED.3IONS-SCNC: 9 MMOL/L (ref 4–13)
ANISOCYTOSIS BLD QL SMEAR: PRESENT
BASOPHILS # BLD MANUAL: 0 THOUSAND/UL (ref 0–0.1)
BASOPHILS NFR MAR MANUAL: 0 % (ref 0–1)
BPU ID: NORMAL
BUN SERPL-MCNC: 12 MG/DL (ref 5–25)
CALCIUM SERPL-MCNC: 8.6 MG/DL (ref 8.3–10.1)
CHLORIDE SERPL-SCNC: 105 MMOL/L (ref 96–108)
CO2 SERPL-SCNC: 23 MMOL/L (ref 21–32)
CREAT SERPL-MCNC: 0.87 MG/DL (ref 0.6–1.3)
CROSSMATCH: NORMAL
EOSINOPHIL # BLD MANUAL: 0.09 THOUSAND/UL (ref 0–0.4)
EOSINOPHIL NFR BLD MANUAL: 1 % (ref 0–6)
ERYTHROCYTE [DISTWIDTH] IN BLOOD BY AUTOMATED COUNT: 27.1 % (ref 11.6–15.1)
GFR SERPL CREATININE-BSD FRML MDRD: 74 ML/MIN/1.73SQ M
GLUCOSE SERPL-MCNC: 98 MG/DL (ref 65–140)
HCT VFR BLD AUTO: 28.4 % (ref 34.8–46.1)
HGB BLD-MCNC: 8.1 G/DL (ref 11.5–15.4)
LYMPHOCYTES # BLD AUTO: 1.6 THOUSAND/UL (ref 0.6–4.47)
LYMPHOCYTES # BLD AUTO: 17 % (ref 14–44)
MCH RBC QN AUTO: 22.7 PG (ref 26.8–34.3)
MCHC RBC AUTO-ENTMCNC: 28.5 G/DL (ref 31.4–37.4)
MCV RBC AUTO: 80 FL (ref 82–98)
MONOCYTES # BLD AUTO: 0.47 THOUSAND/UL (ref 0–1.22)
MONOCYTES NFR BLD: 5 % (ref 4–12)
NEUTROPHILS # BLD MANUAL: 7.15 THOUSAND/UL (ref 1.85–7.62)
NEUTS SEG NFR BLD AUTO: 76 % (ref 43–75)
PLATELET # BLD AUTO: 310 THOUSANDS/UL (ref 149–390)
PLATELET BLD QL SMEAR: ADEQUATE
PMV BLD AUTO: 11.1 FL (ref 8.9–12.7)
POLYCHROMASIA BLD QL SMEAR: PRESENT
POTASSIUM SERPL-SCNC: 4.3 MMOL/L (ref 3.5–5.3)
RBC # BLD AUTO: 3.57 MILLION/UL (ref 3.81–5.12)
RBC MORPH BLD: PRESENT
SODIUM SERPL-SCNC: 137 MMOL/L (ref 135–147)
UNIT DISPENSE STATUS: NORMAL
UNIT PRODUCT CODE: NORMAL
UNIT PRODUCT VOLUME: 350 ML
UNIT RH: NORMAL
VARIANT LYMPHS # BLD AUTO: 1 %
WBC # BLD AUTO: 9.41 THOUSAND/UL (ref 4.31–10.16)

## 2022-08-02 PROCEDURE — 99239 HOSP IP/OBS DSCHRG MGMT >30: CPT | Performed by: INTERNAL MEDICINE

## 2022-08-02 PROCEDURE — 85007 BL SMEAR W/DIFF WBC COUNT: CPT | Performed by: INTERNAL MEDICINE

## 2022-08-02 PROCEDURE — 85027 COMPLETE CBC AUTOMATED: CPT | Performed by: INTERNAL MEDICINE

## 2022-08-02 PROCEDURE — 80048 BASIC METABOLIC PNL TOTAL CA: CPT | Performed by: INTERNAL MEDICINE

## 2022-08-02 RX ORDER — MEDROXYPROGESTERONE ACETATE 10 MG/1
10 TABLET ORAL DAILY
Qty: 30 TABLET | Refills: 1 | Status: SHIPPED | OUTPATIENT
Start: 2022-08-03 | End: 2022-09-27

## 2022-08-02 RX ORDER — HYDRALAZINE HYDROCHLORIDE 20 MG/ML
5 INJECTION INTRAMUSCULAR; INTRAVENOUS EVERY 6 HOURS PRN
Status: DISCONTINUED | OUTPATIENT
Start: 2022-08-02 | End: 2022-08-02 | Stop reason: HOSPADM

## 2022-08-02 RX ADMIN — CARVEDILOL 12.5 MG: 12.5 TABLET, FILM COATED ORAL at 08:56

## 2022-08-02 RX ADMIN — AMLODIPINE BESYLATE 10 MG: 10 TABLET ORAL at 08:53

## 2022-08-02 RX ADMIN — FERROUS SULFATE TAB 325 MG (65 MG ELEMENTAL FE) 325 MG: 325 (65 FE) TAB at 08:53

## 2022-08-02 RX ADMIN — MEDROXYPROGESTERONE ACETATE 10 MG: 10 TABLET ORAL at 08:54

## 2022-08-02 RX ADMIN — HYDRALAZINE HYDROCHLORIDE 5 MG: 20 INJECTION INTRAMUSCULAR; INTRAVENOUS at 12:48

## 2022-08-02 NOTE — ASSESSMENT & PLAN NOTE
Seen on CT scan    Likely contributing to current abnormal uterine bleeding  Follow up with Gynecology as outpatient

## 2022-08-02 NOTE — PLAN OF CARE
Problem: PAIN - ADULT  Goal: Verbalizes/displays adequate comfort level or baseline comfort level  Description: Interventions:  - Encourage patient to monitor pain and request assistance  - Assess pain using appropriate pain scale  - Administer analgesics based on type and severity of pain and evaluate response  - Implement non-pharmacological measures as appropriate and evaluate response  - Consider cultural and social influences on pain and pain management  - Notify physician/advanced practitioner if interventions unsuccessful or patient reports new pain  Outcome: Progressing     Problem: INFECTION - ADULT  Goal: Absence or prevention of progression during hospitalization  Description: INTERVENTIONS:  - Assess and monitor for signs and symptoms of infection  - Monitor lab/diagnostic results  - Monitor all insertion sites, i e  indwelling lines, tubes, and drains  - Monitor endotracheal if appropriate and nasal secretions for changes in amount and color  - [place] appropriate cooling/warming therapies per order  - Administer medications as ordered  - Instruct and encourage patient and family to use good hand hygiene technique  - Identify and instruct in appropriate isolation precautions for identified infection/condition  Outcome: Progressing     Problem: DISCHARGE PLANNING  Goal: Discharge to home or other facility with appropriate resources  Description: INTERVENTIONS:  - Identify barriers to discharge w/patient and caregiver  - Arrange for needed discharge resources and transportation as appropriate  - Identify discharge learning needs (meds, wound care, etc )  - Arrange for interpretive services to assist at discharge as needed  - Refer to Case Management Department for coordinating discharge planning if the patient needs post-hospital services based on physician/advanced practitioner order or complex needs related to functional status, cognitive ability, or social support system  Outcome: Progressing Problem: Knowledge Deficit  Goal: Patient/family/caregiver demonstrates understanding of disease process, treatment plan, medications, and discharge instructions  Description: Complete learning assessment and assess knowledge base    Interventions:  - Provide teaching at level of understanding  - Provide teaching via preferred learning methods  Outcome: Progressing     Problem: CARDIOVASCULAR - ADULT  Goal: Maintains optimal cardiac output and hemodynamic stability  Description: INTERVENTIONS:  - Monitor I/O, vital signs and rhythm  - Monitor for S/S and trends of decreased cardiac output  - Administer and titrate ordered vasoactive medications to optimize hemodynamic stability  - Assess quality of pulses, skin color and temperature  - Assess for signs of decreased coronary artery perfusion  - Instruct patient to report change in severity of symptoms  Outcome: Progressing     Problem: GASTROINTESTINAL - ADULT  Goal: Minimal or absence of nausea and/or vomiting  Description: INTERVENTIONS:  - Administer IV fluids if ordered to ensure adequate hydration  - Maintain NPO status until nausea and vomiting are resolved  - Nasogastric tube if ordered  - Administer ordered antiemetic medications as needed  - Provide nonpharmacologic comfort measures as appropriate  - Advance diet as tolerated, if ordered  - Consider nutrition services referral to assist patient with adequate nutrition and appropriate food choices  Outcome: Progressing     Problem: METABOLIC, FLUID AND ELECTROLYTES - ADULT  Goal: Electrolytes maintained within normal limits  Description: INTERVENTIONS:  - Monitor labs and assess patient for signs and symptoms of electrolyte imbalances  - Administer electrolyte replacement as ordered  - Monitor response to electrolyte replacements, including repeat lab results as appropriate  - Instruct patient on fluid and nutrition as appropriate  Outcome: Progressing     Problem: HEMATOLOGIC - ADULT  Goal: Maintains hematologic stability  Description: INTERVENTIONS  - Assess for signs and symptoms of bleeding or hemorrhage  - Monitor labs  - Administer supportive blood products/factors as ordered and appropriate  Outcome: Progressing

## 2022-08-02 NOTE — ASSESSMENT & PLAN NOTE
· Vaginal bleeding has resolved  ·   · Patient reports still having regular menstrual cycle lasting about 5 days every 28 days, reports this month menstrual cycle has lasted 10 days with menorrhagia  · CT abdomen pelvis revealing myomatous uterus  · Per ED provider, spoke with on-call Ob/gyn who recommended giving a dose of tranexamic acid who which was given in ED and starting Provera 10 mg daily and patient can remain here for treatment  · Ob/gyn consult appreciated  · Continue Provera 10 mg p o  Daily - prescription sent to preferred pharmacy on chart  · See plan under acute blood loss anemia  · Discussed also with patient about outpatient follow-up with gynecology at discharge for follow-up for myomatous uterus and rule out other causes of her presentation    · Hemoglobin stable at 10 g/dL  · Advised to follow-up with OBGYN on discharge

## 2022-08-02 NOTE — ASSESSMENT & PLAN NOTE
· Noted incidentally on CT abdomen pelvis, gallstones and mural calcifications related to adenomyomatosis, no inflammatory changes  · Discussed finding with patient and can follow-up outpatient with PCP to discuss possibility of ultrasound or if any symptoms arise including nausea vomiting or abdominal pain    · Recommend follow-up with PCP

## 2022-08-02 NOTE — UTILIZATION REVIEW
Inpatient Admission Authorization Request   NOTIFICATION OF INPATIENT ADMISSION/INPATIENT AUTHORIZATION REQUEST   SERVICING FACILITY:   [address] [place] [address]  Tax ID: [id number]  NPI: [id number]  Place of Service: Inpatient [facility]  [address]  Place of Service Code: 24     ATTENDING PROVIDER:  Attending Name and NPI#: Dave Hester [[id number]]  Address: [address] [place] [address]  Phone: [phone]     UTILIZATION REVIEW CONTACT:  Leana Freeman Utilization   Network Utilization Review Department  Phone: [phone]  Fax [phone]  Email: [email or web address]     PHYSICIAN ADVISORY SERVICES:  FOR IY[facility]-ED[facility] REVIEW - MEDICAL NECESSITY DENIAL  Phone: [phone]  Fax: [phone]  Email: [email or web address]     TYPE OF REQUEST:  Inpatient Status     ADMISSION INFORMATION:  ADMISSION DATE/TIME: 7/31/22 11:20 PM  PATIENT DIAGNOSIS CODE/DESCRIPTION:  Acute blood loss anemia [D62]  Vaginal bleeding [N93 9]  Anemia [D64 9]  Abdominal cramping [R10 9]  DISCHARGE DATE/TIME: No discharge date for patient encounter  IMPORTANT INFORMATION:  Please contact Leana Freeman directly with any questions or concerns regarding this request  Department voicemails are confidential     Send requests for admission clinical reviews, concurrent reviews, approvals, and administrative denials due to lack of clinical to fax [phone]

## 2022-08-02 NOTE — DISCHARGE SUMMARY
[address]  Discharge- Myra Price 1965, 65 y o  female MRN: [id number]  Unit/Bed#: -Ishan Encounter: [id number]  Primary Care Provider: Kamila King MD   Date and time admitted to hospital: 7/31/2022  7:08 PM    * Acute blood loss anemia  Assessment & Plan  · Presenting with hemoglobin of 3 4, baseline around 8-9 secondary to vaginal bleeding  · Recent CBC with HgB of 8 1 - s/p 3u PRBC  · Hold any VTE prophylaxis  · Check iron panel and continue iron b i d  · Blood consent form reviewed with patient and patient signed form for the floor    Vaginal bleeding  Assessment & Plan  · Vaginal bleeding has resolved  ·   · Patient reports still having regular menstrual cycle lasting about 5 days every 28 days, reports this month menstrual cycle has lasted 10 days with menorrhagia  · CT abdomen pelvis revealing myomatous uterus  · Per ED provider, spoke with on-call Ob/gyn who recommended giving a dose of tranexamic acid who which was given in ED and starting Provera 10 mg daily and patient can remain here for treatment  · Ob/gyn consult appreciated  · Continue Provera 10 mg p o  Daily - prescription sent to preferred pharmacy on chart  · See plan under acute blood loss anemia  · Discussed also with patient about outpatient follow-up with gynecology at discharge for follow-up for myomatous uterus and rule out other causes of her presentation    · Hemoglobin stable at 10 g/dL  · Advised to follow-up with OBGYN on discharge  Uterine myoma  Assessment & Plan  Seen on CT scan    Likely contributing to current abnormal uterine bleeding  Follow up with Gynecology as outpatient    Adenomyomatosis of gallbladder  Assessment & Plan  · Noted incidentally on CT abdomen pelvis, gallstones and mural calcifications related to adenomyomatosis, no inflammatory changes  · Discussed finding with patient and can follow-up outpatient with PCP to discuss possibility of ultrasound or if any symptoms arise including nausea vomiting or abdominal pain  · Recommend follow-up with PCP    Benign essential hypertension  Assessment & Plan  Stable  Initially, elevated - home medications safely resumed  Continue amlodipine and carvedilol  Discharging Resident Physician: May Junior MD  Attending: Karley Suarez,*  PCP: Savage Sparrow MD  Admission Date: 7/31/2022  Admission Date:   Admission Orders (From admission, onward)     Ordered        07/31/22 2319  Inpatient Admission  Once                      Discharge Date: 08/02/22    Disposition:     Home    Reason for Admission:  Acute blood loss anemia setting of abnormal uterus bleed    Consultations During Hospital Stay:  · OB/Gynecology    Procedures Performed:     No orders of the defined types were placed in this encounter  Diagnosis:    Medical Problems             Resolved Problems  Date Reviewed: 8/2/2022   None                 Principal Problem:    Acute blood loss anemia  Active Problems:    Vaginal bleeding    Benign essential hypertension    Adenomyomatosis of gallbladder    Uterine myoma      Significant Findings / Test Results:     CT abdomen pelvis wo contrast   Final Result by Jose Hernandez MD (07/31 2240)      Gallstones and mural calcifications consistent with adenomyomatosis  No pericholecystic inflammatory changes  Myomatous uterus      No renal stones  Normal appendix  Consider contrast examination in the appropriate clinical setting  Workstation performed: [id number]             Incidental Findings:                 Finding:  CT abd/pelvis myomatous uterus, incidentally noted gallstones     and mural calcification consistent with adenomyomatosis                    Follow up required: YES                 Please notify the following clinician to assist with the follow up with your     primary care provider as well as Gastroenterology and OBGYN      Test Results Pending at Discharge (will require follow up): · None     Outpatient Tests Requested:  · None    Complications:  None    Hospital Course:     Asher Kimball is a 65 y o  female patient who originally presented to the hospital on 7/31/2022 due to severe anemia setting of abnormal uterine bleeding  She received 2 units PRBC with stabilization hemoglobin 8 1  She was seen by gynecology with the recommend continuation of Provera 10 mg daily an outpatient follow-up with Gynecology  To during hospitalization, there was no continued bleeding  At the time of discharge, patient does appear hemodynamically and clinically stable  Hb stabilized at 8 1 g/dL She has been advised to follow-up with her PCP, follow-up with gynecology and also follow-up with Gastroenterology given recent incidental finding on CT abdomen and pelvis  Rest of the details as per assessment and plan  Condition at Discharge: stable     Discharge Day Visit / Exam:     Subjective:  Patient is seen and examined at bedside  No acute overnight complete  She has been noted to have occasional elevation in blood pressure  Will give p r n  Hydralazine  She states that she has not had any vaginal bleeding since this hospitalization    Occasional spots noted on perineal pad  She feels otherwise ok  She has been advised to continue all her medications and [facility]  Vitals: Blood Pressure: (!) 180/84 (08/02/22 1027)  Pulse: 71 (08/02/22 1027)  Temperature: 98 5 °F (36 9 °C) (08/02/22 0809)  Temp Source: Oral (08/01/22 2258)  Respirations: 18 (08/02/22 0854)  Height: 5' 4 02" (162 6 cm) (08/01/22 1900)  Weight - Scale: 74 kg (163 lb 2 3 oz) (08/01/22 1900)  SpO2: 99 % (08/02/22 1027)  Exam:   Physical Exam  Vitals reviewed  Constitutional:       Appearance: Normal appearance  HENT:      Head: Normocephalic and atraumatic        Mouth/Throat:      Mouth: Mucous membranes are moist    Eyes:      Pupils: Pupils are equal, round, and reactive to light  Cardiovascular:      Rate and Rhythm: Normal rate and regular rhythm  Pulses: Normal pulses  Heart sounds: Normal heart sounds  Pulmonary:      Effort: Pulmonary effort is normal  No respiratory distress  Breath sounds: No stridor  Abdominal:      General: Bowel sounds are normal  There is no distension  Palpations: Abdomen is soft  There is no mass  Tenderness: There is no abdominal tenderness  Musculoskeletal:         General: No swelling or tenderness  Normal range of motion  Cervical back: Normal range of motion and neck supple  Skin:     General: Skin is warm  Neurological:      Mental Status: She is alert and oriented to person, place, and time  Psychiatric:         Mood and Affect: Mood normal          Behavior: Behavior normal          Discussion with Family:  Talked to Patient  All questions/concerns were answered  She agrees with the plan  Discharge instructions/Information to patient and family:   See after visit summary for information provided to patient and family  Provisions for Follow-Up Care:  See after visit summary for information related to follow-up care and any pertinent home health orders  Planned Readmission: no     Discharge Medications:  See after visit summary for reconciled discharge medications provided to patient and family  Discharge Statement :  I spent 25 minutes discharging the patient  This time was spent on the day of discharge  I had direct contact with the patient on the day of discharge  Additional documentation is required if more than 30 minutes were spent on discharge           ** Please Note: This note has been constructed using a voice recognition system **

## 2022-08-02 NOTE — ASSESSMENT & PLAN NOTE
· Presenting with hemoglobin of 3 4, baseline around 8-9 secondary to vaginal bleeding  · Recent CBC with HgB of 8 1 - s/p 3u PRBC  · Hold any VTE prophylaxis  · Check iron panel and continue iron b i d    · Blood consent form reviewed with patient and patient signed form for the floor

## 2022-08-03 ENCOUNTER — TRANSITIONAL CARE MANAGEMENT (OUTPATIENT)
Dept: INTERNAL MEDICINE CLINIC | Facility: CLINIC | Age: 57
End: 2022-08-03

## 2022-08-03 NOTE — TELEPHONE ENCOUNTER
Tried pt # again- " not in service"  Tried calling , Dominique Lantigua #- got vm- lm for him to ask her to call office  She needs a pre-op appt/OV f/u from ER  pr CS msg   Needs US also  If pt calls back can speak to anyone in triage

## 2022-08-26 DIAGNOSIS — I10 BENIGN ESSENTIAL HYPERTENSION: ICD-10-CM

## 2022-08-26 DIAGNOSIS — D50.0 BLOOD LOSS ANEMIA: ICD-10-CM

## 2022-08-26 RX ORDER — FERROUS SULFATE 325(65) MG
TABLET ORAL
Qty: 60 TABLET | Refills: 0 | Status: SHIPPED | OUTPATIENT
Start: 2022-08-26 | End: 2022-09-28

## 2022-08-26 RX ORDER — CARVEDILOL 12.5 MG/1
TABLET ORAL
Qty: 60 TABLET | Refills: 4 | Status: SHIPPED | OUTPATIENT
Start: 2022-08-26

## 2022-09-27 DIAGNOSIS — D62 ACUTE BLOOD LOSS ANEMIA: ICD-10-CM

## 2022-09-27 DIAGNOSIS — N93.9 VAGINAL BLEEDING: ICD-10-CM

## 2022-09-27 RX ORDER — MEDROXYPROGESTERONE ACETATE 10 MG/1
TABLET ORAL
Qty: 30 TABLET | Refills: 1 | Status: SHIPPED | OUTPATIENT
Start: 2022-09-27

## 2022-09-28 DIAGNOSIS — D50.0 BLOOD LOSS ANEMIA: ICD-10-CM

## 2022-09-28 RX ORDER — FERROUS SULFATE 325(65) MG
TABLET ORAL
Qty: 60 TABLET | Refills: 0 | Status: SHIPPED | OUTPATIENT
Start: 2022-09-28

## 2022-10-30 DIAGNOSIS — D50.0 BLOOD LOSS ANEMIA: ICD-10-CM

## 2022-10-31 RX ORDER — FERROUS SULFATE 325(65) MG
TABLET ORAL
Qty: 60 TABLET | Refills: 0 | Status: SHIPPED | OUTPATIENT
Start: 2022-10-31

## 2022-12-04 DIAGNOSIS — D50.0 BLOOD LOSS ANEMIA: ICD-10-CM

## 2022-12-05 RX ORDER — FERROUS SULFATE 325(65) MG
TABLET ORAL
Qty: 60 TABLET | Refills: 0 | Status: SHIPPED | OUTPATIENT
Start: 2022-12-05

## 2022-12-13 ENCOUNTER — VBI (OUTPATIENT)
Dept: ADMINISTRATIVE | Facility: OTHER | Age: 57
End: 2022-12-13

## 2022-12-27 DIAGNOSIS — D62 ACUTE BLOOD LOSS ANEMIA: ICD-10-CM

## 2022-12-27 DIAGNOSIS — N93.9 VAGINAL BLEEDING: ICD-10-CM

## 2022-12-27 RX ORDER — MEDROXYPROGESTERONE ACETATE 10 MG/1
10 TABLET ORAL DAILY
Qty: 30 TABLET | Refills: 1 | Status: SHIPPED | OUTPATIENT
Start: 2022-12-27

## 2023-01-31 DIAGNOSIS — Z12.31 ENCOUNTER FOR SCREENING MAMMOGRAM FOR MALIGNANT NEOPLASM OF BREAST: Primary | ICD-10-CM

## 2023-02-22 DIAGNOSIS — N93.9 VAGINAL BLEEDING: ICD-10-CM

## 2023-02-22 DIAGNOSIS — D62 ACUTE BLOOD LOSS ANEMIA: ICD-10-CM

## 2023-02-22 RX ORDER — MEDROXYPROGESTERONE ACETATE 10 MG/1
TABLET ORAL
Qty: 30 TABLET | Refills: 1 | Status: SHIPPED | OUTPATIENT
Start: 2023-02-22

## 2023-05-18 DIAGNOSIS — D62 ACUTE BLOOD LOSS ANEMIA: ICD-10-CM

## 2023-05-18 DIAGNOSIS — N93.9 VAGINAL BLEEDING: ICD-10-CM

## 2023-05-18 RX ORDER — MEDROXYPROGESTERONE ACETATE 10 MG/1
TABLET ORAL
Qty: 30 TABLET | Refills: 1 | Status: SHIPPED | OUTPATIENT
Start: 2023-05-18

## 2023-07-12 ENCOUNTER — VBI (OUTPATIENT)
Dept: ADMINISTRATIVE | Facility: OTHER | Age: 58
End: 2023-07-12

## 2023-07-13 DIAGNOSIS — N93.9 VAGINAL BLEEDING: ICD-10-CM

## 2023-07-13 DIAGNOSIS — D62 ACUTE BLOOD LOSS ANEMIA: ICD-10-CM

## 2023-07-13 RX ORDER — MEDROXYPROGESTERONE ACETATE 10 MG/1
TABLET ORAL
Qty: 30 TABLET | Refills: 1 | OUTPATIENT
Start: 2023-07-13

## 2023-07-14 NOTE — TELEPHONE ENCOUNTER
Spoke with patient that it has been over 2 years since we saw her last appt was 6/2021, told patient not refills till appt is made with our office. She said she will call back and didn't make appt at this time.

## 2023-07-18 DIAGNOSIS — I10 ESSENTIAL HYPERTENSION: ICD-10-CM

## 2023-07-18 RX ORDER — AMLODIPINE BESYLATE 10 MG/1
TABLET ORAL
Qty: 90 TABLET | Refills: 3 | OUTPATIENT
Start: 2023-07-18

## 2024-01-16 DIAGNOSIS — Z12.31 ENCOUNTER FOR SCREENING MAMMOGRAM FOR MALIGNANT NEOPLASM OF BREAST: Primary | ICD-10-CM

## 2024-05-06 ENCOUNTER — OFFICE VISIT (OUTPATIENT)
Age: 59
End: 2024-05-06
Payer: COMMERCIAL

## 2024-05-06 ENCOUNTER — HOSPITAL ENCOUNTER (INPATIENT)
Facility: HOSPITAL | Age: 59
LOS: 4 days | Discharge: HOME | DRG: 199 | End: 2024-05-10
Attending: EMERGENCY MEDICINE | Admitting: STUDENT IN AN ORGANIZED HEALTH CARE EDUCATION/TRAINING PROGRAM
Payer: COMMERCIAL

## 2024-05-06 ENCOUNTER — APPOINTMENT (INPATIENT)
Dept: NON INVASIVE DIAGNOSTICS | Facility: HOSPITAL | Age: 59
DRG: 199 | End: 2024-05-06
Payer: COMMERCIAL

## 2024-05-06 VITALS
HEART RATE: 122 BPM | SYSTOLIC BLOOD PRESSURE: 260 MMHG | DIASTOLIC BLOOD PRESSURE: 120 MMHG | TEMPERATURE: 98.4 F | RESPIRATION RATE: 18 BRPM | HEIGHT: 64 IN | OXYGEN SATURATION: 99 % | WEIGHT: 165 LBS | BODY MASS INDEX: 28.17 KG/M2

## 2024-05-06 DIAGNOSIS — I16.0 HYPERTENSIVE URGENCY: Primary | ICD-10-CM

## 2024-05-06 DIAGNOSIS — I70.1 RENAL ARTERY STENOSIS (HCC): ICD-10-CM

## 2024-05-06 DIAGNOSIS — R79.89 LOW TSH LEVEL: ICD-10-CM

## 2024-05-06 DIAGNOSIS — R79.89 ELEVATED TROPONIN I LEVEL: ICD-10-CM

## 2024-05-06 DIAGNOSIS — I10 WHITE COAT SYNDROME WITH DIAGNOSIS OF HYPERTENSION: ICD-10-CM

## 2024-05-06 DIAGNOSIS — I10 BENIGN ESSENTIAL HYPERTENSION: ICD-10-CM

## 2024-05-06 LAB
2HR DELTA HS TROPONIN: -16 NG/L
4HR DELTA HS TROPONIN: 25 NG/L
ALBUMIN SERPL BCP-MCNC: 4.5 G/DL (ref 3.5–5)
ALP SERPL-CCNC: 93 U/L (ref 34–104)
ALT SERPL W P-5'-P-CCNC: 11 U/L (ref 7–52)
ANION GAP SERPL CALCULATED.3IONS-SCNC: 10 MMOL/L (ref 4–13)
AST SERPL W P-5'-P-CCNC: 18 U/L (ref 13–39)
ATRIAL RATE: 139 BPM
ATRIAL RATE: 87 BPM
BASOPHILS # BLD AUTO: 0.02 THOUSANDS/ÂΜL (ref 0–0.1)
BASOPHILS NFR BLD AUTO: 0 % (ref 0–1)
BILIRUB SERPL-MCNC: 0.62 MG/DL (ref 0.2–1)
BUN SERPL-MCNC: 9 MG/DL (ref 5–25)
CALCIUM SERPL-MCNC: 10.1 MG/DL (ref 8.4–10.2)
CARDIAC TROPONIN I PNL SERPL HS: 104 NG/L
CARDIAC TROPONIN I PNL SERPL HS: 120 NG/L
CARDIAC TROPONIN I PNL SERPL HS: 145 NG/L
CHLORIDE SERPL-SCNC: 104 MMOL/L (ref 96–108)
CO2 SERPL-SCNC: 23 MMOL/L (ref 21–32)
CREAT SERPL-MCNC: 0.56 MG/DL (ref 0.6–1.3)
EOSINOPHIL # BLD AUTO: 0.12 THOUSAND/ÂΜL (ref 0–0.61)
EOSINOPHIL NFR BLD AUTO: 2 % (ref 0–6)
ERYTHROCYTE [DISTWIDTH] IN BLOOD BY AUTOMATED COUNT: 18.7 % (ref 11.6–15.1)
GFR SERPL CREATININE-BSD FRML MDRD: 103 ML/MIN/1.73SQ M
GLUCOSE SERPL-MCNC: 103 MG/DL (ref 65–140)
HCT VFR BLD AUTO: 41.9 % (ref 34.8–46.1)
HGB BLD-MCNC: 12.3 G/DL (ref 11.5–15.4)
IMM GRANULOCYTES # BLD AUTO: 0.03 THOUSAND/UL (ref 0–0.2)
IMM GRANULOCYTES NFR BLD AUTO: 1 % (ref 0–2)
LYMPHOCYTES # BLD AUTO: 1.13 THOUSANDS/ÂΜL (ref 0.6–4.47)
LYMPHOCYTES NFR BLD AUTO: 19 % (ref 14–44)
MCH RBC QN AUTO: 22.2 PG (ref 26.8–34.3)
MCHC RBC AUTO-ENTMCNC: 29.4 G/DL (ref 31.4–37.4)
MCV RBC AUTO: 76 FL (ref 82–98)
MONOCYTES # BLD AUTO: 0.52 THOUSAND/ÂΜL (ref 0.17–1.22)
MONOCYTES NFR BLD AUTO: 9 % (ref 4–12)
NEUTROPHILS # BLD AUTO: 4.11 THOUSANDS/ÂΜL (ref 1.85–7.62)
NEUTS SEG NFR BLD AUTO: 69 % (ref 43–75)
NRBC BLD AUTO-RTO: 0 /100 WBCS
P AXIS: 72 DEGREES
PLATELET # BLD AUTO: 140 THOUSANDS/UL (ref 149–390)
POTASSIUM SERPL-SCNC: 3.6 MMOL/L (ref 3.5–5.3)
PR INTERVAL: 104 MS
PR INTERVAL: 202 MS
PROT SERPL-MCNC: 8.9 G/DL (ref 6.4–8.4)
QRS AXIS: 62 DEGREES
QRS AXIS: 65 DEGREES
QRSD INTERVAL: 80 MS
QRSD INTERVAL: 94 MS
QT INTERVAL: 372 MS
QT INTERVAL: 374 MS
QTC INTERVAL: 450 MS
QTC INTERVAL: 566 MS
RBC # BLD AUTO: 5.55 MILLION/UL (ref 3.81–5.12)
SODIUM SERPL-SCNC: 137 MMOL/L (ref 135–147)
T WAVE AXIS: 52 DEGREES
T WAVE AXIS: 80 DEGREES
VENTRICULAR RATE: 139 BPM
VENTRICULAR RATE: 87 BPM
WBC # BLD AUTO: 5.93 THOUSAND/UL (ref 4.31–10.16)

## 2024-05-06 PROCEDURE — 93005 ELECTROCARDIOGRAM TRACING: CPT

## 2024-05-06 PROCEDURE — 96365 THER/PROPH/DIAG IV INF INIT: CPT

## 2024-05-06 PROCEDURE — 84484 ASSAY OF TROPONIN QUANT: CPT | Performed by: EMERGENCY MEDICINE

## 2024-05-06 PROCEDURE — 99285 EMERGENCY DEPT VISIT HI MDM: CPT | Performed by: EMERGENCY MEDICINE

## 2024-05-06 PROCEDURE — 93010 ELECTROCARDIOGRAM REPORT: CPT | Performed by: INTERNAL MEDICINE

## 2024-05-06 PROCEDURE — 80053 COMPREHEN METABOLIC PANEL: CPT | Performed by: EMERGENCY MEDICINE

## 2024-05-06 PROCEDURE — 99223 1ST HOSP IP/OBS HIGH 75: CPT | Performed by: STUDENT IN AN ORGANIZED HEALTH CARE EDUCATION/TRAINING PROGRAM

## 2024-05-06 PROCEDURE — 99284 EMERGENCY DEPT VISIT MOD MDM: CPT

## 2024-05-06 PROCEDURE — 96375 TX/PRO/DX INJ NEW DRUG ADDON: CPT

## 2024-05-06 PROCEDURE — 85025 COMPLETE CBC W/AUTO DIFF WBC: CPT | Performed by: EMERGENCY MEDICINE

## 2024-05-06 PROCEDURE — 36415 COLL VENOUS BLD VENIPUNCTURE: CPT | Performed by: EMERGENCY MEDICINE

## 2024-05-06 PROCEDURE — 99214 OFFICE O/P EST MOD 30 MIN: CPT

## 2024-05-06 RX ORDER — LABETALOL HYDROCHLORIDE 5 MG/ML
10 INJECTION, SOLUTION INTRAVENOUS ONCE
Status: COMPLETED | OUTPATIENT
Start: 2024-05-06 | End: 2024-05-06

## 2024-05-06 RX ORDER — LABETALOL HYDROCHLORIDE 5 MG/ML
10 INJECTION, SOLUTION INTRAVENOUS EVERY 6 HOURS PRN
Status: DISCONTINUED | OUTPATIENT
Start: 2024-05-06 | End: 2024-05-07

## 2024-05-06 RX ORDER — DOCUSATE SODIUM 100 MG/1
100 CAPSULE, LIQUID FILLED ORAL 2 TIMES DAILY
Status: DISCONTINUED | OUTPATIENT
Start: 2024-05-06 | End: 2024-05-10 | Stop reason: HOSPADM

## 2024-05-06 RX ORDER — SENNOSIDES 8.6 MG
1 TABLET ORAL DAILY
Status: DISCONTINUED | OUTPATIENT
Start: 2024-05-06 | End: 2024-05-10 | Stop reason: HOSPADM

## 2024-05-06 RX ORDER — ONDANSETRON 2 MG/ML
4 INJECTION INTRAMUSCULAR; INTRAVENOUS EVERY 6 HOURS PRN
Status: DISCONTINUED | OUTPATIENT
Start: 2024-05-06 | End: 2024-05-10 | Stop reason: HOSPADM

## 2024-05-06 RX ORDER — CALCIUM CARBONATE 500 MG/1
1000 TABLET, CHEWABLE ORAL DAILY PRN
Status: DISCONTINUED | OUTPATIENT
Start: 2024-05-06 | End: 2024-05-10 | Stop reason: HOSPADM

## 2024-05-06 RX ORDER — AMLODIPINE BESYLATE 10 MG/1
10 TABLET ORAL DAILY
Status: DISCONTINUED | OUTPATIENT
Start: 2024-05-07 | End: 2024-05-07

## 2024-05-06 RX ORDER — MAGNESIUM SULFATE 1 G/100ML
1 INJECTION INTRAVENOUS ONCE
Status: COMPLETED | OUTPATIENT
Start: 2024-05-06 | End: 2024-05-06

## 2024-05-06 RX ORDER — HEPARIN SODIUM 5000 [USP'U]/ML
5000 INJECTION, SOLUTION INTRAVENOUS; SUBCUTANEOUS EVERY 8 HOURS SCHEDULED
Status: DISCONTINUED | OUTPATIENT
Start: 2024-05-06 | End: 2024-05-10 | Stop reason: HOSPADM

## 2024-05-06 RX ORDER — LORAZEPAM 2 MG/ML
0.5 INJECTION INTRAMUSCULAR ONCE
Status: COMPLETED | OUTPATIENT
Start: 2024-05-06 | End: 2024-05-06

## 2024-05-06 RX ORDER — ACETAMINOPHEN 325 MG/1
650 TABLET ORAL EVERY 6 HOURS PRN
Status: DISCONTINUED | OUTPATIENT
Start: 2024-05-06 | End: 2024-05-10 | Stop reason: HOSPADM

## 2024-05-06 RX ADMIN — LABETALOL HYDROCHLORIDE 10 MG: 5 INJECTION, SOLUTION INTRAVENOUS at 12:59

## 2024-05-06 RX ADMIN — LORAZEPAM 0.5 MG: 2 INJECTION INTRAMUSCULAR; INTRAVENOUS at 12:06

## 2024-05-06 RX ADMIN — MAGNESIUM SULFATE HEPTAHYDRATE 1 G: 1 INJECTION, SOLUTION INTRAVENOUS at 12:08

## 2024-05-06 RX ADMIN — LABETALOL HYDROCHLORIDE 10 MG: 5 INJECTION, SOLUTION INTRAVENOUS at 18:24

## 2024-05-06 RX ADMIN — HEPARIN SODIUM 5000 UNITS: 5000 INJECTION INTRAVENOUS; SUBCUTANEOUS at 15:14

## 2024-05-06 RX ADMIN — SODIUM CHLORIDE 500 ML: 0.9 INJECTION, SOLUTION INTRAVENOUS at 12:06

## 2024-05-06 NOTE — H&P
[facility]  H&P  Name: Tayler Villalobos 59 y.o. female I MRN: [id number]  Unit/Bed#: FT 05 I Date of Admission: 5/6/2024   Date of Service: 5/6/2024 I Hospital Day: 0      Assessment/Plan   * Hypertensive urgency  Assessment & Plan  Elevated blood pressure to 257 systolic in ED  In association with elevated troponin   Did respond to IV push of labetalol   Resume home dose amlodipine in AM (took this morning)  Continue IV push labetalol  Goal blood pressure over next 24 hours is around 200 systolic     Elevated troponin  Assessment & Plan  Non chest pain associated elevation in troponin  Secondary to hypertensive urgency  Associated with sinus tachycardia, unclear etiology  Check ECHO, trend troponins, monitor on telemetry         VTE Pharmacologic Prophylaxis:   Moderate Risk (Score 3-4) - Pharmacological DVT Prophylaxis Ordered: heparin.  Code Status: Level 1 - Full Code   Discussion with family: Patient declined call to .     Anticipated Length of Stay: Patient will be admitted on an inpatient basis with an anticipated length of stay of greater than 2 midnights secondary to hypertensive urgency .    Total Time Spent on Date of Encounter in care of patient: 30+ mins. This time was spent on one or more of the following: performing physical exam; counseling and coordination of care; obtaining or reviewing history; documenting in the medical record; reviewing/ordering tests, medications or procedures; communicating with other healthcare professionals and discussing with patient's family/caregivers.    Chief Complaint: referral by PCP     History of Present Illness:  Tayler Villalobos is a 59 y.o. female with a PMH of htn who presents with referral by PCP for elevated blood pressure to the 200's. Patient reports no associated symptoms otherwise. Reports compliance with home medication of amlodipine. Reports taking it prior to her PCP visit. She reports her blood pressures are normally in  the 140-150's. In the ED, she was noted to be tachycardic with elevated troponin and admitted to [facility] for further management.    Review of Systems:  Review of Systems   Constitutional:  Negative for chills and fever.   HENT:  Negative for ear pain and sore throat.    Eyes:  Negative for pain and visual disturbance.   Respiratory:  Negative for cough and shortness of breath.    Cardiovascular:  Negative for chest pain and palpitations.   Gastrointestinal:  Negative for abdominal pain and vomiting.   Genitourinary:  Negative for dysuria and hematuria.   Musculoskeletal:  Negative for arthralgias and back pain.   Skin:  Negative for color change and rash.   Neurological:  Negative for seizures and syncope.   All other systems reviewed and are negative.      Past Medical and Surgical History:   Past Medical History:   Diagnosis Date    Hypertension     Iron deficiency anemia        Past Surgical History:   Procedure Laterality Date    CATARACT EXTRACTION      left       Meds/Allergies:  Prior to Admission medications    Medication Sig Start Date End Date Taking? Authorizing Provider   amLODIPine (NORVASC) 10 mg tablet TAKE 1 TABLET BY MOUTH EVERY DAY 6/14/22   Kehinde Quiroz MD   Ascorbic Acid, Vitamin C, (VITAMIN C) 100 MG tablet 1 tab(s)  Patient not taking: Reported on 5/6/2024 3/23/17   Historical Provider, MD   carvedilol (COREG) 12.5 mg tablet TAKE 1 TABLET BY MOUTH TWICE A DAY WITH FOOD  Patient not taking: Reported on 5/6/2024 8/26/22   Kehinde Quiroz MD   ferrous sulfate 325 (65 Fe) mg tablet TAKE 1 TABLET BY MOUTH TWICE A DAY  Patient not taking: Reported on 5/6/2024 12/5/22   Kehinde Quiroz MD   medroxyPROGESTERone (PROVERA) 10 mg tablet TAKE 1 TABLET BY MOUTH EVERY DAY  Patient not taking: Reported on 5/6/2024 5/18/23   Kehinde Quiroz MD     I have reviewed home medications using recent Epic encounter.    Allergies:   Allergies   Allergen Reactions    Other      Other reaction(s): Unknown       Social  History:  Marital Status: /Civil Union   Occupation: na  Patient Pre-hospital Living Situation: Home  Patient Pre-hospital Level of Mobility: walks  Patient Pre-hospital Diet Restrictions: na  Substance Use History:   Social History     Substance and Sexual Activity   Alcohol Use Never     Social History     Tobacco Use   Smoking Status Never   Smokeless Tobacco Never   Tobacco Comments    non tobacco user     Social History     Substance and Sexual Activity   Drug Use Never       Family History:  Family History   Problem Relation Age of Onset    Hypertension Mother     Hypertension Father        Physical Exam:     Vitals:   Blood Pressure: (!) 197/97 (05/06/24 1400)  Pulse: 91 (05/06/24 1400)  Temperature: 98.1 °F (36.7 °C) (05/06/24 1115)  Temp Source: Temporal (05/06/24 1115)  Respirations: 20 (05/06/24 1315)  SpO2: 98 % (05/06/24 1400)    Physical Exam  Constitutional:       General: She is not in acute distress.     Appearance: Normal appearance. She is not toxic-appearing.   Cardiovascular:      Rate and Rhythm: Regular rhythm. Tachycardia present.      Heart sounds: Normal heart sounds. No murmur heard.  Pulmonary:      Effort: Pulmonary effort is normal. No respiratory distress.      Breath sounds: Normal breath sounds. No wheezing.   Abdominal:      General: Abdomen is flat. There is no distension.      Palpations: Abdomen is soft.      Tenderness: There is no abdominal tenderness.   Neurological:      General: No focal deficit present.      Mental Status: She is alert and oriented to person, place, and time. Mental status is at baseline.      Motor: No weakness.          Additional Data:     Lab Results:  Results from last 7 days   Lab Units 05/06/24  1216   WBC Thousand/uL 5.93   HEMOGLOBIN g/dL 12.3   HEMATOCRIT % 41.9   PLATELETS Thousands/uL 140*   SEGS PCT % 69   LYMPHO PCT % 19   MONO PCT % 9   EOS PCT % 2     Results from last 7 days   Lab Units 05/06/24  1219   SODIUM mmol/L 137   POTASSIUM  mmol/L 3.6   CHLORIDE mmol/L 104   CO2 mmol/L 23   BUN mg/dL 9   CREATININE mg/dL 0.56*   ANION GAP mmol/L 10   CALCIUM mg/dL 10.1   ALBUMIN g/dL 4.5   TOTAL BILIRUBIN mg/dL 0.62   ALK PHOS U/L 93   ALT U/L 11   AST U/L 18   GLUCOSE RANDOM mg/dL 103                       Lines/Drains:  Invasive Devices       Peripheral Intravenous Line  Duration             Peripheral IV 05/06/24 Right Antecubital <1 day                        Imaging: No pertinent imaging reviewed.  No orders to display       EKG and Other Studies Reviewed on Admission:   EKG:  sinus.    ** Please Note: This note has been constructed using a voice recognition system. **

## 2024-05-06 NOTE — ASSESSMENT & PLAN NOTE
Elevated blood pressure to 257 systolic in ED  In association with elevated troponin   Did respond to IV push of labetalol   Resume home dose amlodipine in AM (took this morning)  Continue IV push labetalol  Goal blood pressure over next 24 hours is around 200 systolic

## 2024-05-06 NOTE — PROGRESS NOTES
Name: Tayler Villalobos      : 1965      MRN: [id number]  Encounter Provider: Sameera Grady PA-C  Encounter Date: 2024   Encounter department: [facility] [place]    Assessment & Plan     1. Hypertensive urgency  -     Transfer to other facility  Despite the patient stating she is asymptomatic, I discussed the significance and risks of extremely elevated blood pressure including heart attack and stroke and highly encouraged her to go to the ER for further evaluation.  She states that someone drove her to the office today, and she will have that person drive her to the [facility] ER.  She will reschedule her appointment with me for her physical.     2. Benign essential hypertension  Patient is prescribed 10 mg of amlodipine and 12.5 mg of carvedilol, but states that she ran out of carvedilol about 2 months ago and has only been taking that amlodipine since.          Subjective      Tayler is a 59-year-old female who presents for her annual physical, but her blood pressure is extremely elevated in the office today at 240/120 , and 260/120 with repeat reading taking manually.  The patient states she is asymptomatic and denies any chest pain, numbness, tingling, weakness, headaches, dizziness, changes in vision.      Review of Systems   Constitutional:  Negative for chills and fever.   HENT:  Negative for congestion and sore throat.    Eyes:  Negative for pain and visual disturbance.   Respiratory:  Negative for cough and shortness of breath.    Cardiovascular:  Negative for chest pain and palpitations.   Gastrointestinal:  Negative for abdominal pain, constipation and diarrhea.   Genitourinary:  Negative for dysuria and hematuria.   Musculoskeletal:  Negative for arthralgias and myalgias.   Skin:  Negative for color change and rash.   Neurological:  Negative for dizziness and headaches.       Current Outpatient Medications on File Prior to Visit   Medication Sig   • amLODIPine (NORVASC)  "10 mg tablet TAKE 1 TABLET BY MOUTH EVERY DAY   • Ascorbic Acid, Vitamin C, (VITAMIN C) 100 MG tablet 1 tab(s) (Patient not taking: Reported on 5/6/2024)   • carvedilol (COREG) 12.5 mg tablet TAKE 1 TABLET BY MOUTH TWICE A DAY WITH FOOD (Patient not taking: Reported on 5/6/2024)   • ferrous sulfate 325 (65 Fe) mg tablet TAKE 1 TABLET BY MOUTH TWICE A DAY (Patient not taking: Reported on 5/6/2024)   • medroxyPROGESTERone (PROVERA) 10 mg tablet TAKE 1 TABLET BY MOUTH EVERY DAY (Patient not taking: Reported on 5/6/2024)       Objective     BP (!) 260/120 (BP Location: Left arm, Patient Position: Sitting, Cuff Size: Standard)   Pulse (!) 122   Temp 98.4 °F (36.9 °C) (Tympanic)   Resp 18   Ht 5' 4\" (1.626 m)   Wt 74.8 kg (165 lb)   SpO2 99%   BMI 28.32 kg/m²     Physical Exam  Constitutional:       General: She is not in acute distress.     Appearance: Normal appearance. She is not ill-appearing or diaphoretic.   Eyes:      Extraocular Movements: Extraocular movements intact.      Conjunctiva/sclera: Conjunctivae normal.   Cardiovascular:      Rate and Rhythm: Tachycardia present.      Heart sounds: No murmur heard.     No friction rub. No gallop.   Pulmonary:      Effort: No respiratory distress.      Breath sounds: No wheezing, rhonchi or rales.   Musculoskeletal:         General: No swelling. Normal range of motion.   Skin:     General: Skin is warm and dry.   Neurological:      General: No focal deficit present.      Mental Status: She is alert.   Psychiatric:         Mood and Affect: Mood normal.       Sameera Grady PA-C    "

## 2024-05-06 NOTE — ED PROVIDER NOTES
Pt Name: Tayler Villalobos  MRN: [id number]  Birthdate 1965  Age/Sex: 59 y.o. female  Date of evaluation: 5/6/2024  PCP: No primary care provider on file.    CHIEF COMPLAINT    Chief Complaint   Patient presents with    Hypertension     Pt presents from PCP for high blood pressure, pt states every time she goes to the office her BP goes up. Denies CP/SOB.          HPI and MDM    59 y.o. female presenting with high blood pressure.  Patient went to her family doctor for a yearly physical.  While there her blood pressure was noted to be significantly elevated in the 200s systolic, therefore she was sent to the emergency department.  Patient denies any symptoms, no chest pain or shortness of breath, no visual changes, no headache, no numbness or tingling or weakness.  She does have a diagnosis of whitecoat hypertension.  Does also have a history of hypertension which she is on 10 mg of amlodipine, she took it this morning.  She does mention some anxiety.  She states she checked her blood pressure for the last 2 days they were 140 and 150 systolic.      Differential diagnosis considered includes but not limited to hypertension, hypertensive urgency, electrolyte abnormalities, anxiety.    Her heart rate was 106 before I walked into the room and then when I walked in and went up to 126.    Per my depend interpretation of EKG, sinus tachycardia with heart rate 139, narrow QRS, short AK interval, QTc prolonged, no STEMI.    Will continue to monitor patient in the ED.     ED Course as of 05/06/24 1820   Mon May 06, 2024   1313 Per my independent interpretation of repeat EKG, normal sinus rhythm rate of 87, narrow QRS, normal axis, no was reassuring, no STEMI.      Troponin is elevated, no chest pain, likely demand.  Patient given IV labetalol which she is improved for blood pressure and heart rate.  Discussed with internal medicine for hospitalization.    Medications   acetaminophen (TYLENOL) tablet 650 mg (has no  administration in time range)   calcium carbonate (TUMS) chewable tablet 1,000 mg (has no administration in time range)   docusate sodium (COLACE) capsule 100 mg (100 mg Oral Not Given 5/6/24 1702)   senna (SENOKOT) tablet 8.6 mg (8.6 mg Oral Not Given 5/6/24 1511)   ondansetron (ZOFRAN) injection 4 mg (has no administration in time range)   heparin (porcine) subcutaneous injection 5,000 Units (5,000 Units Subcutaneous Given 5/6/24 1514)   labetalol (NORMODYNE) injection 10 mg (10 mg Intravenous Not Given 5/6/24 1515)   amLODIPine (NORVASC) tablet 10 mg (has no administration in time range)   LORazepam (ATIVAN) injection 0.5 mg (0.5 mg Intravenous Given 5/6/24 1206)   sodium chloride 0.9 % bolus 500 mL (0 mL Intravenous Stopped 5/6/24 1317)   magnesium sulfate IVPB (premix) SOLN 1 g (0 g Intravenous Stopped 5/6/24 1317)   labetalol (NORMODYNE) injection 10 mg (10 mg Intravenous Given 5/6/24 1259)         Past Medical and Surgical History    Past Medical History:   Diagnosis Date    Hypertension     Iron deficiency anemia        Past Surgical History:   Procedure Laterality Date    CATARACT EXTRACTION      left       Family History   Problem Relation Age of Onset    Hypertension Mother     Hypertension Father        Social History     Tobacco Use    Smoking status: Never    Smokeless tobacco: Never    Tobacco comments:     non tobacco user   Vaping Use    Vaping status: Never Used   Substance Use Topics    Alcohol use: Never    Drug use: Never           Allergies    Allergies   Allergen Reactions    Other      Other reaction(s): Unknown       Home Medications    Prior to Admission medications    Medication Sig Start Date End Date Taking? Authorizing Provider   amLODIPine (NORVASC) 10 mg tablet TAKE 1 TABLET BY MOUTH EVERY DAY 6/14/22   Kehinde Quiroz MD   Ascorbic Acid, Vitamin C, (VITAMIN C) 100 MG tablet 1 tab(s)  Patient not taking: Reported on 5/6/2024 3/23/17   Historical Provider, MD   carvedilol (COREG) 12.5  mg tablet TAKE 1 TABLET BY MOUTH TWICE A DAY WITH FOOD  Patient not taking: Reported on 5/6/2024 8/26/22   Kehinde Quiroz MD   ferrous sulfate 325 (65 Fe) mg tablet TAKE 1 TABLET BY MOUTH TWICE A DAY  Patient not taking: Reported on 5/6/2024 12/5/22   Kehinde Quiroz MD   medroxyPROGESTERone (PROVERA) 10 mg tablet TAKE 1 TABLET BY MOUTH EVERY DAY  Patient not taking: Reported on 5/6/2024 5/18/23   Kehinde Quiroz MD           Physical Exam      ED Triage Vitals   Temperature Pulse Respirations Blood Pressure SpO2   05/06/24 1115 05/06/24 1115 05/06/24 1115 05/06/24 1115 05/06/24 1115   98.1 °F (36.7 °C) (!) 140 20 (!) 256/119 100 %      Temp Source Heart Rate Source Patient Position - Orthostatic VS BP Location FiO2 (%)   05/06/24 1115 05/06/24 1115 05/06/24 1115 05/06/24 1115 --   Temporal Monitor Sitting Left arm       Pain Score       05/06/24 1700       No Pain               Physical Exam  Constitutional:       General: She is not in acute distress.     Appearance: She is not ill-appearing.   HENT:      Head: Normocephalic and atraumatic.      Nose: Nose normal.      Mouth/Throat:      Mouth: Mucous membranes are moist.   Eyes:      Extraocular Movements: Extraocular movements intact.      Pupils: Pupils are equal, round, and reactive to light.   Cardiovascular:      Rate and Rhythm: Normal rate and regular rhythm.   Pulmonary:      Effort: No respiratory distress.      Breath sounds: Normal breath sounds. No wheezing.   Abdominal:      General: There is no distension.      Palpations: Abdomen is soft.      Tenderness: There is no abdominal tenderness.   Musculoskeletal:         General: No swelling or deformity. Normal range of motion.      Cervical back: Normal range of motion and neck supple.   Skin:     General: Skin is warm.      Findings: No erythema.   Neurological:      Mental Status: She is alert and oriented to person, place, and time. Mental status is at baseline.      Cranial Nerves: No cranial nerve  deficit.      Sensory: No sensory deficit.      Motor: No weakness.              Diagnostic Results      Labs:    Results Reviewed       Procedure Component Value Units Date/Time    HS Troponin I 4hr [[id number]]  (Abnormal) Collected: 05/06/24 1744    Lab Status: Final result Specimen: Blood from Arm, Right Updated: 05/06/24 1819     hs TnI 4hr 145 ng/L      Delta 4hr hsTnI 25 ng/L     HS Troponin I 2hr [[id number]]  (Abnormal) Collected: 05/06/24 1426    Lab Status: Final result Specimen: Blood from Arm, Right Updated: 05/06/24 1455     hs TnI 2hr 104 ng/L      Delta 2hr hsTnI -16 ng/L     HS Troponin 0hr (reflex protocol) [[id number]]  (Abnormal) Collected: 05/06/24 1219    Lab Status: Final result Specimen: Blood from Arm, Right Updated: 05/06/24 1245     hs TnI 0hr 120 ng/L     Comprehensive metabolic panel [[id number]]  (Abnormal) Collected: 05/06/24 1219    Lab Status: Final result Specimen: Blood from Arm, Right Updated: 05/06/24 1235     Sodium 137 mmol/L      Potassium 3.6 mmol/L      Chloride 104 mmol/L      CO2 23 mmol/L      ANION GAP 10 mmol/L      BUN 9 mg/dL      Creatinine 0.56 mg/dL      Glucose 103 mg/dL      Calcium 10.1 mg/dL      AST 18 U/L      ALT 11 U/L      Alkaline Phosphatase 93 U/L      Total Protein 8.9 g/dL      Albumin 4.5 g/dL      Total Bilirubin 0.62 mg/dL      eGFR 103 ml/min/1.73sq m     Narrative:      National Kidney Disease Foundation guidelines for Chronic Kidney Disease (CKD):     Stage 1 with normal or high GFR (GFR > 90 mL/min/1.73 square meters)    Stage 2 Mild CKD (GFR = 60-89 mL/min/1.73 square meters)    Stage 3A Moderate CKD (GFR = 45-59 mL/min/1.73 square meters)    Stage 3B Moderate CKD (GFR = 30-44 mL/min/1.73 square meters)    Stage 4 Severe CKD (GFR = 15-29 mL/min/1.73 square meters)    Stage 5 End Stage CKD (GFR <15 mL/min/1.73 square meters)  Note: GFR calculation is accurate only with a steady state creatinine    CBC and differential [[id number]]  (Abnormal)  Collected: 05/06/24 1216    Lab Status: Final result Specimen: Blood from Arm, Right Updated: 05/06/24 1221     WBC 5.93 Thousand/uL      RBC 5.55 Million/uL      Hemoglobin 12.3 g/dL      Hematocrit 41.9 %      MCV 76 fL      MCH 22.2 pg      MCHC 29.4 g/dL      RDW 18.7 %      Platelets 140 Thousands/uL      nRBC 0 /100 WBCs      Segmented % 69 %      Immature Grans % 1 %      Lymphocytes % 19 %      Monocytes % 9 %      Eosinophils Relative 2 %      Basophils Relative 0 %      Absolute Neutrophils 4.11 Thousands/µL      Absolute Immature Grans 0.03 Thousand/uL      Absolute Lymphocytes 1.13 Thousands/µL      Absolute Monocytes 0.52 Thousand/µL      Eosinophils Absolute 0.12 Thousand/µL      Basophils Absolute 0.02 Thousands/µL             All labs reviewed and utilized in the medical decision making process    Radiology:    No orders to display       All radiology studies independently viewed by me and interpreted by the radiologist.    Procedure    Procedures        FINAL IMPRESSION    Final diagnoses:   Hypertensive urgency   Elevated troponin I level         DISPOSITION    Time reflects when diagnosis was documented in both MDM as applicable and the Disposition within this note       Time User Action Codes Description Comment    5/6/2024  1:16 PM Konrad Townsend Add [I16.0] Hypertensive urgency     5/6/2024  1:16 PM Konrad Townsend Add [R79.89] Elevated troponin I level           ED Disposition       ED Disposition   Admit    Condition   Stable    Date/Time   Mon May 6, 2024  1:16 PM    Comment   Case was discussed with Dr. Onofre and the patient's admission status was agreed to be Admission Status: inpatient status to the service of Dr. Onofre.               Follow-up Information    None           PATIENT REFERRED TO:    No follow-up provider specified.    DISCHARGE MEDICATIONS:    Current Discharge Medication List        CONTINUE these medications which have NOT CHANGED    Details   amLODIPine (NORVASC) 10 mg tablet  TAKE 1 TABLET BY MOUTH EVERY DAY  Qty: 90 tablet, Refills: 3    Comments: DX Code Needed  .  Associated Diagnoses: Essential hypertension      Ascorbic Acid, Vitamin C, (VITAMIN C) 100 MG tablet 1 tab(s)      carvedilol (COREG) 12.5 mg tablet TAKE 1 TABLET BY MOUTH TWICE A DAY WITH FOOD  Qty: 60 tablet, Refills: 4    Associated Diagnoses: Benign essential hypertension      ferrous sulfate 325 (65 Fe) mg tablet TAKE 1 TABLET BY MOUTH TWICE A DAY  Qty: 60 tablet, Refills: 0    Associated Diagnoses: Blood loss anemia      medroxyPROGESTERone (PROVERA) 10 mg tablet TAKE 1 TABLET BY MOUTH EVERY DAY  Qty: 30 tablet, Refills: 1    Associated Diagnoses: Acute blood loss anemia; Vaginal bleeding             No discharge procedures on file.         Konrad Townsend DO        This note was partially completed using voice recognition technology, and was scanned for gross errors; however some errors may still exist. Please contact the author with any questions or requests for clarification.      Konrad Townsend DO  05/06/24 [id number]

## 2024-05-06 NOTE — ASSESSMENT & PLAN NOTE
Non chest pain associated elevation in troponin  Secondary to hypertensive urgency  Associated with sinus tachycardia, unclear etiology  Check ECHO, trend troponins, monitor on telemetry

## 2024-05-07 ENCOUNTER — APPOINTMENT (INPATIENT)
Dept: VASCULAR ULTRASOUND | Facility: HOSPITAL | Age: 59
DRG: 199 | End: 2024-05-07
Payer: COMMERCIAL

## 2024-05-07 ENCOUNTER — APPOINTMENT (INPATIENT)
Dept: NON INVASIVE DIAGNOSTICS | Facility: HOSPITAL | Age: 59
DRG: 199 | End: 2024-05-07
Payer: COMMERCIAL

## 2024-05-07 LAB
ANION GAP SERPL CALCULATED.3IONS-SCNC: 5 MMOL/L (ref 4–13)
AORTIC ROOT: 3.1 CM
AORTIC VALVE MEAN VELOCITY: 12.7 M/S
APICAL FOUR CHAMBER EJECTION FRACTION: 56 %
ASCENDING AORTA: 3 CM
AV LVOT MEAN GRADIENT: 3 MMHG
AV LVOT PEAK GRADIENT: 4 MMHG
AV MEAN GRADIENT: 7 MMHG
AV PEAK GRADIENT: 13 MMHG
BSA FOR ECHO PROCEDURE: 1.8 M2
BUN SERPL-MCNC: 12 MG/DL (ref 5–25)
CALCIUM SERPL-MCNC: 10 MG/DL (ref 8.4–10.2)
CHLORIDE SERPL-SCNC: 104 MMOL/L (ref 96–108)
CHOLEST SERPL-MCNC: 164 MG/DL
CO2 SERPL-SCNC: 29 MMOL/L (ref 21–32)
CREAT SERPL-MCNC: 0.62 MG/DL (ref 0.6–1.3)
DOP CALC AO VTI: 32.9 CM
DOP CALC LVOT PEAK VEL VTI: 20.1 CM
DOP CALC LVOT PEAK VEL: 0.99 M/S
E WAVE DECELERATION TIME: 174 MS
E/A RATIO: 0.73
ERYTHROCYTE [DISTWIDTH] IN BLOOD BY AUTOMATED COUNT: 18.6 % (ref 11.6–15.1)
EST. AVERAGE GLUCOSE BLD GHB EST-MCNC: 128 MG/DL
FRACTIONAL SHORTENING: 37 (ref 28–44)
GFR SERPL CREATININE-BSD FRML MDRD: 99 ML/MIN/1.73SQ M
GLUCOSE SERPL-MCNC: 100 MG/DL (ref 65–140)
HBA1C MFR BLD: 6.1 %
HCT VFR BLD AUTO: 37.9 % (ref 34.8–46.1)
HDLC SERPL-MCNC: 41 MG/DL
HGB BLD-MCNC: 11.4 G/DL (ref 11.5–15.4)
INTERVENTRICULAR SEPTUM IN DIASTOLE (PARASTERNAL SHORT AXIS VIEW): 1.3 CM
INTERVENTRICULAR SEPTUM: 1.3 CM (ref 0.6–1.1)
LA/AORTA RATIO 2D: 1.26
LAAS-AP2: 22.5 CM2
LAAS-AP4: 11.4 CM2
LDLC SERPL CALC-MCNC: 101 MG/DL (ref 0–100)
LEFT ATRIUM SIZE: 3.9 CM
LEFT INTERNAL DIMENSION IN SYSTOLE: 2.4 CM (ref 2.1–4)
LEFT VENTRICULAR INTERNAL DIMENSION IN DIASTOLE: 3.8 CM (ref 3.5–6)
LEFT VENTRICULAR POSTERIOR WALL IN END DIASTOLE: 1.3 CM
LEFT VENTRICULAR STROKE VOLUME: 39 ML
LVSV (TEICH): 39 ML
MAGNESIUM SERPL-MCNC: 2.2 MG/DL (ref 1.9–2.7)
MCH RBC QN AUTO: 22.5 PG (ref 26.8–34.3)
MCHC RBC AUTO-ENTMCNC: 30.1 G/DL (ref 31.4–37.4)
MCV RBC AUTO: 75 FL (ref 82–98)
MV E'TISSUE VEL-SEP: 6 CM/S
MV PEAK A VEL: 1.17 M/S
MV PEAK E VEL: 85 CM/S
MV STENOSIS PRESSURE HALF TIME: 51 MS
MV VALVE AREA P 1/2 METHOD: 4.31
NONHDLC SERPL-MCNC: 123 MG/DL
PLATELET # BLD AUTO: 152 THOUSANDS/UL (ref 149–390)
POTASSIUM SERPL-SCNC: 3.7 MMOL/L (ref 3.5–5.3)
PTH-INTACT SERPL-MCNC: 31.5 PG/ML (ref 12–88)
RBC # BLD AUTO: 5.07 MILLION/UL (ref 3.81–5.12)
RIGHT VENTRICLE ID DIMENSION: 3 CM
SL CV LV EF: 60
SL CV PED ECHO LEFT VENTRICLE DIASTOLIC VOLUME (MOD BIPLANE) 2D: 60 ML
SL CV PED ECHO LEFT VENTRICLE SYSTOLIC VOLUME (MOD BIPLANE) 2D: 21 ML
SODIUM SERPL-SCNC: 138 MMOL/L (ref 135–147)
T4 FREE SERPL-MCNC: 2.39 NG/DL (ref 0.61–1.12)
TR MAX PG: 33 MMHG
TR PEAK VELOCITY: 2.9 M/S
TRICUSPID ANNULAR PLANE SYSTOLIC EXCURSION: 2 CM
TRICUSPID VALVE PEAK REGURGITATION VELOCITY: 2.88 M/S
TRIGL SERPL-MCNC: 108 MG/DL
TSH SERPL DL<=0.05 MIU/L-ACNC: <0.01 UIU/ML (ref 0.45–4.5)
WBC # BLD AUTO: 4.33 THOUSAND/UL (ref 4.31–10.16)

## 2024-05-07 PROCEDURE — 83970 ASSAY OF PARATHORMONE: CPT | Performed by: STUDENT IN AN ORGANIZED HEALTH CARE EDUCATION/TRAINING PROGRAM

## 2024-05-07 PROCEDURE — 83036 HEMOGLOBIN GLYCOSYLATED A1C: CPT | Performed by: STUDENT IN AN ORGANIZED HEALTH CARE EDUCATION/TRAINING PROGRAM

## 2024-05-07 PROCEDURE — 93306 TTE W/DOPPLER COMPLETE: CPT | Performed by: INTERNAL MEDICINE

## 2024-05-07 PROCEDURE — 85027 COMPLETE CBC AUTOMATED: CPT | Performed by: STUDENT IN AN ORGANIZED HEALTH CARE EDUCATION/TRAINING PROGRAM

## 2024-05-07 PROCEDURE — 84439 ASSAY OF FREE THYROXINE: CPT | Performed by: STUDENT IN AN ORGANIZED HEALTH CARE EDUCATION/TRAINING PROGRAM

## 2024-05-07 PROCEDURE — 80061 LIPID PANEL: CPT | Performed by: STUDENT IN AN ORGANIZED HEALTH CARE EDUCATION/TRAINING PROGRAM

## 2024-05-07 PROCEDURE — 99233 SBSQ HOSP IP/OBS HIGH 50: CPT | Performed by: STUDENT IN AN ORGANIZED HEALTH CARE EDUCATION/TRAINING PROGRAM

## 2024-05-07 PROCEDURE — 80048 BASIC METABOLIC PNL TOTAL CA: CPT | Performed by: STUDENT IN AN ORGANIZED HEALTH CARE EDUCATION/TRAINING PROGRAM

## 2024-05-07 PROCEDURE — 93306 TTE W/DOPPLER COMPLETE: CPT

## 2024-05-07 PROCEDURE — 93975 VASCULAR STUDY: CPT

## 2024-05-07 PROCEDURE — 83735 ASSAY OF MAGNESIUM: CPT | Performed by: STUDENT IN AN ORGANIZED HEALTH CARE EDUCATION/TRAINING PROGRAM

## 2024-05-07 PROCEDURE — 84443 ASSAY THYROID STIM HORMONE: CPT | Performed by: STUDENT IN AN ORGANIZED HEALTH CARE EDUCATION/TRAINING PROGRAM

## 2024-05-07 RX ORDER — AMLODIPINE BESYLATE 10 MG/1
10 TABLET ORAL DAILY
Status: DISCONTINUED | OUTPATIENT
Start: 2024-05-07 | End: 2024-05-10 | Stop reason: HOSPADM

## 2024-05-07 RX ORDER — CARVEDILOL 12.5 MG/1
12.5 TABLET ORAL ONCE
Status: COMPLETED | OUTPATIENT
Start: 2024-05-07 | End: 2024-05-07

## 2024-05-07 RX ORDER — CHLORTHALIDONE 25 MG/1
12.5 TABLET ORAL DAILY
Status: DISCONTINUED | OUTPATIENT
Start: 2024-05-07 | End: 2024-05-08

## 2024-05-07 RX ORDER — CARVEDILOL 12.5 MG/1
12.5 TABLET ORAL 2 TIMES DAILY WITH MEALS
Status: DISCONTINUED | OUTPATIENT
Start: 2024-05-07 | End: 2024-05-07

## 2024-05-07 RX ORDER — CARVEDILOL 12.5 MG/1
25 TABLET ORAL 2 TIMES DAILY WITH MEALS
Status: DISCONTINUED | OUTPATIENT
Start: 2024-05-07 | End: 2024-05-10 | Stop reason: HOSPADM

## 2024-05-07 RX ORDER — LORAZEPAM 0.5 MG/1
0.25 TABLET ORAL ONCE
Status: COMPLETED | OUTPATIENT
Start: 2024-05-07 | End: 2024-05-07

## 2024-05-07 RX ORDER — HYDRALAZINE HYDROCHLORIDE 20 MG/ML
10 INJECTION INTRAMUSCULAR; INTRAVENOUS EVERY 6 HOURS PRN
Status: DISCONTINUED | OUTPATIENT
Start: 2024-05-07 | End: 2024-05-10 | Stop reason: HOSPADM

## 2024-05-07 RX ADMIN — LABETALOL HYDROCHLORIDE 10 MG: 5 INJECTION, SOLUTION INTRAVENOUS at 08:18

## 2024-05-07 RX ADMIN — CHLORTHALIDONE 12.5 MG: 25 TABLET ORAL at 18:13

## 2024-05-07 RX ADMIN — AMLODIPINE BESYLATE 10 MG: 10 TABLET ORAL at 06:55

## 2024-05-07 RX ADMIN — LORAZEPAM 0.25 MG: 0.5 TABLET ORAL at 14:24

## 2024-05-07 RX ADMIN — HYDRALAZINE HYDROCHLORIDE 10 MG: 20 INJECTION INTRAMUSCULAR; INTRAVENOUS at 19:10

## 2024-05-07 RX ADMIN — CARVEDILOL 12.5 MG: 12.5 TABLET, FILM COATED ORAL at 11:19

## 2024-05-07 RX ADMIN — CARVEDILOL 25 MG: 12.5 TABLET, FILM COATED ORAL at 17:05

## 2024-05-07 RX ADMIN — CARVEDILOL 12.5 MG: 12.5 TABLET, FILM COATED ORAL at 14:24

## 2024-05-07 NOTE — PLAN OF CARE
Problem: INFECTION - ADULT  Goal: Absence or prevention of progression during hospitalization  Description: INTERVENTIONS:  - Assess and monitor for signs and symptoms of infection  - Monitor lab/diagnostic results  - Monitor all insertion sites, i.e. indwelling lines, tubes, and drains  - Monitor endotracheal if appropriate and nasal secretions for changes in amount and color  - [place] appropriate cooling/warming therapies per order  - Administer medications as ordered  - Instruct and encourage patient and family to use good hand hygiene technique  - Identify and instruct in appropriate isolation precautions for identified infection/condition  Outcome: Progressing     Problem: SAFETY ADULT  Goal: Patient will remain free of falls  Description: INTERVENTIONS:  - Educate patient/family on patient safety including physical limitations  - Instruct patient to call for assistance with activity   - Consult OT/PT to assist with strengthening/mobility   - Keep Call bell within reach  - Keep bed low and locked with side rails adjusted as appropriate  - Keep care items and personal belongings within reach  - Initiate and maintain comfort rounds  - Make Fall Risk Sign visible to staff  - Apply yellow socks and bracelet for high fall risk patients  - Consider moving patient to room near nurses station  Outcome: Progressing

## 2024-05-07 NOTE — ASSESSMENT & PLAN NOTE
Elevated blood pressure to 257 systolic in ED  In association with elevated troponin   Did respond to IV push of labetalol   Resume home dose amlodipine in AM (took this morning)  Today's updated plan:  Patient has a family history of hypertension.  She reported that her blood pressure runs around 140 systolic  diastolic.  She also reported that she has whitecoat phenomena.  Patient denied any chest pain, headache, nausea, vomiting and diarrhea  Started Coreg 25 mg twice daily with chlorthalidone 12.5 mg daily  IV hydralazine 10 mg as needed  Will check TSH, intact PTH, glycohemoglobin, lipid profile and renal artery Doppler to look for stenosis

## 2024-05-07 NOTE — UTILIZATION REVIEW
Initial Clinical Review    Admission: Date/Time/Statement:   Admission Orders (From admission, onward)       Ordered        05/06/24 1316  INPATIENT ADMISSION  Once                          Orders Placed This Encounter   Procedures    INPATIENT ADMISSION     Standing Status:   Standing     Number of Occurrences:   1     Order Specific Question:   Level of Care     Answer:   Med Surg [16]     Order Specific Question:   Estimated length of stay     Answer:   More than 2 Midnights     Order Specific Question:   Certification     Answer:   I certify that inpatient services are medically necessary for this patient for a duration of greater than two midnights. See H&P and MD Progress Notes for additional information about the patient's course of treatment.     ED Arrival Information       Expected   5/6/2024     Arrival   5/6/2024 11:08    Acuity   Emergent              Means of arrival   Walk-In    Escorted by   Self    Service   Hospitalist    Admission type   Emergency              Arrival complaint   Benign essential hypertension             Chief Complaint   Patient presents with    Hypertension     Pt presents from PCP for high blood pressure, pt states every time she goes to the office her BP goes up. Denies CP/SOB.        Initial Presentation: 59 y.o. female to ED from home w/ PMH of htn who presents with referral by PCP for elevated blood pressure to the 200's. Patient reports no associated symptoms otherwise. Compliance with amlodipine. Reports her blood pressures are normally in the 140-150's. In the ED, she was noted to be tachycardic with elevated troponin and /119. Given IV labetalol w/ good response. Tachycardia , unclear etiology . Admitted IP status w / HTN urgency , elevated trop and tachycardia . Plan to resume am amlodipine , iv labetalol prn , echo ,trend trop , tele .     Anticipated Length of Stay/Certification Statement: Patient will be admitted on an inpatient basis with an anticipated  length of stay of greater than 2 midnights secondary to hypertensive urgency .     Date: 5/7   Day 2: Will check TSH, intact PTH, glycohemoglobin, lipid profile and renal artery Doppler to look for stenosis . Iv hydralazine prn . Started Coreg 25 mg twice daily with chlorthalidone 12.5 mg daily . Pt feeling anxious and wants to go home . BP remains elevated .     ED Triage Vitals   Temperature Pulse Respirations Blood Pressure SpO2   05/06/24 1115 05/06/24 1115 05/06/24 1115 05/06/24 1115 05/06/24 1115   98.1 °F (36.7 °C) (!) 140 20 (!) 256/119 100 %      Temp Source Heart Rate Source Patient Position - Orthostatic VS BP Location FiO2 (%)   05/06/24 1115 05/06/24 1115 05/06/24 1115 05/06/24 1115 --   Temporal Monitor Sitting Left arm       Pain Score       05/06/24 1700       No Pain          Wt Readings from Last 1 Encounters:   05/07/24 74.8 kg (165 lb)     Additional Vital Signs:   05/07/24 0800 -- 89 -- 202/109 Abnormal  -- -- -- --   05/07/24 07:37:49 98.2 °F (36.8 °C) 89 -- 202/109 Abnormal  140 98 % -- --   05/07/24 0533 -- -- -- 194/92 Abnormal  -- -- -- Lying   05/07/24 05:28:10 -- 92 -- 198/110 Abnormal  139 99 % -- Lying   05/06/24 2040 -- -- -- -- -- 98 % None (Room air) --   05/06/24 19:39:55 98 °F (36.7 °C) 91 18 198/108 Abnormal  138 99 % -- Sitting   05/06/24 17:31:09 -- 109 Abnormal  -- 197/114 Abnormal  142 99 % -- --   05/06/24 1700 98 °F (36.7 °C) -- -- -- -- -- None (Room air) --   05/06/24 16:51:47 -- 102 -- 205/116 Abnormal  146 96 % -- --   05/06/24 15:08:56 97.4 °F (36.3 °C) Abnormal  100 -- 203/121 Abnormal  148 96 % -- --   05/06/24 1400 -- 91 -- 197/97 Abnormal  139 98 % -- --   05/06/24 1315 -- 89 20 191/91 Abnormal  131 98 % -- --   05/06/24 1230 -- 112 Abnormal  20 214/101 Abnormal  145 99 % -- --   05/06/24 1146 -- 117 Abnormal  -- -- -- -- --      Pertinent Labs/Diagnostic Test Results:   No orders to display         Results from last 7 days   Lab Units 05/07/24  0443 05/06/24  1216    WBC Thousand/uL 4.33 5.93   HEMOGLOBIN g/dL 11.4* 12.3   HEMATOCRIT % 37.9 41.9   PLATELETS Thousands/uL 152 140*   TOTAL NEUT ABS Thousands/µL  --  4.11         Results from last 7 days   Lab Units 05/07/24  0443 05/06/24  1219   SODIUM mmol/L 138 137   POTASSIUM mmol/L 3.7 3.6   CHLORIDE mmol/L 104 104   CO2 mmol/L 29 23   ANION GAP mmol/L 5 10   BUN mg/dL 12 9   CREATININE mg/dL 0.62 0.56*   EGFR ml/min/1.73sq m 99 103   CALCIUM mg/dL 10.0 10.1   MAGNESIUM mg/dL 2.2  --      Results from last 7 days   Lab Units 05/06/24  1219   AST U/L 18   ALT U/L 11   ALK PHOS U/L 93   TOTAL PROTEIN g/dL 8.9*   ALBUMIN g/dL 4.5   TOTAL BILIRUBIN mg/dL 0.62     Results from last 7 days   Lab Units 05/07/24  0443 05/06/24  1219   GLUCOSE RANDOM mg/dL 100 103     Results from last 7 days   Lab Units 05/06/24  1744 05/06/24  1426 05/06/24  1219   HS TNI 0HR ng/L  --   --  120*   HS TNI 2HR ng/L  --  104*  --    HSTNI D2 ng/L  --  -16  --    HS TNI 4HR ng/L 145*  --   --    HSTNI D4 ng/L 25*  --   --      ED Treatment:   Medication Administration from 05/06/2024 1051 to 05/06/2024 1507         Date/Time Order Dose Route Action     05/06/2024 1206 EDT LORazepam (ATIVAN) injection 0.5 mg 0.5 mg Intravenous Given     05/06/2024 1206 EDT sodium chloride 0.9 % bolus 500 mL 500 mL Intravenous New Bag     05/06/2024 1208 EDT magnesium sulfate IVPB (premix) SOLN 1 g 1 g Intravenous New Bag     05/06/2024 1259 EDT labetalol (NORMODYNE) injection 10 mg 10 mg Intravenous Given          Past Medical History:   Diagnosis Date    Hypertension     Iron deficiency anemia      Present on Admission:  **None**      Admitting Diagnosis: Hypertension [I10]  Hypertensive urgency [I16.0]  Elevated troponin I level [R79.89]  Age/Sex: 59 y.o. female  Admission Orders:  Scheduled Medications:  amLODIPine, 10 mg, Oral, Daily  docusate sodium, 100 mg, Oral, BID  heparin (porcine), 5,000 Units, Subcutaneous, Q8H FREDRICK  senna, 1 tablet, Oral,  Daily      Continuous IV Infusions:     PRN Meds:  acetaminophen, 650 mg, Oral, Q6H PRN  calcium carbonate, 1,000 mg, Oral, Daily PRN  labetalol, 10 mg, Intravenous, Q6H PRN  5/6 x1  5/7 x1  ondansetron, 4 mg, Intravenous, Q6H PRN    Tele   SCD  I&O   Up and OOB   Cardiac diet           Network Utilization Review Department  ATTENTION: Please call with any questions or concerns to [phone] and carefully listen to the prompts so that you are directed to the right person. All voicemails are confidential.   For Discharge needs, contact Care Management DC Support Team at [phone] opt. 2  Send all requests for admission clinical reviews, approved or denied determinations and any other requests to dedicated fax number below belonging to the campus where the patient is receiving treatment. List of dedicated fax numbers for the Facilities:  FACILITY NAME UR FAX NUMBER   ADMISSION DENIALS (Administrative/Medical Necessity) [phone]   DISCHARGE SUPPORT TEAM (NETWORK) [phone]   PARENT CHILD HEALTH (Maternity/NICU/Pediatrics) [phone]   [facility] [phone]   [facility] [phone]   Critical access hospital [phone]   [facility] [phone]   [facility] [phone]   [facility] [phone]   [facility] [phone]   [facility] [phone]   [facility] [phone]   [facility] [phone]   [facility] [phone]   [facility] [phone]

## 2024-05-07 NOTE — ASSESSMENT & PLAN NOTE
Non chest pain associated elevation in troponin  Secondary to hypertensive urgency  Associated with sinus tachycardia, unclear etiology  Echo showed diastolic heart failure with normal EF

## 2024-05-07 NOTE — UTILIZATION REVIEW
NOTIFICATION OF INPATIENT ADMISSION   AUTHORIZATION REQUEST   SERVICING FACILITY:   [place] [address]  Tax ID: [id number]  NPI: [id number] ATTENDING PROVIDER:  Attending Name and NPI#: Raheem Crandall Md [[id number]]  Address: [address] [place] [address]  Phone: [phone]     ADMISSION INFORMATION:  Place of Service: Inpatient [facility]  Place of Service Code: 21  Inpatient Admission Date/Time: 5/6/24  1:16 PM  Discharge Date/Time: No discharge date for patient encounter.  Admitting Diagnosis Code/Description:  Hypertension [I10]  Hypertensive urgency [I16.0]  Elevated troponin I level [R79.89]     UTILIZATION REVIEW CONTACT:  Yamini Khan Utilization   Network Utilization Review Department  Phone: [phone]  Fax [phone]  Email: [email or web address]  Contact for approvals/pending authorizations, clinical reviews, and discharge.     PHYSICIAN ADVISORY SERVICES:  Medical Necessity Denial & [facility] Review  Phone: [phone]  Fax: [phone]  Email: [email or web address]     DISCHARGE SUPPORT TEAM:  For Patients Discharge Needs & Updates  Phone: [phone] opt. 2 Fax: [phone]  Email: [email or web address]

## 2024-05-08 PROBLEM — R73.03 PREDIABETES: Status: ACTIVE | Noted: 2024-05-08

## 2024-05-08 PROBLEM — E78.5 DYSLIPIDEMIA: Status: ACTIVE | Noted: 2024-05-08

## 2024-05-08 PROBLEM — I70.1 RENAL ARTERY STENOSIS (HCC): Status: ACTIVE | Noted: 2024-05-08

## 2024-05-08 LAB
ANION GAP SERPL CALCULATED.3IONS-SCNC: 7 MMOL/L (ref 4–13)
BUN SERPL-MCNC: 16 MG/DL (ref 5–25)
CALCIUM SERPL-MCNC: 10.7 MG/DL (ref 8.4–10.2)
CHLORIDE SERPL-SCNC: 103 MMOL/L (ref 96–108)
CO2 SERPL-SCNC: 27 MMOL/L (ref 21–32)
CREAT SERPL-MCNC: 0.72 MG/DL (ref 0.6–1.3)
ERYTHROCYTE [DISTWIDTH] IN BLOOD BY AUTOMATED COUNT: 19 % (ref 11.6–15.1)
GFR SERPL CREATININE-BSD FRML MDRD: 92 ML/MIN/1.73SQ M
GLUCOSE SERPL-MCNC: 122 MG/DL (ref 65–140)
HCT VFR BLD AUTO: 41.4 % (ref 34.8–46.1)
HGB BLD-MCNC: 12.8 G/DL (ref 11.5–15.4)
MCH RBC QN AUTO: 22.8 PG (ref 26.8–34.3)
MCHC RBC AUTO-ENTMCNC: 30.9 G/DL (ref 31.4–37.4)
MCV RBC AUTO: 74 FL (ref 82–98)
PHOSPHATE SERPL-MCNC: 4.7 MG/DL (ref 2.7–4.5)
PLATELET # BLD AUTO: 175 THOUSANDS/UL (ref 149–390)
POTASSIUM SERPL-SCNC: 4 MMOL/L (ref 3.5–5.3)
RBC # BLD AUTO: 5.61 MILLION/UL (ref 3.81–5.12)
SODIUM SERPL-SCNC: 137 MMOL/L (ref 135–147)
WBC # BLD AUTO: 4.17 THOUSAND/UL (ref 4.31–10.16)

## 2024-05-08 PROCEDURE — 86376 MICROSOMAL ANTIBODY EACH: CPT | Performed by: INTERNAL MEDICINE

## 2024-05-08 PROCEDURE — 84445 ASSAY OF TSI GLOBULIN: CPT | Performed by: INTERNAL MEDICINE

## 2024-05-08 PROCEDURE — 99255 IP/OBS CONSLTJ NEW/EST HI 80: CPT | Performed by: SURGERY

## 2024-05-08 PROCEDURE — 99255 IP/OBS CONSLTJ NEW/EST HI 80: CPT | Performed by: INTERNAL MEDICINE

## 2024-05-08 PROCEDURE — 84100 ASSAY OF PHOSPHORUS: CPT | Performed by: STUDENT IN AN ORGANIZED HEALTH CARE EDUCATION/TRAINING PROGRAM

## 2024-05-08 PROCEDURE — 99254 IP/OBS CNSLTJ NEW/EST MOD 60: CPT | Performed by: INTERNAL MEDICINE

## 2024-05-08 PROCEDURE — 93975 VASCULAR STUDY: CPT | Performed by: SURGERY

## 2024-05-08 PROCEDURE — 99232 SBSQ HOSP IP/OBS MODERATE 35: CPT | Performed by: STUDENT IN AN ORGANIZED HEALTH CARE EDUCATION/TRAINING PROGRAM

## 2024-05-08 PROCEDURE — 86800 THYROGLOBULIN ANTIBODY: CPT | Performed by: INTERNAL MEDICINE

## 2024-05-08 PROCEDURE — 80048 BASIC METABOLIC PNL TOTAL CA: CPT | Performed by: STUDENT IN AN ORGANIZED HEALTH CARE EDUCATION/TRAINING PROGRAM

## 2024-05-08 PROCEDURE — 85027 COMPLETE CBC AUTOMATED: CPT | Performed by: STUDENT IN AN ORGANIZED HEALTH CARE EDUCATION/TRAINING PROGRAM

## 2024-05-08 RX ORDER — HYDRALAZINE HYDROCHLORIDE 25 MG/1
50 TABLET, FILM COATED ORAL 2 TIMES DAILY
Status: DISCONTINUED | OUTPATIENT
Start: 2024-05-08 | End: 2024-05-08

## 2024-05-08 RX ORDER — CHLORTHALIDONE 25 MG/1
25 TABLET ORAL DAILY
Status: DISCONTINUED | OUTPATIENT
Start: 2024-05-09 | End: 2024-05-10 | Stop reason: HOSPADM

## 2024-05-08 RX ORDER — HYDRALAZINE HYDROCHLORIDE 25 MG/1
50 TABLET, FILM COATED ORAL EVERY 8 HOURS SCHEDULED
Status: DISCONTINUED | OUTPATIENT
Start: 2024-05-08 | End: 2024-05-09

## 2024-05-08 RX ORDER — ATORVASTATIN CALCIUM 20 MG/1
20 TABLET, FILM COATED ORAL
Status: DISCONTINUED | OUTPATIENT
Start: 2024-05-08 | End: 2024-05-10 | Stop reason: HOSPADM

## 2024-05-08 RX ADMIN — HYDRALAZINE HYDROCHLORIDE 50 MG: 25 TABLET ORAL at 14:56

## 2024-05-08 RX ADMIN — AMLODIPINE BESYLATE 10 MG: 10 TABLET ORAL at 08:09

## 2024-05-08 RX ADMIN — HYDRALAZINE HYDROCHLORIDE 10 MG: 20 INJECTION INTRAMUSCULAR; INTRAVENOUS at 03:20

## 2024-05-08 RX ADMIN — HYDRALAZINE HYDROCHLORIDE 50 MG: 25 TABLET ORAL at 22:05

## 2024-05-08 RX ADMIN — CARVEDILOL 25 MG: 12.5 TABLET, FILM COATED ORAL at 08:09

## 2024-05-08 RX ADMIN — CARVEDILOL 25 MG: 12.5 TABLET, FILM COATED ORAL at 17:16

## 2024-05-08 RX ADMIN — ATORVASTATIN CALCIUM 20 MG: 20 TABLET, FILM COATED ORAL at 17:16

## 2024-05-08 RX ADMIN — CHLORTHALIDONE 12.5 MG: 25 TABLET ORAL at 08:09

## 2024-05-08 RX ADMIN — HYDRALAZINE HYDROCHLORIDE 50 MG: 25 TABLET ORAL at 09:56

## 2024-05-08 RX ADMIN — HEPARIN SODIUM 5000 UNITS: 5000 INJECTION INTRAVENOUS; SUBCUTANEOUS at 14:56

## 2024-05-08 NOTE — TELEMEDICINE
REQUIRED DOCUMENTATION:     1. This service was provided via Telemedicine.  2. Provider located at [facility] for diabetes and endocrinology, [place]  3. TeleMed provider: Elisha Garza MD.  4. Identify all parties in room with patient during tele consult: none    5.Patient was then informed that this was a Telemedicine visit and that the exam was being conducted confidentially over secure lines. My office door was closed. No one else was in the room.  Patient acknowledged consent and understanding of privacy and security of the Telemedicine visit, and gave us permission to have the assistant stay in the room in order to assist with the history and to conduct the exam.  I informed the patient that I have reviewed their record in Epic and presented the opportunity for them to ask any questions regarding the visit today.  The patient agreed to participate.          Consultation - Tayler Villalobos 59 y.o. female MRN: [id number]    Unit/Bed#: -01 Encounter: [id number]      Assessment/Plan   59-year-old female with history of hypertension admitted for hypertensive urgency-during workup was found to have an elevated free T4 and suppressed TSH  Differential includes Graves' disease, thyroiditis, or toxic thyroid nodules.  For now will check TSI as well as TPO and thyroglobulin antibodies.  If TSI is positive she should be started on methimazole and if not would suggest repeating thyroid function test in the next 3 to 4 weeks  She should have outpatient follow-up with endocrinology 4 to 6 weeks after discharge    CC: hyperthyroidism Consult    History of Present Illness         HPI: Tayler Villalobos is a 59 y.o. year old female with history of HTN who was admitted to the hospital for Hypertensive urgency   History of HTN for about 6 years and at home was taking 1 agent    No prior  history of thyroid disease   No neck pain , no URI , no Ct scan with iv dye   No otc supplements other then vitamin C , D,  E    No weight loss , palpitations when anxious , sleeping ok ,  no hyper defecation, 1-2  BM PER DAY , occasional hot flashes since menopause - better recently , no tremors     No eye issues     BP at home  140-150/70-80    Inpatient consult to Endocrinology  Consult performed by: Elisha Garza MD  Consult ordered by: Raheem Crandall MD          Review of Systems    Historical Information   Past Medical History:   Diagnosis Date    Hypertension     Iron deficiency anemia      Past Surgical History:   Procedure Laterality Date    CATARACT EXTRACTION      left     Social History   Social History     Substance and Sexual Activity   Alcohol Use Never     Social History     Substance and Sexual Activity   Drug Use Never     Social History     Tobacco Use   Smoking Status Never   Smokeless Tobacco Never   Tobacco Comments    non tobacco user     Family History:   Family History   Problem Relation Age of Onset    Hypertension Mother     Hypertension Father    No FH of thyroid issues     Meds/Allergies   Current Facility-Administered Medications   Medication Dose Route Frequency Provider Last Rate Last Admin    acetaminophen (TYLENOL) tablet 650 mg  650 mg Oral Q6H PRN Dev Onofre MD        amLODIPine (NORVASC) tablet 10 mg  10 mg Oral Daily Sully Wilhelm DO   10 mg at 05/08/24 0809    calcium carbonate (TUMS) chewable tablet 1,000 mg  1,000 mg Oral Daily PRN Dev Onofre MD        carvedilol (COREG) tablet 25 mg  25 mg Oral BID With Meals Raheem Crandall MD   25 mg at 05/08/24 0809    [START ON 5/9/2024] chlorthalidone tablet 25 mg  25 mg Oral Daily Raheem Crandall MD        docusate sodium (COLACE) capsule 100 mg  100 mg Oral BID Dev Onofre MD        heparin (porcine) subcutaneous injection 5,000 Units  5,000 Units Subcutaneous Q8H [facility] Dev Onofre MD   5,000 Units at 05/06/24 1514    hydrALAZINE (APRESOLINE) injection 10 mg  10 mg Intravenous Q6H PRN Raheem Crandall MD   10 mg at 05/08/24  "0320    hydrALAZINE (APRESOLINE) tablet 50 mg  50 mg Oral Q8H [facility] Raheem Crandall MD        ondansetron (ZOFRAN) injection 4 mg  4 mg Intravenous Q6H PRN Dev Onofre MD        senna (SENOKOT) tablet 8.6 mg  1 tablet Oral Daily Dev Onofre MD         Allergies   Allergen Reactions    Other      Other reaction(s): Unknown       Objective   Vitals: Blood pressure 170/72, pulse 91, temperature 98.1 °F (36.7 °C), resp. rate 14, height 5' 4\" (1.626 m), weight 74.8 kg (165 lb), last menstrual period 08/20/2020, SpO2 100%.  No intake or output data in the 24 hours ending 05/08/24 1132  Invasive Devices       Peripheral Intravenous Line  Duration             Peripheral IV 05/06/24 Right Antecubital 1 day                    Physical Exam    The history was obtained from the review of the chart, patient.    Lab Results:   Results from last 7 days   Lab Units 05/07/24 0443   HEMOGLOBIN A1C % 6.1*     Lab Results   Component Value Date    WBC 4.17 (L) 05/08/2024    HGB 12.8 05/08/2024    HCT 41.4 05/08/2024    MCV 74 (L) 05/08/2024     05/08/2024     Lab Results   Component Value Date/Time    BUN 16 05/08/2024 08:39 AM    BUN 7 08/11/2023 12:08 AM    K 4.0 05/08/2024 08:39 AM    K 3.7 08/11/2023 12:08 AM     05/08/2024 08:39 AM     08/11/2023 12:08 AM    CO2 27 05/08/2024 08:39 AM    CO2 21 08/11/2023 12:08 AM    CREATININE 0.72 05/08/2024 08:39 AM    CREATININE 0.67 08/11/2023 12:08 AM    AST 18 05/06/2024 12:19 PM    ALT 11 05/06/2024 12:19 PM    TP 8.9 (H) 05/06/2024 12:19 PM    ALB 4.5 05/06/2024 12:19 PM     Recent Labs     05/07/24  0443   HDL 41*   TRIG 108                Component  Ref Range & Units 5/7/24 0443 4/7/21 1258 8/17/20 1454 11/13/17 1547   TSH 3RD GENERATON  0.450 - 4.500 uIU/mL <0.010 Low  1.100 R, CM 1.320 R, CM 1.300             Component  Ref Range & Units 5/7/24 0443   Free T4  0.61 - 1.12 ng/dL 2.39 High         Imaging Studies: I have personally reviewed pertinent " reports.      Portions of the record may have been created with voice recognition software.

## 2024-05-08 NOTE — NURSING NOTE
Pts blood pressure was 202/111 this morning.  NURIS Valladares gave hydralazine.  Pts blood pressure after medication was 147/91.

## 2024-05-08 NOTE — ASSESSMENT & PLAN NOTE
Echo showed diastolic heart failure with normal EF  Elevated free T4 and decreased TSH  Appreciated endocrinology recs ordered thyroid immunoglobulins and antibodies

## 2024-05-08 NOTE — ASSESSMENT & PLAN NOTE
Elevated blood pressure to 257 systolic in ED  In association with elevated troponin   Did respond to IV push of labetalol   Resume home dose amlodipine in AM (took this morning)  Today's updated plan:  Patient has a family history of hypertension.  She reported that her blood pressure runs around 140 systolic  diastolic.  She also reported that she has whitecoat phenomena.  Patient denied any chest pain, headache, nausea, vomiting and diarrhea  Today's update and plan:  Started hydralazine 50 mg 3 times daily along with 25 mg of chlorthalidone, Coreg 25 mg twice daily and amlodipine 10 mg

## 2024-05-08 NOTE — CONSULTS
Consult Note - Vascular Surgery   [facility]: [phone]    Assessment:  60yo female with PMH of HTN, IVANIA. She presented to the ER on 5/6 due to hypertensive urgency (/120) while at PCP office. She denied any symptoms at that time. She was admitted for further work up. Renal duplex done 5/7 showed 60-99% right renal artery stenosis, therefore vascular surgery was consulted for recommendations.     Imaging-  Renal duplex 5/7: >60% right renal artery stenosis with turbulent flow distally. Patent renal vein. RRI 0.78. RAR 2.7. R kidney measures 9.5 cm; L kidney measures 10 cm. No significant disease noted in left renal artery.     Labs-  Creatinine 0.72/ GFR 92  WBC 4.17  Hgb 12.8  A1c 6.1%      Plan:  -No acute vascular intervention or further imaging indicated.   -Creatinine stable at 0.72, GFR 92.   -She is only on 1 anti-hypertensive at home (amlodipine). Currently on amlodipine, coreg, chlorthalidone, and hydralazine. BP 72/88 on exam but reports high anxiety.   -Recommend continued medical management of HTN with PCP. Would consider vascular intervention if evidence of worsening kidney function or BP is still uncontrolled on 4 or more BP medications at home.   -Recommend daily aspirin 81 mg and a statin medication for OMT of renal artery stenosis.   -Follow up with vascular surgery as outpatient for continued surveillance.   -Rest of care as per SLIM.   -D/w SLIM.   -D/w Dr. Moreno   ______________________________________________________________________    Consulting Service: SLIM    Chief Complaint: High blood pressure     HPI: Tayler Villalobos is a 59 y.o. female with PMH of HTN, anemia. She presented to the ER on 5/6 for hypertensive urgency while at PCP. She states her BP was 260/120 in their office. She denies any headaches, dizziness, CP, SOB, visual changes. She states she has a hx of HTN but has never been that high. She takes her BP occasionally at home and will get systolic  readings ranging from 120-160. She states she gets white coat syndrome at doctors offices and has anxiety. She states when she gets home her blood pressure significantly lowers. She has had issues with BP and anemia in the past; first started after her 4th pregnancy in her 40s. She has had blood transfusions for anemia, last transfusion was 2022 and has not required anymore since her hysterectomy. She denies any known cholesterol issues. She is not on any cholesterol medications.     She was on amlodipine at home for BP. She reports taking coreg in the past but was not taking last week.     She is a never smoker. Rarely drinks alcohol.     No prior vascular surgical history.     BP on exam 172/88.     Review of Systems:  General: negative  Cardiovascular: no chest pain or dyspnea on exertion  Respiratory: no cough, shortness of breath, or wheezing  Gastrointestinal: no abdominal pain, change in bowel habits, or black or bloody stools  Genitourinary ROS: no dysuria, trouble voiding, or hematuria  Musculoskeletal ROS: negative  Neurological ROS: no TIA or stroke symptoms  Hematological and Lymphatic ROS: negative  Dermatological ROS: negative  Psychological ROS: negative  Ophthalmic ROS: negative  ENT ROS: negative    Past Medical History:  Past Medical History:   Diagnosis Date    Hypertension     Iron deficiency anemia        Past Surgical History:  Past Surgical History:   Procedure Laterality Date    CATARACT EXTRACTION      left       Social History:  Social History     Substance and Sexual Activity   Alcohol Use Never     Social History     Substance and Sexual Activity   Drug Use Never     Social History     Tobacco Use   Smoking Status Never   Smokeless Tobacco Never   Tobacco Comments    non tobacco user       Family History:  Family History   Problem Relation Age of Onset    Hypertension Mother     Hypertension Father        Allergies:  Allergies   Allergen Reactions    Other      Other reaction(s): Unknown  "      Medications:  Current Facility-Administered Medications   Medication Dose Route Frequency    acetaminophen (TYLENOL) tablet 650 mg  650 mg Oral Q6H PRN    amLODIPine (NORVASC) tablet 10 mg  10 mg Oral Daily    atorvastatin (LIPITOR) tablet 20 mg  20 mg Oral Daily With Dinner    calcium carbonate (TUMS) chewable tablet 1,000 mg  1,000 mg Oral Daily PRN    carvedilol (COREG) tablet 25 mg  25 mg Oral BID With Meals    [START ON 5/9/2024] chlorthalidone tablet 25 mg  25 mg Oral Daily    docusate sodium (COLACE) capsule 100 mg  100 mg Oral BID    heparin (porcine) subcutaneous injection 5,000 Units  5,000 Units Subcutaneous Q8H FREDRICK    hydrALAZINE (APRESOLINE) injection 10 mg  10 mg Intravenous Q6H PRN    hydrALAZINE (APRESOLINE) tablet 50 mg  50 mg Oral Q8H FREDRICK    ondansetron (ZOFRAN) injection 4 mg  4 mg Intravenous Q6H PRN    senna (SENOKOT) tablet 8.6 mg  1 tablet Oral Daily       Vitals:  Vitals:    05/08/24 1529   BP: (!) 172/88   Pulse: 82   Resp:    Temp: 98 °F (36.7 °C)   SpO2: 99%       I/Os:  No intake/output data recorded.  No intake/output data recorded.    Lab Results and Cultures:   CBC with diff:   Lab Results   Component Value Date    WBC 4.17 (L) 05/08/2024    HGB 12.8 05/08/2024    HCT 41.4 05/08/2024    MCV 74 (L) 05/08/2024     05/08/2024    RBC 5.61 (H) 05/08/2024    MCH 22.8 (L) 05/08/2024    MCHC 30.9 (L) 05/08/2024    RDW 19.0 (H) 05/08/2024    MPV 11.1 08/02/2022    NRBC 0 05/06/2024   ,   BMP/CMP:  Lab Results   Component Value Date    K 4.0 05/08/2024    K 3.7 08/11/2023     05/08/2024     08/11/2023    CO2 27 05/08/2024    CO2 21 08/11/2023    BUN 16 05/08/2024    BUN 7 08/11/2023    CREATININE 0.72 05/08/2024    CREATININE 0.67 08/11/2023    CALCIUM 10.7 (H) 05/08/2024    CALCIUM 9.1 08/11/2023    AST 18 05/06/2024    ALT 11 05/06/2024    ALKPHOS 93 05/06/2024    EGFR 92 05/08/2024    EGFR 101 08/11/2023   ,   Lipid Panel: No results found for: \"CHOL\",   Coags:   Lab " "Results   Component Value Date    PTT 31 08/24/2020    INR 1.18 08/24/2020   ,     Blood Culture: No results found for: \"BLOODCX\",   Urinalysis:   Lab Results   Component Value Date    COLORU Yellow 08/01/2022    CLARITYU Slightly Cloudy 08/01/2022    SPECGRAV 1.025 08/01/2022    PHUR 6.0 08/01/2022    LEUKOCYTESUR Negative 08/01/2022    NITRITE Negative 08/01/2022    GLUCOSEU Negative 08/01/2022    KETONESU Negative 08/01/2022    BILIRUBINUR Negative 08/01/2022    BLOODU Negative 08/01/2022   ,   Urine Culture:   Lab Results   Component Value Date    URINECX No Growth <1000 cfu/mL 08/28/2020   ,   Wound Culure: No results found for: \"WOUNDCULT\"    Imaging:  VAS renal artery complete   Final Result by Marcell Rubin MD (05/08 0933)            Physical Exam:    General appearance: alert and oriented, in no acute distress  Head: Normocephalic, without obvious abnormality, atraumatic  Eyes: conjunctivae/corneas clear. PERRL, EOM's intact. Fundi benign.  Neck: no carotid bruit, no JVD, and supple, symmetrical, trachea midline  Lungs: clear to auscultation bilaterally  Heart: regular rate and rhythm, S1, S2 normal, no murmur, click, rub or gallop  Abdomen: soft, non-tender; bowel sounds normal; no masses,  no organomegaly  Extremities: extremities normal, warm and well-perfused; no cyanosis, clubbing, or edema  Skin: Skin color, texture, turgor normal. No rashes or lesions  Neurologic: Grossly normal      Pulse exam:  Radial: Right: 2+ Left:: 2+  DP: Right: 2+ Left: 2+  PT: Right: 2+ Left: 2+    Jovita Pimentel PA-C  5/8/2024      "

## 2024-05-08 NOTE — PLAN OF CARE
Problem: SAFETY ADULT  Goal: Maintains/Returns to pre admission functional level  Description: INTERVENTIONS:  - Perform AM-PAC 6 Click Basic Mobility/ Daily Activity assessment daily.  - Set and communicate daily mobility goal to care team and patient/family/caregiver.   - Collaborate with rehabilitation services on mobility goals if consulted    Outcome: Progressing     Problem: Knowledge Deficit  Goal: Patient/family/caregiver demonstrates understanding of disease process, treatment plan, medications, and discharge instructions  Description: Complete learning assessment and assess knowledge base.  Interventions:  - Provide teaching at level of understanding  - Provide teaching via preferred learning methods  Outcome: Progressing

## 2024-05-08 NOTE — PROGRESS NOTES
[facility]  Progress Note  Name: Tayler Villalobos I  MRN: [id number]  Unit/Bed#: -01 I Date of Admission: 5/6/2024   Date of Service: 5/8/2024 I Hospital Day: 2    Assessment/Plan   * Hypertensive urgency  Assessment & Plan  Elevated blood pressure to 257 systolic in ED  In association with elevated troponin   Did respond to IV push of labetalol   Resume home dose amlodipine in AM (took this morning)  Today's updated plan:  Patient has a family history of hypertension.  She reported that her blood pressure runs around 140 systolic  diastolic.  She also reported that she has whitecoat phenomena.  Patient denied any chest pain, headache, nausea, vomiting and diarrhea  Today's update and plan:  Started hydralazine 50 mg 3 times daily along with 25 mg of chlorthalidone, Coreg 25 mg twice daily and amlodipine 10 mg    Prediabetes  Assessment & Plan  Lifestyle modifications    Renal artery stenosis (HCC)  Assessment & Plan  Doppler ultrasound renal artery showed right renal artery more than 60% stenosis  Consult vascular surgery  Appreciated nephrology recommendations    Elevated serum free T4 level  Assessment & Plan  Echo showed diastolic heart failure with normal EF  Elevated free T4 and decreased TSH  Appreciated endocrinology recs ordered thyroid immunoglobulins and antibodies    Dyslipidemia  Assessment & Plan  Will start Lipitor 20 mg daily           VTE Pharmacologic Prophylaxis: VTE Score: 1 Moderate Risk (Score 3-4) - Pharmacological DVT Prophylaxis Ordered: heparin.    Mobility:   Basic Mobility Inpatient Raw Score: 24  JH-HLM Goal: 8: Walk 250 feet or more  JH-HLM Achieved: 7: Walk 25 feet or more  JH-HLM Goal achieved. Continue to encourage appropriate mobility.    Patient Centered Rounds: I performed bedside rounds with nursing staff today.   Discussions with Specialists or Other Care Team Provider: Nephrology    Education and Discussions with Family / Patient: Patient  declined call to .         Current Length of Stay: 2 day(s)  Current Patient Status: Inpatient   Certification Statement: The patient will continue to require additional inpatient hospital stay due to hypertension  Discharge Plan: Anticipate discharge in 24-48 hrs to home.    Code Status: Level 1 - Full Code    Subjective:   Patient was given 1 dose of hydralazine as her blood pressure went up last night.  She denied complaint of chest pain, shortness of breath    Objective:     Vitals:   Temp (24hrs), Av °F (36.7 °C), Min:97.9 °F (36.6 °C), Max:98.4 °F (36.9 °C)    Temp:  [97.9 °F (36.6 °C)-98.4 °F (36.9 °C)] 98.1 °F (36.7 °C)  HR:  [79-95] 91  Resp:  [14-18] 14  BP: (147-209)/() 170/72  SpO2:  [98 %-100 %] 100 %  Body mass index is 28.32 kg/m².     Input and Output Summary (last 24 hours):   No intake or output data in the 24 hours ending 24 1424    Physical Exam:   Constitutional: No acute distress  HEENT: Negative pallor or icterus  CVS: S1 plus S2  Respiratory: Normal vascular breathe without crackles and wheeze  Gastroenterology: Soft nontender without any palpable mass  Skin: No bruises or ecchymosis  Neurology: No focal logical deficit      Additional Data:     Labs:  Results from last 7 days   Lab Units 24  0839 24  0443 24  1216   WBC Thousand/uL 4.17*   < > 5.93   HEMOGLOBIN g/dL 12.8   < > 12.3   HEMATOCRIT % 41.4   < > 41.9   PLATELETS Thousands/uL 175   < > 140*   SEGS PCT %  --   --  69   LYMPHO PCT %  --   --  19   MONO PCT %  --   --  9   EOS PCT %  --   --  2    < > = values in this interval not displayed.     Results from last 7 days   Lab Units 24  0839 24  0443 24  1219   SODIUM mmol/L 137   < > 137   POTASSIUM mmol/L 4.0   < > 3.6   CHLORIDE mmol/L 103   < > 104   CO2 mmol/L 27   < > 23   BUN mg/dL 16   < > 9   CREATININE mg/dL 0.72   < > 0.56*   ANION GAP mmol/L 7   < > 10   CALCIUM mg/dL 10.7*   < > 10.1   ALBUMIN g/dL  --   --   4.5   TOTAL BILIRUBIN mg/dL  --   --  0.62   ALK PHOS U/L  --   --  93   ALT U/L  --   --  11   AST U/L  --   --  18   GLUCOSE RANDOM mg/dL 122   < > 103    < > = values in this interval not displayed.             Results from last 7 days   Lab Units 05/07/24  0443   HEMOGLOBIN A1C % 6.1*           Lines/Drains:  Invasive Devices       Peripheral Intravenous Line  Duration             Peripheral IV 05/06/24 Right Antecubital 2 days                          Imaging: Reviewed radiology reports from this admission including: ultrasound(s) Doppler showed right renal artery stenosis more than 60%    Recent Cultures (last 7 days):         Last 24 Hours Medication List:   Current Facility-Administered Medications   Medication Dose Route Frequency Provider Last Rate    acetaminophen  650 mg Oral Q6H PRN Dev Onofre MD      amLODIPine  10 mg Oral Daily Sully Karissa Wilhelm DO      atorvastatin  20 mg Oral Daily With Dinner Raheem Crandall MD      calcium carbonate  1,000 mg Oral Daily PRN Dve Onofre MD      carvedilol  25 mg Oral BID With Meals Raheem Crandall MD      [START ON 5/9/2024] chlorthalidone  25 mg Oral Daily Raheem Crandall MD      docusate sodium  100 mg Oral BID Dev Onofre MD      heparin (porcine)  5,000 Units Subcutaneous Q8H [facility] Dev Onofre MD      hydrALAZINE  10 mg Intravenous Q6H PRN Raheem Crandall MD      hydrALAZINE  50 mg Oral Q8H [facility] Raheem Crandall MD      ondansetron  4 mg Intravenous Q6H PRN Dev Onofre MD      senna  1 tablet Oral Daily Dev Onofre MD          Today, Patient Was Seen By: Raheem Crandall MD    **Please Note: This note may have been constructed using a voice recognition system.**

## 2024-05-08 NOTE — CONSULTS
NEPHROLOGY CONSULTATION NOTE    Patient: Tayler Villalobos               Sex: female          DOA: 5/6/2024 11:20 AM   YOB: 1965         Age: 59 y.o.         LOS:  LOS: 2 days   Encounter Date: 5/8/2024    REFERRING PHYSICIAN: Raheem Crandall MD     REASON FOR THE REFERRAL / CONSULTATION: Further management of hypertensive crisis    DATE OF CONSULTATION / SERVICE: 5/8/2024    ADMISSION DIAGNOSIS: Hypertensive urgency     Chief Complaint   Patient presents with    Hypertension     Pt presents from PCP for high blood pressure, pt states every time she goes to the office her BP goes up. Denies CP/SOB.         HPI     This is a 59-year-old female with past medical history significant for iron deficiency, hypertension, admitted for further management of high blood pressure.  Nephrology were consulted for further management of hypertensive crisis.    I have seen patient earlier at bedside and according to patient she has been taking antihypertensive medications for more than 5 years.  Patient also checks blood pressure on regular basis at home and told me that her SBP is mostly running more than 140.  Patient was taking amlodipine and Coreg at home and has been compliant with her antihypertensive medications.  Patient's admission blood pressure was 256/119 and now patient is requiring 4 antihypertensive medication along with as needed IV hydralazine.  Underwent renal artery Doppler earlier today and also found to have more than 60% stenosis in proximal main renal artery with turbulent flow distally.     Currently patient denies nausea, vomiting, headache, dizziness, abdominal pain, constipation or rash.    PAST MEDICAL HISTORY     Past Medical History:   Diagnosis Date    Hypertension     Iron deficiency anemia        PAST SURGICAL HISTORY     Past Surgical History:   Procedure Laterality Date    CATARACT EXTRACTION      left       ALLERGIES     Allergies   Allergen Reactions    Other      Other reaction(s):  Unknown       SOCIAL HISTORY     Social History     Substance and Sexual Activity   Alcohol Use Never     Social History     Substance and Sexual Activity   Drug Use Never     Social History     Tobacco Use   Smoking Status Never   Smokeless Tobacco Never   Tobacco Comments    non tobacco user       FAMILY HISTORY     Family History   Problem Relation Age of Onset    Hypertension Mother     Hypertension Father        CURRENT MEDICATIONS       Current Facility-Administered Medications:     acetaminophen (TYLENOL) tablet 650 mg, 650 mg, Oral, Q6H PRN, Dev Onofre MD    amLODIPine (NORVASC) tablet 10 mg, 10 mg, Oral, Daily, Sully Wilhelm DO, 10 mg at 05/08/24 0809    calcium carbonate (TUMS) chewable tablet 1,000 mg, 1,000 mg, Oral, Daily PRN, Dev Onofre MD    carvedilol (COREG) tablet 25 mg, 25 mg, Oral, BID With Meals, Raheem Crandall MD, 25 mg at 05/08/24 0809    [START ON 5/9/2024] chlorthalidone tablet 25 mg, 25 mg, Oral, Daily, Raheem Crandall MD    docusate sodium (COLACE) capsule 100 mg, 100 mg, Oral, BID, Dev Onofre MD    heparin (porcine) subcutaneous injection 5,000 Units, 5,000 Units, Subcutaneous, Q8H [facility], Dev Onofre MD, 5,000 Units at 05/06/24 1514    hydrALAZINE (APRESOLINE) injection 10 mg, 10 mg, Intravenous, Q6H PRN, Raheem Crandall MD, 10 mg at 05/08/24 0320    hydrALAZINE (APRESOLINE) tablet 50 mg, 50 mg, Oral, Q8H [facility], Raheem Crandall MD    ondansetron (ZOFRAN) injection 4 mg, 4 mg, Intravenous, Q6H PRN, Dev Onofre MD    senna (SENOKOT) tablet 8.6 mg, 1 tablet, Oral, Daily, Dev Onofre MD    REVIEW OF SYSTEMS     Complete 10 points of review of systems were obtained and discussed in length with patient today.  Complete 10 points of review of systems were negative/unremarkable except mentioned in the HPI section.      OBJECTIVE     Current Weight: Weight - Scale: 74.8 kg (165 lb)  /72 (BP Location: Left leg)   Pulse 91   Temp 98.1 °F (36.7 °C)  "  Resp 14   Ht 5' 4\" (1.626 m)   Wt 74.8 kg (165 lb)   LMP 08/20/2020   SpO2 100%   BMI 28.32 kg/m²   Vitals:    05/08/24 0955   BP: 170/72   Pulse:    Resp:    Temp:    SpO2:      Body mass index is 28.32 kg/m².  No intake or output data in the 24 hours ending 05/08/24 1038    PHYSICAL EXAMINATION     Physical Exam  Constitutional:       General: She is not in acute distress.  HENT:      Right Ear: External ear normal.   Eyes:      Conjunctiva/sclera:      Right eye: No hemorrhage.  Neck:      Thyroid: No thyromegaly.   Pulmonary:      Effort: No accessory muscle usage or respiratory distress.   Abdominal:      General: There is no distension.   Skin:     Coloration: Skin is not jaundiced.   Psychiatric:         Behavior: Behavior is not combative.           LAB RESULTS        Results from last 7 days   Lab Units 05/08/24  0839 05/07/24  0443 05/06/24  1219 05/06/24  1216   WBC Thousand/uL 4.17* 4.33  --  5.93   HEMOGLOBIN g/dL 12.8 11.4*  --  12.3   HEMATOCRIT % 41.4 37.9  --  41.9   PLATELETS Thousands/uL 175 152  --  140*   SODIUM mmol/L 137 138 137  --    POTASSIUM mmol/L 4.0 3.7 3.6  --    CHLORIDE mmol/L 103 104 104  --    CO2 mmol/L 27 29 23  --    BUN mg/dL 16 12 9  --    CREATININE mg/dL 0.72 0.62 0.56*  --    EGFR ml/min/1.73sq m 92 99 103  --    CALCIUM mg/dL 10.7* 10.0 10.1  --    MAGNESIUM mg/dL  --  2.2  --   --    PHOSPHORUS mg/dL 4.7*  --   --   --        RADIOLOGY RESULTS       VAS renal artery complete   Final Result by Marcell Rubin MD (05/08 0933)          PLAN / RECOMMENDATIONS      1.  Hypertensive crisis.  Concerning for underlying renal artery stenosis.    Patient has underlying hypertension and been taking antihypertensive medication for more than 5 years and according to patient she has been compliant with antihypertensive medication and also follows low-salt diet.  According to patient she also check blood pressure regular basis at home and her SBP is normally runs higher than " "140.  Patient's admission blood pressure was 256/119 and now patient is on amlodipine 10 mg p.o. daily, Coreg 25 mg p.o. twice daily, chlorthalidone 12.5 mg p.o. daily and hydralazine 50 mg p.o. twice daily and blood pressure still running on the higher side.  Underwent renal artery Doppler earlier and found to have more than 60% stenosis in proximal main renal artery with turbulent flow distally.    Since blood pressure is currently not ideal despite of 4 antihypertensive medications, will recommend vascular surgery consultation for possible stenting versus revascularization surgery for renal artery stenosis.  If patient needs CTA renal artery per vascular surgery recommendation, recommend normal saline at 100 mL/h x 10-hour    For time being monitor hypertension on current regimen with as needed IV hydralazine.    2.  Hypercalcemia.  Current calcium is 10.7 and suspect may be due to use of chlorthalidone.  For time being continue chlorthalidone for management of hypertension.  Encourage patient to increase oral fluid intake.  Monitor calcium level today.    Thank you for the consultation to participate in patient's care. I have personally discussed my overall above mentioned plan and recommendation with the referring physician and they agreed with my plan of consulting vascular surgery for further evaluation    Rod Razo MD  Nephrology  5/8/2024        Portions of the record may have been created with voice recognition software. Occasional wrong word or \"sound a like\" substitutions may have occurred due to the inherent limitations of voice recognition software. Read the chart carefully and recognize, using context, where substitutions have occurred.    "

## 2024-05-08 NOTE — UTILIZATION REVIEW
Continued Stay Review    Date: 5/8                          Current Patient Class: IP   Current Level of Care: MS    HPI:59 y.o. female initially admitted on  5/6 for HTN urgency     Assessment/Plan:     5/8 Nephrology Consult   Hypertensive crisis. Concerning for underlying renal artery stenosis . Admission /119 . on amlodipine 10 mg p.o. daily, Coreg 25 mg p.o. twice daily, chlorthalidone 12.5 mg p.o. daily and hydralazine 50 mg p.o. twice daily and blood pressure still running on the higher side. Underwent renal artery Doppler earlier and found to have more than 60% stenosis in proximal main renal artery with turbulent flow distally.  Rec  hr x 10 hrs . Current calcium 10.7 may be due to use of chlorthalidone.     5/8 Endocrinology Note   Differential includes Graves' disease, thyroiditis, or toxic thyroid nodules. will check TSI as well as TPO and thyroglobulin antibodies. If TSI is positive she should be started on methimazole and if not would suggest repeating thyroid function test in the next 3 to 4 weeks     Vital Signs:   05/08/24 0955 -- -- -- 170/72 -- -- -- Lying   05/08/24 0814 -- -- -- -- -- -- None (Room air) --   05/08/24 08:09:53 -- 91 -- 183/104 Abnormal  130 100 % -- --   05/08/24 0809 -- 91 -- 183/104 Abnormal  -- -- -- --   05/08/24 06:59:27 98.1 °F (36.7 °C) 95 14 155/90 112 98 % -- --   05/08/24 03:57:54 -- 94 -- 147/91 110 98 % -- --   05/08/24 03:16:09 98 °F (36.7 °C) 91 17 202/111 Abnormal  141 100 % -- Lying       Pertinent Labs/Diagnostic Results:   5/7 Echo Left Ventricle: Left ventricular cavity size is normal. Wall thickness is increased. There is moderate to severe concentric hypertrophy. The left ventricular ejection fraction is 60%. Systolic function is normal. Wall motion is normal. Diastolic function is mildly abnormal, consistent with grade I (abnormal) relaxation.     5/7 Renal artery     RIGHT RENAL:  Evidence of a >60% stenosis in the proximal main renal artery  with turbulent  flow distally. Patent renal vein.  Adequate parenchymal flow is noted with a renovascular resistive index of 0.78.  Renal/Aorta Ratio: 2.7 . The kidney measures approx 9.5 cm   LEFT RENAL:  No evidence of significant arterial occlusive disease in the main renal artery.  Patent renal vein.  Adequate parenchymal flow is noted with a renovascular resistive index of 0.78.  Renal/Aorta Ratio: 0.92 .  The kidney measures approximately 10 cm.  Results from last 7 days   Lab Units 05/08/24  0839 05/07/24 0443 05/06/24  1216   WBC Thousand/uL 4.17* 4.33 5.93   HEMOGLOBIN g/dL 12.8 11.4* 12.3   HEMATOCRIT % 41.4 37.9 41.9   PLATELETS Thousands/uL 175 152 140*   TOTAL NEUT ABS Thousands/µL  --   --  4.11         Results from last 7 days   Lab Units 05/08/24  0839 05/07/24 0443 05/06/24  1219   SODIUM mmol/L 137 138 137   POTASSIUM mmol/L 4.0 3.7 3.6   CHLORIDE mmol/L 103 104 104   CO2 mmol/L 27 29 23   ANION GAP mmol/L 7 5 10   BUN mg/dL 16 12 9   CREATININE mg/dL 0.72 0.62 0.56*   EGFR ml/min/1.73sq m 92 99 103   CALCIUM mg/dL 10.7* 10.0 10.1   MAGNESIUM mg/dL  --  2.2  --    PHOSPHORUS mg/dL 4.7*  --   --      Results from last 7 days   Lab Units 05/06/24  1219   AST U/L 18   ALT U/L 11   ALK PHOS U/L 93   TOTAL PROTEIN g/dL 8.9*   ALBUMIN g/dL 4.5   TOTAL BILIRUBIN mg/dL 0.62         Results from last 7 days   Lab Units 05/08/24  0839 05/07/24 0443 05/06/24  1219   GLUCOSE RANDOM mg/dL 122 100 103         Results from last 7 days   Lab Units 05/07/24  0443   HEMOGLOBIN A1C % 6.1*   EAG mg/dl 128       Results from last 7 days   Lab Units 05/06/24  1744 05/06/24  1426 05/06/24  1219   HS TNI 0HR ng/L  --   --  120*   HS TNI 2HR ng/L  --  104*  --    HSTNI D2 ng/L  --  -16  --    HS TNI 4HR ng/L 145*  --   --    HSTNI D4 ng/L 25*  --   --      Results from last 7 days   Lab Units 05/07/24  0443   TSH 3RD GENERATON uIU/mL <0.010*           Medications:   Scheduled Medications:  amLODIPine, 10 mg, Oral,  Daily  carvedilol, 25 mg, Oral, BID With Meals  [START ON 5/9/2024] chlorthalidone, 25 mg, Oral, Daily  docusate sodium, 100 mg, Oral, BID  heparin (porcine), 5,000 Units, Subcutaneous, Q8H [facility]  hydrALAZINE, 50 mg, Oral, Q8H [facility]  senna, 1 tablet, Oral, Daily      Continuous IV Infusions:     PRN Meds:  acetaminophen, 650 mg, Oral, Q6H PRN  calcium carbonate, 1,000 mg, Oral, Daily PRN  hydrALAZINE, 10 mg, Intravenous, Q6H PRN  5/7 x1  5/8 x1  ondansetron, 4 mg, Intravenous, Q6H PRN        Discharge Plan: TBD     Network Utilization Review Department  ATTENTION: Please call with any questions or concerns to [phone] and carefully listen to the prompts so that you are directed to the right person. All voicemails are confidential.   For Discharge needs, contact Care Management DC Support Team at [phone] opt. [phone]  Send all requests for admission clinical reviews, approved or denied determinations and any other requests to dedicated fax number below belonging to the campus where the patient is receiving treatment. List of dedicated fax numbers for the Facilities:  FACILITY NAME UR FAX NUMBER   ADMISSION DENIALS (Administrative/Medical Necessity) [phone]   DISCHARGE SUPPORT TEAM (NETWORK) [phone]   PARENT CHILD HEALTH (Maternity/NICU/Pediatrics) [phone]   [facility] [phone]   [facility] [phone]   [facility] [phone]   [facility] [phone]   [facility] [phone]   [facility] [phone]   [facility] [phone]   [facility] [phone]   [facility] [phone]   [facility] [phone]   [facility] [phone]   [facility] [phone]

## 2024-05-08 NOTE — ASSESSMENT & PLAN NOTE
Doppler ultrasound renal artery showed right renal artery more than 60% stenosis  Consult vascular surgery  Appreciated nephrology recommendations

## 2024-05-09 ENCOUNTER — TELEPHONE (OUTPATIENT)
Dept: VASCULAR SURGERY | Facility: CLINIC | Age: 59
End: 2024-05-09

## 2024-05-09 LAB
THYROGLOB AB SERPL-ACNC: 3.1 IU/ML (ref 0–0.9)
THYROPEROXIDASE AB SERPL-ACNC: 319 IU/ML (ref 0–34)

## 2024-05-09 PROCEDURE — 99232 SBSQ HOSP IP/OBS MODERATE 35: CPT | Performed by: INTERNAL MEDICINE

## 2024-05-09 PROCEDURE — 99233 SBSQ HOSP IP/OBS HIGH 50: CPT | Performed by: STUDENT IN AN ORGANIZED HEALTH CARE EDUCATION/TRAINING PROGRAM

## 2024-05-09 RX ORDER — ALPRAZOLAM 0.25 MG/1
0.25 TABLET ORAL 2 TIMES DAILY PRN
Status: DISCONTINUED | OUTPATIENT
Start: 2024-05-09 | End: 2024-05-10 | Stop reason: HOSPADM

## 2024-05-09 RX ORDER — ALPRAZOLAM 0.25 MG/1
0.25 TABLET ORAL
Status: DISCONTINUED | OUTPATIENT
Start: 2024-05-09 | End: 2024-05-09

## 2024-05-09 RX ORDER — HYDRALAZINE HYDROCHLORIDE 25 MG/1
100 TABLET, FILM COATED ORAL EVERY 8 HOURS SCHEDULED
Status: DISCONTINUED | OUTPATIENT
Start: 2024-05-09 | End: 2024-05-10 | Stop reason: HOSPADM

## 2024-05-09 RX ADMIN — HYDRALAZINE HYDROCHLORIDE 100 MG: 25 TABLET ORAL at 21:51

## 2024-05-09 RX ADMIN — HYDRALAZINE HYDROCHLORIDE 100 MG: 25 TABLET ORAL at 15:02

## 2024-05-09 RX ADMIN — AMLODIPINE BESYLATE 10 MG: 10 TABLET ORAL at 09:45

## 2024-05-09 RX ADMIN — HYDRALAZINE HYDROCHLORIDE 100 MG: 25 TABLET ORAL at 09:45

## 2024-05-09 RX ADMIN — CARVEDILOL 25 MG: 12.5 TABLET, FILM COATED ORAL at 09:45

## 2024-05-09 RX ADMIN — CHLORTHALIDONE 25 MG: 25 TABLET ORAL at 09:45

## 2024-05-09 RX ADMIN — CARVEDILOL 25 MG: 12.5 TABLET, FILM COATED ORAL at 16:35

## 2024-05-09 RX ADMIN — ATORVASTATIN CALCIUM 20 MG: 20 TABLET, FILM COATED ORAL at 16:35

## 2024-05-09 RX ADMIN — HYDRALAZINE HYDROCHLORIDE 50 MG: 25 TABLET ORAL at 05:55

## 2024-05-09 RX ADMIN — ALPRAZOLAM 0.25 MG: 0.25 TABLET ORAL at 09:51

## 2024-05-09 NOTE — CASE MANAGEMENT
Case Management Assessment & Discharge Planning Note    Patient name Tayler Villalobos  Location /-01 MRN [id number]  : 1965 Date 2024       Current Admission Date: 2024  Current Admission Diagnosis:Hypertensive urgency   Patient Active Problem List    Diagnosis Date Noted    Renal artery stenosis (HCC) 2024    Prediabetes 2024    Dyslipidemia 2024    Hypertensive urgency 2024    Elevated serum free T4 level 2024    Uterine myoma 2022    Vaginal bleeding 2022    Adenomyomatosis of gallbladder 2022    Iron deficiency anemia 2021    Overweight 2021    White coat syndrome with diagnosis of hypertension 2017    Acute blood loss anemia 2017    Cataract 2017    Benign essential hypertension 2017      LOS (days): 3  Geometric Mean LOS (GMLOS) (days):   Days to GMLOS:     OBJECTIVE:    Risk of Unplanned Readmission Score: 9.94         Current admission status: Inpatient       Preferred Pharmacy:   [id number] - [place], [place] - [address]  [address]  [place] [address]  Phone: [phone] Fax: [phone]    Primary Care Provider: No primary care provider on file.    Primary Insurance: [employer]  Secondary Insurance:     ASSESSMENT:  Active Health Care Proxies    There are no active Health Care Proxies on file.       Advance Directives  Does patient have a Health Care POA?: No  Was patient offered paperwork?: Yes (declined)  Does patient currently have a Health Care decision maker?: Yes, please see Health Care Proxy section  Does patient have Advance Directives?: No  Was patient offered paperwork?: Yes (declined)  Primary Contact: eri cormier         Readmission Root Cause  30 Day Readmission: No    Patient Information  Admitted from:: Home  Mental Status: Alert  During Assessment patient was accompanied by: Not accompanied during assessment  Assessment information provided by::  Patient  Primary Caregiver: Self  Support Systems: Family members, Daughter  County of Residence: [place]  What city do you live in[place]  Home entry access options. Select all that apply.: Stairs  Number of steps to enter home.: 4  Type of Current Residence: 2 story home  Upon entering residence, is there a bedroom on the main floor (no further steps)?: No  A bedroom is located on the following floor levels of residence (select all that apply):: 2nd Floor  Upon entering residence, is there a bathroom on the main floor (no further steps)?: Yes  Number of steps to 2nd floor from main floor: One Flight  Living Arrangements: Lives w/ Daughter  Is patient a ?: No    Activities of Daily Living Prior to Admission  Functional Status: Independent  Completes ADLs independently?: Yes  Ambulates independently?: Yes  Does patient use assisted devices?: No  Does patient currently own DME?: No  Does patient have a history of Outpatient Therapy (PT/OT)?: No  Does the patient have a history of Short-Term Rehab?: No  Does patient have a history of HHC?: No  Does patient currently have HHC?: No         Patient Information Continued  Income Source: Pension/assisted  Does patient have prescription coverage?: Yes  Does patient receive dialysis treatments?: No  Does patient have a history of substance abuse?: No  Does patient have a history of Mental Health Diagnosis?: No    PHQ 2/9 Screening   Reviewed PHQ 2/9 Depression Screening Score?: No    Means of Transportation  Means of Transport to Appts:: Drives Self      Social Determinants of Health (SDOH)      Flowsheet Row Most Recent Value   Housing Stability    In the last 12 months, was there a time when you were not able to pay the mortgage or rent on time? N   In the last 12 months, was there a time when you did not have a steady place to sleep or slept in a shelter (including now)? N   Transportation Needs    In the past 12 months, has lack of transportation kept you from  medical appointments or from getting medications? no   In the past 12 months, has lack of transportation kept you from meetings, work, or from getting things needed for daily living? No   Food Insecurity    Within the past 12 months, you worried that your food would run out before you got the money to buy more. Never true   Within the past 12 months, the food you bought just didn't last and you didn't have money to get more. Never true   Utilities    In the past 12 months has the electric, gas, oil, or water company threatened to shut off services in your home? No            DISCHARGE DETAILS:    Discharge planning discussed with:: Patient at bedside  Freedom of Choice: Yes  Comments - Freedom of Choice: CM discussed freedom of choice as it pertains to discharge planning. Patient declined having any needs.  CM contacted family/caregiver?: No- see comments (A/O)  Were Treatment Team discharge recommendations reviewed with patient/caregiver?: Yes  Did patient/caregiver verbalize understanding of patient care needs?: Yes  Were patient/caregiver advised of the risks associated with not following Treatment Team discharge recommendations?: Yes         Requested Home Health Care         Is the patient interested in HHC at discharge?: No    DME Referral Provided  Referral made for DME?: No    Other Referral/Resources/Interventions Provided:  Interventions: None Indicated    Would you like to participate in our [facility] service program?  : No - Declined    Treatment Team Recommendation: Home  Discharge Destination Plan:: Home  Transport at Discharge : Family

## 2024-05-09 NOTE — PLAN OF CARE
Problem: SAFETY ADULT  Goal: Patient will remain free of falls  Description: INTERVENTIONS:  - Educate patient/family on patient safety including physical limitations  - Instruct patient to call for assistance with activity   - Consult OT/PT to assist with strengthening/mobility   - Keep Call bell within reach  - Keep bed low and locked with side rails adjusted as appropriate  - Keep care items and personal belongings within reach  - Initiate and maintain comfort rounds  - Make Fall Risk Sign visible to staff  - Apply yellow socks and bracelet for high fall risk patients  - Consider moving patient to room near nurses station  Outcome: Progressing     Problem: DISCHARGE PLANNING  Goal: Discharge to home or other facility with appropriate resources  Description: INTERVENTIONS:  - Identify barriers to discharge w/patient and caregiver  - Arrange for needed discharge resources and transportation as appropriate  - Identify discharge learning needs (meds, wound care, etc.)  - Arrange for interpretive services to assist at discharge as needed  - Refer to Case Management Department for coordinating discharge planning if the patient needs post-hospital services based on physician/advanced practitioner order or complex needs related to functional status, cognitive ability, or social support system  Outcome: Progressing     Problem: Knowledge Deficit  Goal: Patient/family/caregiver demonstrates understanding of disease process, treatment plan, medications, and discharge instructions  Description: Complete learning assessment and assess knowledge base.  Interventions:  - Provide teaching at level of understanding  - Provide teaching via preferred learning methods  Outcome: Progressing

## 2024-05-09 NOTE — ASSESSMENT & PLAN NOTE
Elevated blood pressure to 257 systolic in ED  In association with elevated troponin   Did respond to IV push of labetalol   Resume home dose amlodipine in AM (took this morning)  Today's updated plan:  Patient has a family history of hypertension.  She reported that her blood pressure runs around 140 systolic  diastolic.  She also reported that she has whitecoat phenomena.  Patient denied any chest pain, headache, nausea, vomiting and diarrhea  Today's update and plan:  Increase dose of chlorthalidone, hydralazine 100 mg 3 times daily, Coreg 25 mg twice daily and amlodipine 10 mg  First time patient's blood pressure is less than 140/90.  Appreciated nephrology recommended lisinopril son asked show eyes if patient blood pressures not optimal  Started Xanax twice daily as needed as patient has underlying anxiety regarding her high blood pressure.

## 2024-05-09 NOTE — PROGRESS NOTES
NEPHROLOGY PROGRESS NOTE    Patient: Tayler Villalobos               Sex: female          DOA: 5/6/2024 11:20 AM   YOB: 1965        Age: 59 y.o.         LOS:  LOS: 3 days   Encounter Date: 5/9/2024    REASON FOR THE CONSULTATION: Further management of hypertensive crisis    HPI     This is a 59 y.o. female admitted for Hypertensive urgency     SUBJECTIVE     -Seen by vascular surgery yesterday and reviewed their note with recommendations.  Patient's breathing is currently stable.  Patient denies nausea, vomiting, headache or dizziness today.  - Reviewed last 24 hrs events     CURRENT MEDICATIONS       Current Facility-Administered Medications:     acetaminophen (TYLENOL) tablet 650 mg, 650 mg, Oral, Q6H PRN, Dev Onofre MD    ALPRAZolam (XANAX) tablet 0.25 mg, 0.25 mg, Oral, BID PRN, Raheem Crandall MD, 0.25 mg at 05/09/24 0951    amLODIPine (NORVASC) tablet 10 mg, 10 mg, Oral, Daily, Sully Wilhelm DO, 10 mg at 05/09/24 0945    atorvastatin (LIPITOR) tablet 20 mg, 20 mg, Oral, Daily With Dinner, Raheem Crandall MD, 20 mg at 05/08/24 1716    calcium carbonate (TUMS) chewable tablet 1,000 mg, 1,000 mg, Oral, Daily PRN, Dev Onofre MD    carvedilol (COREG) tablet 25 mg, 25 mg, Oral, BID With Meals, Raheem Crandall MD, 25 mg at 05/09/24 0945    chlorthalidone tablet 25 mg, 25 mg, Oral, Daily, Raheem Crandall MD, 25 mg at 05/09/24 0945    docusate sodium (COLACE) capsule 100 mg, 100 mg, Oral, BID, Dev Onofre MD    heparin (porcine) subcutaneous injection 5,000 Units, 5,000 Units, Subcutaneous, Q8H FREDRICK, Dev Onofre MD, 5,000 Units at 05/08/24 1456    hydrALAZINE (APRESOLINE) injection 10 mg, 10 mg, Intravenous, Q6H PRN, Raheem Crandall MD, 10 mg at 05/08/24 0320    hydrALAZINE (APRESOLINE) tablet 100 mg, 100 mg, Oral, Q8H FREDRICK, Raheem Crandall MD, 100 mg at 05/09/24 0945    ondansetron (ZOFRAN) injection 4 mg, 4 mg, Intravenous, Q6H PRN, MD amish Yungna (SENOKOT) tablet  "8.6 mg, 1 tablet, Oral, Daily, Dev Alis Onofre MD    OBJECTIVE     Current Weight: Weight - Scale: 74.8 kg (165 lb)  /66   Pulse 89   Temp 98.1 °F (36.7 °C)   Resp 16   Ht 5' 4\" (1.626 m)   Wt 74.8 kg (165 lb)   LMP 08/20/2020   SpO2 98%   BMI 28.32 kg/m²   Vitals:    05/09/24 1154   BP: 132/66   Pulse: 89   Resp:    Temp:    SpO2: 98%     Body mass index is 28.32 kg/m².    Intake/Output Summary (Last 24 hours) at 5/9/2024 1203  Last data filed at 5/8/2024 1735  Gross per 24 hour   Intake 240 ml   Output --   Net 240 ml       PHYSICAL EXAMINATION     Physical Exam  Constitutional:       General: She is not in acute distress.  HENT:      Right Ear: External ear normal.   Eyes:      Conjunctiva/sclera:      Right eye: No hemorrhage.  Neck:      Thyroid: No thyromegaly.   Pulmonary:      Effort: No accessory muscle usage or respiratory distress.   Abdominal:      General: There is no distension.   Skin:     Coloration: Skin is not jaundiced.   Psychiatric:         Behavior: Behavior is not combative.           LAB RESULTS     Results from last 7 days   Lab Units 05/08/24  0839 05/07/24  0443 05/06/24  1219 05/06/24  1216   WBC Thousand/uL 4.17* 4.33  --  5.93   HEMOGLOBIN g/dL 12.8 11.4*  --  12.3   HEMATOCRIT % 41.4 37.9  --  41.9   PLATELETS Thousands/uL 175 152  --  140*   SODIUM mmol/L 137 138 137  --    POTASSIUM mmol/L 4.0 3.7 3.6  --    CHLORIDE mmol/L 103 104 104  --    CO2 mmol/L 27 29 23  --    BUN mg/dL 16 12 9  --    CREATININE mg/dL 0.72 0.62 0.56*  --    EGFR ml/min/1.73sq m 92 99 103  --    CALCIUM mg/dL 10.7* 10.0 10.1  --    MAGNESIUM mg/dL  --  2.2  --   --    PHOSPHORUS mg/dL 4.7*  --   --   --        RADIOLOGY RESULTS      VAS renal artery complete   Final Result by Marcell Rubin MD (05/08 0933)          PLAN / RECOMMENDATIONS      1.  Hypertensive crisis.  Concerning for underlying renal artery stenosis    Patient had underwent renal artery Doppler earlier and found to have " "more than 60% stenosis in proximal main renal artery with turbulent flow distally.  Vascular surgery were consulted and according to their note, no plan for intervention yet unless renal function worsen or need more than 4 antihypertensive medications.    Overnight patient's blood pressure has remained uncontrolled and chlorthalidone was increased to 25 mg and hydralazine was increased to 100 mg p.o. 3 times daily on top of Coreg 25 mg p.o. twice daily and amlodipine 10 mg p.o. daily with as needed IV hydralazine.  If blood pressure remains above the goal, recommend initiation of ACE inhibitor/ARB as a next choice.    2.  Hypercalcemia.  Calcium level from yesterday was 10.7 and was suspected due to use of chlorthalidone.  Will plan to check BMP tomorrow to reassess calcium level.  Encourage patient to increase oral fluid intake    Overall above mentioned plan and recommendations were also d/w current [facility] physician and they agreed with my plan of monitoring hypertension today    Rod Razo MD  Nephrology  5/9/2024        Portions of the record may have been created with voice recognition software. Occasional wrong word or \"sound a like\" substitutions may have occurred due to the inherent limitations of voice recognition software. Read the chart carefully and recognize, using context, where substitutions have occurred.  "

## 2024-05-09 NOTE — ASSESSMENT & PLAN NOTE
Echo showed diastolic heart failure with normal EF  Elevated free T4 and decreased TSH  Today's update and plan:  Antimicrosomal antibodies and antithyroglobulin are positive and elevated.  Reached out to endocrinology recommended to wait till TSI is if it is positive then we will start low-dose of methimazole

## 2024-05-09 NOTE — UTILIZATION REVIEW
ADDITIONAL CLINICAL   5/8/24    Vascular consult  No acute vascular intervention or further   imaging  indicated.  May consider  vascular  intervention if any evidence of worsening  kidney function  or  BP  uncontrolled.  F/U  OP.       On  hydralazine,  chlorthalidone, coreg  and amlodipine.

## 2024-05-09 NOTE — PROGRESS NOTES
[facility], [facility]–1983  Progress Note  Name: Tayler Villalobos I  MRN: [id number]  Unit/Bed#: -01 I Date of Admission: 5/6/2024   Date of Service: 5/9/2024 I Hospital Day: 3    Assessment/Plan   * Hypertensive urgency  Assessment & Plan  Elevated blood pressure to 257 systolic in ED  In association with elevated troponin   Did respond to IV push of labetalol   Resume home dose amlodipine in AM (took this morning)  Today's updated plan:  Patient has a family history of hypertension.  She reported that her blood pressure runs around 140 systolic  diastolic.  She also reported that she has whitecoat phenomena.  Patient denied any chest pain, headache, nausea, vomiting and diarrhea  Today's update and plan:  Increase dose of chlorthalidone, hydralazine 100 mg 3 times daily, Coreg 25 mg twice daily and amlodipine 10 mg  First time patient's blood pressure is less than 140/90.  Appreciated nephrology recommended lisinopril son asked show eyes if patient blood pressures not optimal  Started Xanax twice daily as needed as patient has underlying anxiety regarding her high blood pressure.    Prediabetes  Assessment & Plan  Lifestyle modifications    Renal artery stenosis (HCC)  Assessment & Plan  Doppler ultrasound renal artery showed right renal artery more than 60% stenosis  Consult vascular surgery  Appreciated nephrology recommendations    Elevated serum free T4 level  Assessment & Plan  Echo showed diastolic heart failure with normal EF  Elevated free T4 and decreased TSH  Today's update and plan:  Antimicrosomal antibodies and antithyroglobulin are positive and elevated.  Reached out to endocrinology recommended to wait till TSI is if it is positive then we will start low-dose of methimazole    Dyslipidemia  Assessment & Plan  Continue with Lipitor 20 mg             VTE Pharmacologic Prophylaxis: VTE Score: 1 Moderate Risk (Score 3-4) - Pharmacological DVT Prophylaxis Ordered: heparin.    Mobility:    Basic Mobility Inpatient Raw Score: 24  JH-HLM Goal: 8: Walk 250 feet or more  JH-HLM Achieved: 8: Walk 250 feet ot more  JH-HLM Goal achieved. Continue to encourage appropriate mobility.    Patient Centered Rounds: I performed bedside rounds with nursing staff today.   Discussions with Specialists or Other Care Team Provider: Nephrology and vascular surgery    Education and Discussions with Family / Patient: Patient declined call to .       Current Length of Stay: 3 day(s)  Current Patient Status: Inpatient   Certification Statement: The patient will continue to require additional inpatient hospital stay due to persistent high blood pressure  Discharge Plan: Anticipate discharge in 24-48 hrs to home.    Code Status: Level 1 - Full Code    Subjective:   Patient reported that she walked in her room.  She also mentioned that she normally is very particular about her diet at home.  Her blood pressure always comes around 150 the maximum.  She also gets anxious during my conversation regarding follow-up plan    Objective:     Vitals:   Temp (24hrs), Av.9 °F (36.6 °C), Min:97.3 °F (36.3 °C), Max:98.3 °F (36.8 °C)    Temp:  [97.3 °F (36.3 °C)-98.3 °F (36.8 °C)] 98.1 °F (36.7 °C)  HR:  [82-89] 89  Resp:  [16-17] 16  BP: (132-196)/(66-92) 132/66  SpO2:  [98 %-99 %] 98 %  Body mass index is 28.32 kg/m².     Input and Output Summary (last 24 hours):     Intake/Output Summary (Last 24 hours) at 2024 1240  Last data filed at 2024 1735  Gross per 24 hour   Intake 240 ml   Output --   Net 240 ml       Physical Exam:   Constitutional: No acute distress  HEENT: Pallor or icterus  CVS: S1 plus S2  Respiratory: Normal vascular breathe without crackles and wheeze  Gastroenterology: Soft nontender without any palpable mass  Skin: No bruises or ecchymosis  Neurology: No focal logical deficit      Additional Data:     Labs:  Results from last 7 days   Lab Units 24  0839 24  0443 24  1216   WBC  Thousand/uL 4.17*   < > 5.93   HEMOGLOBIN g/dL 12.8   < > 12.3   HEMATOCRIT % 41.4   < > 41.9   PLATELETS Thousands/uL 175   < > 140*   SEGS PCT %  --   --  69   LYMPHO PCT %  --   --  19   MONO PCT %  --   --  9   EOS PCT %  --   --  2    < > = values in this interval not displayed.     Results from last 7 days   Lab Units 05/08/24  0839 05/07/24  0443 05/06/24  1219   SODIUM mmol/L 137   < > 137   POTASSIUM mmol/L 4.0   < > 3.6   CHLORIDE mmol/L 103   < > 104   CO2 mmol/L 27   < > 23   BUN mg/dL 16   < > 9   CREATININE mg/dL 0.72   < > 0.56*   ANION GAP mmol/L 7   < > 10   CALCIUM mg/dL 10.7*   < > 10.1   ALBUMIN g/dL  --   --  4.5   TOTAL BILIRUBIN mg/dL  --   --  0.62   ALK PHOS U/L  --   --  93   ALT U/L  --   --  11   AST U/L  --   --  18   GLUCOSE RANDOM mg/dL 122   < > 103    < > = values in this interval not displayed.             Results from last 7 days   Lab Units 05/07/24  0443   HEMOGLOBIN A1C % 6.1*           Lines/Drains:  Invasive Devices       Peripheral Intravenous Line  Duration             Peripheral IV 05/06/24 Right Antecubital 3 days                          Imaging: No pertinent imaging reviewed.    Recent Cultures (last 7 days):         Last 24 Hours Medication List:   Current Facility-Administered Medications   Medication Dose Route Frequency Provider Last Rate    acetaminophen  650 mg Oral Q6H PRN Dev Onofre MD      ALPRAZolam  0.25 mg Oral BID PRN Raheem Crandall MD      amLODIPine  10 mg Oral Daily Sullyeliceo Wilhelm DO      atorvastatin  20 mg Oral Daily With Dinner Raheem Crandall MD      calcium carbonate  1,000 mg Oral Daily PRN Dev Onofre MD      carvedilol  25 mg Oral BID With Meals Raheem Crandall MD      chlorthalidone  25 mg Oral Daily Raheem Crandall MD      docusate sodium  100 mg Oral BID Dev Onofre MD      heparin (porcine)  5,000 Units Subcutaneous Q8H [facility] Dev Onofre MD      hydrALAZINE  10 mg Intravenous Q6H PRN Raheem Crandall MD       hydrALAZINE  100 mg Oral Q8H [facility] Raheem Crandall MD      ondansetron  4 mg Intravenous Q6H PRN Dev Onofre MD      senna  1 tablet Oral Daily Dev Onofre MD          Today, Patient Was Seen By: Raheem Crandall MD    **Please Note: This note may have been constructed using a voice recognition system.**

## 2024-05-10 ENCOUNTER — TELEPHONE (OUTPATIENT)
Dept: NEPHROLOGY | Facility: CLINIC | Age: 59
End: 2024-05-10

## 2024-05-10 VITALS
RESPIRATION RATE: 16 BRPM | WEIGHT: 165 LBS | TEMPERATURE: 99.7 F | SYSTOLIC BLOOD PRESSURE: 136 MMHG | HEART RATE: 88 BPM | HEIGHT: 64 IN | OXYGEN SATURATION: 98 % | DIASTOLIC BLOOD PRESSURE: 61 MMHG | BODY MASS INDEX: 28.17 KG/M2

## 2024-05-10 LAB
ANION GAP SERPL CALCULATED.3IONS-SCNC: 8 MMOL/L (ref 4–13)
BUN SERPL-MCNC: 25 MG/DL (ref 5–25)
CALCIUM SERPL-MCNC: 10 MG/DL (ref 8.4–10.2)
CHLORIDE SERPL-SCNC: 100 MMOL/L (ref 96–108)
CO2 SERPL-SCNC: 26 MMOL/L (ref 21–32)
CREAT SERPL-MCNC: 0.77 MG/DL (ref 0.6–1.3)
ERYTHROCYTE [DISTWIDTH] IN BLOOD BY AUTOMATED COUNT: 18.5 % (ref 11.6–15.1)
GFR SERPL CREATININE-BSD FRML MDRD: 85 ML/MIN/1.73SQ M
GLUCOSE SERPL-MCNC: 103 MG/DL (ref 65–140)
HCT VFR BLD AUTO: 37.7 % (ref 34.8–46.1)
HGB BLD-MCNC: 11.6 G/DL (ref 11.5–15.4)
MCH RBC QN AUTO: 22.6 PG (ref 26.8–34.3)
MCHC RBC AUTO-ENTMCNC: 30.8 G/DL (ref 31.4–37.4)
MCV RBC AUTO: 74 FL (ref 82–98)
PLATELET # BLD AUTO: 176 THOUSANDS/UL (ref 149–390)
POTASSIUM SERPL-SCNC: 3.5 MMOL/L (ref 3.5–5.3)
RBC # BLD AUTO: 5.13 MILLION/UL (ref 3.81–5.12)
SODIUM SERPL-SCNC: 134 MMOL/L (ref 135–147)
WBC # BLD AUTO: 5.63 THOUSAND/UL (ref 4.31–10.16)

## 2024-05-10 PROCEDURE — 99232 SBSQ HOSP IP/OBS MODERATE 35: CPT | Performed by: INTERNAL MEDICINE

## 2024-05-10 PROCEDURE — 99239 HOSP IP/OBS DSCHRG MGMT >30: CPT | Performed by: STUDENT IN AN ORGANIZED HEALTH CARE EDUCATION/TRAINING PROGRAM

## 2024-05-10 PROCEDURE — 80048 BASIC METABOLIC PNL TOTAL CA: CPT | Performed by: INTERNAL MEDICINE

## 2024-05-10 PROCEDURE — 85027 COMPLETE CBC AUTOMATED: CPT | Performed by: STUDENT IN AN ORGANIZED HEALTH CARE EDUCATION/TRAINING PROGRAM

## 2024-05-10 RX ORDER — CARVEDILOL 25 MG/1
25 TABLET ORAL 2 TIMES DAILY WITH MEALS
Qty: 60 TABLET | Refills: 1 | Status: SHIPPED | OUTPATIENT
Start: 2024-05-10

## 2024-05-10 RX ORDER — CHLORTHALIDONE 25 MG/1
25 TABLET ORAL DAILY
Qty: 30 TABLET | Refills: 0 | Status: SHIPPED | OUTPATIENT
Start: 2024-05-11

## 2024-05-10 RX ORDER — HYDRALAZINE HYDROCHLORIDE 100 MG/1
100 TABLET, FILM COATED ORAL EVERY 8 HOURS SCHEDULED
Qty: 90 TABLET | Refills: 1 | Status: SHIPPED | OUTPATIENT
Start: 2024-05-10

## 2024-05-10 RX ORDER — ATORVASTATIN CALCIUM 20 MG/1
20 TABLET, FILM COATED ORAL
Qty: 30 TABLET | Refills: 0 | Status: SHIPPED | OUTPATIENT
Start: 2024-05-10

## 2024-05-10 RX ORDER — ASPIRIN 81 MG/1
81 TABLET, CHEWABLE ORAL DAILY
Qty: 30 TABLET | Refills: 0 | Status: SHIPPED | OUTPATIENT
Start: 2024-05-10

## 2024-05-10 RX ORDER — ALPRAZOLAM 0.25 MG/1
0.25 TABLET ORAL
Qty: 5 TABLET | Refills: 0 | Status: SHIPPED | OUTPATIENT
Start: 2024-05-10 | End: 2024-05-14

## 2024-05-10 RX ADMIN — CHLORTHALIDONE 25 MG: 25 TABLET ORAL at 09:14

## 2024-05-10 RX ADMIN — CARVEDILOL 25 MG: 12.5 TABLET, FILM COATED ORAL at 15:58

## 2024-05-10 RX ADMIN — CARVEDILOL 25 MG: 12.5 TABLET, FILM COATED ORAL at 09:14

## 2024-05-10 RX ADMIN — HYDRALAZINE HYDROCHLORIDE 100 MG: 25 TABLET ORAL at 06:17

## 2024-05-10 RX ADMIN — HYDRALAZINE HYDROCHLORIDE 100 MG: 25 TABLET ORAL at 13:42

## 2024-05-10 RX ADMIN — AMLODIPINE BESYLATE 10 MG: 10 TABLET ORAL at 09:15

## 2024-05-10 RX ADMIN — ATORVASTATIN CALCIUM 20 MG: 20 TABLET, FILM COATED ORAL at 15:58

## 2024-05-10 NOTE — TELEPHONE ENCOUNTER
I called patient to schedule a Nephrology Consult ref by - Dr. Raheem Crandall for Renal artery stenosis (HCC)left patient on patients answering machine for patient to give the office a call back about scheduling an appointment.  Patient is currently admitted at the hospital

## 2024-05-10 NOTE — PLAN OF CARE
Problem: PAIN - ADULT  Goal: Verbalizes/displays adequate comfort level or baseline comfort level  Description: Interventions:  - Encourage patient to monitor pain and request assistance  - Assess pain using appropriate pain scale  - Administer analgesics based on type and severity of pain and evaluate response  - Implement non-pharmacological measures as appropriate and evaluate response  - Consider cultural and social influences on pain and pain management  - Notify physician/advanced practitioner if interventions unsuccessful or patient reports new pain  Outcome: Progressing     Problem: SAFETY ADULT  Goal: Patient will remain free of falls  Description: INTERVENTIONS:  - Educate patient/family on patient safety including physical limitations  - Instruct patient to call for assistance with activity   - Consult OT/PT to assist with strengthening/mobility   - Keep Call bell within reach  - Keep bed low and locked with side rails adjusted as appropriate  - Keep care items and personal belongings within reach  - Initiate and maintain comfort rounds  - Make Fall Risk Sign visible to staff  - Offer Toileting every 2 Hours, in advance of need  - Initiate/Maintain alarm  - Obtain necessary fall risk management equipment  - Apply yellow socks and bracelet for high fall risk patients  - Consider moving patient to room near nurses station  Outcome: Progressing

## 2024-05-10 NOTE — DISCHARGE SUMMARY
Medical Problems       Resolved Problems  Date Reviewed: 8/2/2022   None       Discharging Physician / Practitioner: Raheem Crandall MD  PCP: No primary care provider on file.  Admission Date:   Admission Orders (From admission, onward)       Ordered        05/06/24 1316  INPATIENT ADMISSION  Once                          Discharge Date: 05/10/24    Consultations During Hospital Stay:  Nephrology, vascular surgery endocrinology    Procedures Performed:   None    Significant Findings / Test Results:   VAS renal artery complete   Final Result            Incidental Findings:   None      Test Results Pending at Discharge (will require follow up):   TSI     Outpatient Tests Requested:  none    Complications:  none    Reason for Admission: Hypertensive urgency  Assessment & Plan  Elevated blood pressure to 257 systolic in ED  In association with elevated troponin   Did respond to IV push of labetalol   Resume home dose amlodipine in AM (took this morning)  Continue IV push labetalol  Goal blood pressure over next 24 hours is around 200 systolic      Elevated troponin  Assessment & Plan  Non chest pain associated elevation in troponin  Secondary to hypertensive urgency  Associated with sinus tachycardia, unclear etiology  Check ECHO, trend troponins, monitor on telemetry              VTE Pharmacologic Prophylaxis:   Moderate Risk (Score 3-4) - Pharmacological DVT Prophylaxis Ordered: heparin.  Code Status: Level 1 - Full Code   Discussion with family: Patient declined call to .      Anticipated Length of Stay: Patient will be admitted on an inpatient basis with an anticipated length of stay of greater than 2 midnights secondary to hypertensive urgency .     Total Time Spent on Date of Encounter in care of patient: 30+ mins. This time was spent on one or more of the following: performing physical exam; counseling and coordination of care; obtaining or reviewing history; documenting in the medical record;  "reviewing/ordering tests, medications or procedures; communicating with other healthcare professionals and discussing with patient's family/caregivers.     Chief Complaint: referral by PCP      History of Present Illness:  aTyler Villalobos is a 59 y.o. female with a PMH of htn who presents with referral by PCP for elevated blood pressure to the 200's. Patient reports no associated symptoms otherwise. Reports compliance with home medication of amlodipine. Reports taking it prior to her PCP visit. She reports her blood pressures are normally in the 140-150's. In the ED, she was noted to be tachycardic with elevated troponin and admitted to [facility] for further management.       Hospital Course:   Tayler Villalobos is a 59 y.o. female patient who originally presented to the hospital on 5/6/2024 due to Hypertension urgency.  Patient was started on 4 different antihypertensive medications to optimize her blood pressure.  Meanwhile Doppler ultrasound showed right renal artery stenosis more than 60%.  Reached out to nephrology recommended to consult with vascular surgery was close recommended to optimize with antihypertensive first and follow-up plan as an outpatient.  On hormonal panel.  Patient has elevated free T4 and low TSH with increased thyroglobulin antibodies and antimicrosomal antibodies.  She recommended to have follow-up on TSI as if it is elevated then patient can be started on low-dose of methimazole.  Patient has been informed to have follow-up with the result and reach out to us or discuss it with endocrinology on her appointment.    Condition at Discharge: good    Discharge Day Visit / Exam:   Subjective:   No overnight event no active complaint  Vitals: Blood Pressure: 153/74 (05/10/24 0703)  Pulse: 93 (05/10/24 0703)  Temperature: 98.1 °F (36.7 °C) (05/10/24 0703)  Temp Source: Oral (05/09/24 2150)  Respirations: 16 (05/09/24 2150)  Height: 5' 4\" (162.6 cm) (05/07/24 0800)  Weight - Scale: 74.8 kg (165 lb) (05/07/24 " 0800)  SpO2: 99 % (05/10/24 0703)  Exam:   Constitutional: No acute distress  HEENT: Pallor or icterus negative  CVS: S1 plus S2  Respiratory: Normal vascular breathe without crackles and wheeze  Gastroenterology: Soft nontender without any palpable mass  Skin: No bruises or ecchymosis  Neurology: No focal logical deficit      Discussion with Family: Patient declined call to .     Discharge instructions/Information to patient and family:   See after visit summary for information provided to patient and family.      Provisions for Follow-Up Care:  See after visit summary for information related to follow-up care and any pertinent home health orders.      Mobility at time of Discharge:   Basic Mobility Inpatient Raw Score: 24  JH-HLM Goal: 8: Walk 250 feet or more  JH-HLM Achieved: 8: Walk 250 feet ot more  HLM Goal achieved. Continue to encourage appropriate mobility.     Disposition:   Home    Planned Readmission: none     Discharge Statement:  I spent 37 minutes discharging the patient. This time was spent on the day of discharge. I had direct contact with the patient on the day of discharge. Greater than 50% of the total time was spent examining patient, answering all patient questions, arranging and discussing plan of care with patient as well as directly providing post-discharge instructions.  Additional time then spent on discharge activities.    Discharge Medications:  See after visit summary for reconciled discharge medications provided to patient and/or family.      **Please Note: This note may have been constructed using a voice recognition system**

## 2024-05-10 NOTE — PROGRESS NOTES
NEPHROLOGY PROGRESS NOTE    Patient: Tayler Villalobos               Sex: female          DOA: 5/6/2024 11:20 AM   YOB: 1965        Age:  59 y.o.        LOS:  LOS: 4 days   5/10/2024    REASON FOR THE CONSULTATION:      Hypertensive urgency    SUBJECTIVE     Patient is laying in bed and offers no complaints.  Happy with improved blood pressure readings noted this morning.    CURRENT MEDICATIONS       Current Facility-Administered Medications:     acetaminophen (TYLENOL) tablet 650 mg, 650 mg, Oral, Q6H PRN, Dev Onofre MD    ALPRAZolam (XANAX) tablet 0.25 mg, 0.25 mg, Oral, BID PRN, Raheem Crandall MD, 0.25 mg at 05/09/24 0951    amLODIPine (NORVASC) tablet 10 mg, 10 mg, Oral, Daily, Sully Wilhelm DO, 10 mg at 05/10/24 0915    atorvastatin (LIPITOR) tablet 20 mg, 20 mg, Oral, Daily With Dinner, Raheem Crandall MD, 20 mg at 05/09/24 1635    calcium carbonate (TUMS) chewable tablet 1,000 mg, 1,000 mg, Oral, Daily PRN, Dev Onofre MD    carvedilol (COREG) tablet 25 mg, 25 mg, Oral, BID With Meals, Raheem Crandall MD, 25 mg at 05/10/24 0914    chlorthalidone tablet 25 mg, 25 mg, Oral, Daily, Raheem Crandall MD, 25 mg at 05/10/24 0914    docusate sodium (COLACE) capsule 100 mg, 100 mg, Oral, BID, Dev Onofre MD    heparin (porcine) subcutaneous injection 5,000 Units, 5,000 Units, Subcutaneous, Q8H FREDRICK, Dev Onofre MD, 5,000 Units at 05/08/24 1456    hydrALAZINE (APRESOLINE) injection 10 mg, 10 mg, Intravenous, Q6H PRN, Raheem Crandall MD, 10 mg at 05/08/24 0320    hydrALAZINE (APRESOLINE) tablet 100 mg, 100 mg, Oral, Q8H FREDRICK, Raheem Crandall MD, 100 mg at 05/10/24 0617    ondansetron (ZOFRAN) injection 4 mg, 4 mg, Intravenous, Q6H PRN, Dev Onofre MD    senna (SENOKOT) tablet 8.6 mg, 1 tablet, Oral, Daily, Dev Onofre MD    REVIEW OF SYSTEMS     Review of Systems   Constitutional: Negative.    HENT: Negative.     Eyes: Negative.    Respiratory: Negative.      Cardiovascular: Negative.    Gastrointestinal: Negative.    Endocrine: Negative.    Genitourinary: Negative.    Musculoskeletal: Negative.    Skin: Negative.    Allergic/Immunologic: Negative.    Neurological: Negative.    Hematological: Negative.    All other systems reviewed and are negative.      OBJECTIVE     Current Weight: Weight - Scale: 74.8 kg (165 lb)  Vitals:    05/10/24 0703   BP: 153/74   Pulse: 93   Resp:    Temp: 98.1 °F (36.7 °C)   SpO2: 99%     Body mass index is 28.32 kg/m².    Intake/Output Summary (Last 24 hours) at 5/10/2024 1224  Last data filed at 5/10/2024 0500  Gross per 24 hour   Intake 700 ml   Output --   Net 700 ml       PHYSICAL EXAMINATION     Physical Exam  Constitutional:       Appearance: She is well-developed.   HENT:      Head: Normocephalic and atraumatic.   Eyes:      Pupils: Pupils are equal, round, and reactive to light.   Cardiovascular:      Rate and Rhythm: Normal rate and regular rhythm.      Heart sounds: Normal heart sounds.   Pulmonary:      Effort: Pulmonary effort is normal.   Abdominal:      General: Bowel sounds are normal.      Palpations: Abdomen is soft.   Musculoskeletal:         General: Normal range of motion.      Cervical back: Neck supple.   Skin:     General: Skin is warm.   Neurological:      Mental Status: She is alert and oriented to person, place, and time.           LAB RESULTS     Results from last 7 days   Lab Units 05/10/24  0523 05/08/24  0839 05/07/24  0443 05/06/24  1219 05/06/24  1216   WBC Thousand/uL 5.63 4.17* 4.33  --  5.93   HEMOGLOBIN g/dL 11.6 12.8 11.4*  --  12.3   HEMATOCRIT % 37.7 41.4 37.9  --  41.9   PLATELETS Thousands/uL 176 175 152  --  140*   POTASSIUM mmol/L 3.5 4.0 3.7 3.6  --    CHLORIDE mmol/L 100 103 104 104  --    CO2 mmol/L 26 27 29 23  --    BUN mg/dL 25 16 12 9  --    CREATININE mg/dL 0.77 0.72 0.62 0.56*  --    EGFR ml/min/1.73sq m 85 92 99 103  --    CALCIUM mg/dL 10.0 10.7* 10.0 10.1  --    MAGNESIUM mg/dL  --   --   2.2  --   --    PHOSPHORUS mg/dL  --  4.7*  --   --   --            RADIOLOGY RESULTS      CONCLUSION:  Impression  The abdominal aorta is widely patent and normal caliber.     RIGHT RENAL:  Evidence of a >60% stenosis in the proximal main renal artery with turbulent  flow distally. Patent renal vein.  Adequate parenchymal flow is noted with a renovascular resistive index of 0.78  .  Renal/Aorta Ratio: 2.7 . The kidney measures approx 9.5 cm     LEFT RENAL:  No evidence of significant arterial occlusive disease in the main renal artery.  Patent renal vein.  Adequate parenchymal flow is noted with a renovascular resistive index of 0.78  .  Renal/Aorta Ratio: 0.92 .  The kidney measures approximately 10 cm.     MESENTERIC:  Celiac and superior mesenteric arteries are patent.    ASSESSMENT/PLAN     69 years old female with past medical history of hypertension, dyslipidemia who presented to our facility with elevated blood pressure.    1.  Hypertensive urgency: Noted to have a systolic blood pressure of 256 m mercury on admission.  Maintained on carvedilol and amlodipine alone at home.  Workup including renal arterial duplex had revealed greater than 60% stenosis in the proximal renal main renal artery with turbulent flow.  Renal aorta ratio was 2.7.  No history of flash pulmonary edema or worsening renal function noted.  Added hydralazine and chlorthalidone with improved blood pressure readings noted today.  Low-salt diet recommended.    Recommend follow-up with nephrology as outpatient.  Acceptable to discharge patient from my end.          Maite Zee MD  Nephrology  5/10/2024

## 2024-05-10 NOTE — NURSING NOTE
Pt discharged home as independent. Continent of bowel and bladder. No complaints of pain. No new skin breakdown. IV removed prior to discharge. Med list and instructions reviewed and given to the patient. No questions or concerns at this time.

## 2024-05-10 NOTE — DISCHARGE INSTR - AVS FIRST PAGE
Please start taking carvedilol 25 mg 1 tablet twice daily, hydralazine 100 mg 1 tablet 3 times a day, chlorthalidone 25 mg once daily, aspirin 81 mg daily, Lipitor 20 mg daily, Xanax 0.25 mg tablet as needed for anxiety  Please continue taking amlodipine.  Please follow-up with vascular surgery, nephrology and endocrinology as an outpatient

## 2024-05-10 NOTE — PLAN OF CARE
Problem: PAIN - ADULT  Goal: Verbalizes/displays adequate comfort level or baseline comfort level  Description: Interventions:  - Encourage patient to monitor pain and request assistance  - Assess pain using appropriate pain scale  - Administer analgesics based on type and severity of pain and evaluate response  - Implement non-pharmacological measures as appropriate and evaluate response  - Consider cultural and social influences on pain and pain management  - Notify physician/advanced practitioner if interventions unsuccessful or patient reports new pain  5/10/2024 1753 by Mireille Clancy RN  Outcome: Adequate for Discharge  5/10/2024 0920 by Mireille Clancy RN  Outcome: Progressing     Problem: INFECTION - ADULT  Goal: Absence or prevention of progression during hospitalization  Description: INTERVENTIONS:  - Assess and monitor for signs and symptoms of infection  - Monitor lab/diagnostic results  - Monitor all insertion sites, i.e. indwelling lines, tubes, and drains  - Monitor endotracheal if appropriate and nasal secretions for changes in amount and color  - [place] appropriate cooling/warming therapies per order  - Administer medications as ordered  - Instruct and encourage patient and family to use good hand hygiene technique  - Identify and instruct in appropriate isolation precautions for identified infection/condition  Outcome: Adequate for Discharge  Goal: Absence of fever/infection during neutropenic period  Description: INTERVENTIONS:  - Monitor WBC    Outcome: Adequate for Discharge     Problem: SAFETY ADULT  Goal: Patient will remain free of falls  Description: INTERVENTIONS:  - Educate patient/family on patient safety including physical limitations  - Instruct patient to call for assistance with activity   - Consult OT/PT to assist with strengthening/mobility   - Keep Call bell within reach  - Keep bed low and locked with side rails adjusted as appropriate  - Keep care items and personal  belongings within reach  - Initiate and maintain comfort rounds  - Make Fall Risk Sign visible to staff  - Offer Toileting every 2 Hours, in advance of need  - Initiate/Maintain alarm  - Obtain necessary fall risk management equipment  - Apply yellow socks and bracelet for high fall risk patients  - Consider moving patient to room near nurses station  5/10/2024 1753 by Mireille Clancy, RN  Outcome: Adequate for Discharge  5/10/2024 0920 by Mireille Clancy RN  Outcome: Progressing  Goal: Maintain or return to baseline ADL function  Description: INTERVENTIONS:  -  Assess patient's ability to carry out ADLs; assess patient's baseline for ADL function and identify physical deficits which impact ability to perform ADLs (bathing, care of mouth/teeth, toileting, grooming, dressing, etc.)  - Assess/evaluate cause of self-care deficits   - Assess range of motion  - Assess patient's mobility; develop plan if impaired  - Assess patient's need for assistive devices and provide as appropriate  - Encourage maximum independence but intervene and supervise when necessary  - Involve family in performance of ADLs  - Assess for home care needs following discharge   - Consider OT consult to assist with ADL evaluation and planning for discharge  - Provide patient education as appropriate  Outcome: Adequate for Discharge  Goal: Maintains/Returns to pre admission functional level  Description: INTERVENTIONS:  - Perform AM-PAC 6 Click Basic Mobility/ Daily Activity assessment daily.  - Set and communicate daily mobility goal to care team and patient/family/caregiver.   - Collaborate with rehabilitation services on mobility goals if consulted  - Perform Range of Motion 3 times a day.  - Reposition patient every 2 hours.  - Dangle patient 3 times a day  - Stand patient 3 times a day  - Ambulate patient 3 times a day  - Out of bed to chair 3 times a day   - Out of bed for meals 3 times a day  - Out of bed for toileting  - Record patient  progress and toleration of activity level   Outcome: Adequate for Discharge     Problem: DISCHARGE PLANNING  Goal: Discharge to home or other facility with appropriate resources  Description: INTERVENTIONS:  - Identify barriers to discharge w/patient and caregiver  - Arrange for needed discharge resources and transportation as appropriate  - Identify discharge learning needs (meds, wound care, etc.)  - Arrange for interpretive services to assist at discharge as needed  - Refer to Case Management Department for coordinating discharge planning if the patient needs post-hospital services based on physician/advanced practitioner order or complex needs related to functional status, cognitive ability, or social support system  Outcome: Adequate for Discharge     Problem: Knowledge Deficit  Goal: Patient/family/caregiver demonstrates understanding of disease process, treatment plan, medications, and discharge instructions  Description: Complete learning assessment and assess knowledge base.  Interventions:  - Provide teaching at level of understanding  - Provide teaching via preferred learning methods  Outcome: Adequate for Discharge

## 2024-05-11 LAB — TSI SER-ACNC: 1 IU/L (ref 0–0.55)

## 2024-05-13 NOTE — RESULT ENCOUNTER NOTE
Please call the patient regarding labs -this patient was seen in the hospital for hyperthyroidism-lab testing came back consistent with Graves' disease and she needs to be started on treatment.  Please schedule her for a same-day slot within this week as she needs to be seen

## 2024-05-15 NOTE — TELEPHONE ENCOUNTER
Called patient to schedule appt - left message    From: Jovita Pimentel <[email or web address]>  Sent: Wednesday, May 8, 2024 4:30 PM  To: Vascular Scheduling <[email or web address]>  Subject: Tayler Villalobos 1965         Hello,          This patient will need 4-6 week OV with a surgeon for right renal artery stenosis.          ThanksCiara

## 2024-05-20 ENCOUNTER — OFFICE VISIT (OUTPATIENT)
Age: 59
End: 2024-05-20
Payer: COMMERCIAL

## 2024-05-20 ENCOUNTER — TELEPHONE (OUTPATIENT)
Dept: ADMINISTRATIVE | Facility: OTHER | Age: 59
End: 2024-05-20

## 2024-05-20 ENCOUNTER — TELEPHONE (OUTPATIENT)
Age: 59
End: 2024-05-20

## 2024-05-20 VITALS
OXYGEN SATURATION: 99 % | HEART RATE: 94 BPM | RESPIRATION RATE: 18 BRPM | BODY MASS INDEX: 27.49 KG/M2 | HEIGHT: 64 IN | WEIGHT: 161 LBS | SYSTOLIC BLOOD PRESSURE: 160 MMHG | TEMPERATURE: 98.4 F | DIASTOLIC BLOOD PRESSURE: 84 MMHG

## 2024-05-20 DIAGNOSIS — I70.1 RENAL ARTERY STENOSIS (HCC): ICD-10-CM

## 2024-05-20 DIAGNOSIS — D50.9 IRON DEFICIENCY ANEMIA, UNSPECIFIED IRON DEFICIENCY ANEMIA TYPE: ICD-10-CM

## 2024-05-20 DIAGNOSIS — R79.89 ELEVATED SERUM FREE T4 LEVEL: ICD-10-CM

## 2024-05-20 DIAGNOSIS — I10 BENIGN ESSENTIAL HYPERTENSION: ICD-10-CM

## 2024-05-20 DIAGNOSIS — E78.5 DYSLIPIDEMIA: ICD-10-CM

## 2024-05-20 DIAGNOSIS — R73.03 PREDIABETES: ICD-10-CM

## 2024-05-20 DIAGNOSIS — Z12.4 SCREENING FOR CERVICAL CANCER: ICD-10-CM

## 2024-05-20 DIAGNOSIS — Z00.00 ANNUAL PHYSICAL EXAM: Primary | ICD-10-CM

## 2024-05-20 PROCEDURE — 99396 PREV VISIT EST AGE 40-64: CPT

## 2024-05-20 PROCEDURE — 99214 OFFICE O/P EST MOD 30 MIN: CPT

## 2024-05-20 NOTE — TELEPHONE ENCOUNTER
----- Message from Sameera Grady PA-C sent at 5/20/2024  9:34 AM EDT -----  Can we please send a message to the care Team to have the cervical cancer screening removed.  She had a total hysterectomy last year.  In addition, her last Pap smear was on10/31/2022 and was negative.

## 2024-05-20 NOTE — ASSESSMENT & PLAN NOTE
History of iron deficiency anemia secondary to blood loss from heavy menstrual periods.  Patient had total hysterectomy last year.  Recent blood counts stable.  Will continue to monitor.

## 2024-05-20 NOTE — TELEPHONE ENCOUNTER
----- Message from Avani PEARL sent at 5/20/2024  9:48 AM EDT -----  Can we please send a message to the care Team to have the cervical cancer screening removed.  She had a total hysterectomy last year.  In addition, her last Pap smear was on10/31/2022 and was negative.

## 2024-05-20 NOTE — ASSESSMENT & PLAN NOTE
Blood pressure elevated in office.  Patient states this is due to whitecoat syndrome and that her pressures have been good at home.  I recommended she keep a log of her blood pressures at home daily and bring them to her for follow-up appointment in our office in 1 month.  In the meantime I want her to follow-up with endocrinology regarding hypothyroidism.  We will touch base in 1 month and reevaluate need for additional blood pressure medications.

## 2024-05-20 NOTE — ASSESSMENT & PLAN NOTE
Endocrinology was in touch with the patient during her hospitalization and recommended follow-up upon her discharge as an outpatient.  Patient has not seen endocrine yet.  I placed referral and recommended short-term follow-up with endocrinology regarding her hypothyroidism and abnormal thyroid labs during hospitalization.

## 2024-05-20 NOTE — ASSESSMENT & PLAN NOTE
Stable.  Continue healthy well-balanced diet low in saturated fats, trans fats, and carbohydrates and continue routine physical activity.  Will continue to monitor.

## 2024-05-20 NOTE — ASSESSMENT & PLAN NOTE
Cholesterol panel during recent hospitalization looked good overall.  Not currently on medication.  Continue healthy well-balanced diet and we will continue to monitor.

## 2024-05-20 NOTE — TELEPHONE ENCOUNTER
Upon review of the In Basket request we   were able to note that a Health Maintenance (HM) modifier was needed to update HM.  HM Modifiers should be adjusted by the office staff. If you require additional support on adjusting modifiers, please email our Practice Liaisons.       Any additional questions or concerns should be emailed to the Practice Liaisons via the appropriate education email address, please do not reply via In Basket.    Thank you  Roxy Solorzano MA

## 2024-05-20 NOTE — PROGRESS NOTES
Adult Annual Physical  Name: Tayler Villalobos      : 1965      MRN: [id number]  Encounter Provider: Sameera Grady PA-C  Encounter Date: 2024   Encounter department: [facility]    Assessment & Plan   1. Annual physical exam  Patient is due for multiple screenings including mammogram and colon cancer screening, but would like to follow up with nephrology and endocrinology from her hospitalization first before addressing these. We will see her back in the office in one month for re-evaluation of blood pressure and follow up for screenings.   2. Elevated serum free T4 level  Assessment & Plan:  Endocrinology was in touch with the patient during her hospitalization and recommended follow-up upon her discharge as an outpatient.  Patient has not seen endocrine yet.  I placed referral and recommended short-term follow-up with endocrinology regarding her hypothyroidism and abnormal thyroid labs during hospitalization.  Orders:  -     Ambulatory Referral to Endocrinology; Future  3. Benign essential hypertension  Assessment & Plan:  Blood pressure elevated in office.  Patient states this is due to whitecoat syndrome and that her pressures have been good at home.  I recommended she keep a log of her blood pressures at home daily and bring them to her for follow-up appointment in our office in 1 month.  In the meantime I want her to follow-up with endocrinology regarding hypothyroidism.  We will touch base in 1 month and reevaluate need for additional blood pressure medications.   4. Renal artery stenosis (HCC)  -     Ambulatory Referral to Nephrology; Future  5. Dyslipidemia  Assessment & Plan:  Cholesterol panel during recent hospitalization looked good overall.  Not currently on medication.  Continue healthy well-balanced diet and we will continue to monitor.  6. Iron deficiency anemia, unspecified iron deficiency anemia type  Assessment & Plan:  History of iron deficiency anemia  secondary to blood loss from heavy menstrual periods.  Patient had total hysterectomy last year.  Recent blood counts stable.  Will continue to monitor.  7. Prediabetes  Assessment & Plan:  Stable.  Continue healthy well-balanced diet low in saturated fats, trans fats, and carbohydrates and continue routine physical activity.  Will continue to monitor.  8. Screening for cervical cancer    Immunizations and preventive care screenings were discussed with patient today. Appropriate education was printed on patient's after visit summary.    Counseling:  Alcohol/drug use: discussed moderation in alcohol intake, the recommendations for healthy alcohol use, and avoidance of illicit drug use.  Dental Health: discussed importance of regular tooth brushing, flossing, and dental visits.  Exercise: the importance of regular exercise/physical activity was discussed. Recommend exercise 3-5 times per week for at least 30 minutes.          History of Present Illness   {Disappearing Hyperlinks I Encounters * My Last Note * Since Last Visit * History :12906}  Adult Annual Physical:  Patient presents for annual physical.     Diet and Physical Activity:  - Diet/Nutrition: well balanced diet.  - Exercise: 5-7 times a week on average and walking.    General Health:  - Sleep: sleeps well.  - Hearing: normal hearing bilateral ears.  - Vision: no vision problems.  - Dental: brushes teeth twice daily.    Tayler is a 59-year-old female presenting to the office to establish care.  I saw her earlier this month and sent her to the ER for hypertensive urgency.  She was then admitted to the hospital for several days.  They started her on several antihypertensive medications.  She was also found to have renal artery stenosis and abnormal thyroid hormones consistent with hyperthyroidism.  It was recommended that she have outpatient follow-up with endocrinology and nephrology.  She has been taking carvedilol, chlorthalidone, and hydralazine as  "prescribed for her hypertension.  In the office her blood pressure is elevated today, but she states that she has whitecoat syndrome and it is always elevated in the office.  She has been monitoring her blood pressure daily at home and has been seeing readings in the 130s/80s and as low as 123/66 yesterday morning.  In the hospital it was recommended that she start on a daily aspirin and Lipitor, but she has not started these medications yet.  She is due for several screenings including colonoscopy and cervical cancer screening, but with all the follow-up she that she has to do after her hospitalization, she would prefer to hold off on these for now.  She had a total abdominal hysterectomy done last year for heavy menstrual bleeding with bilateral salpingectomy and left oophorectomy.  They left her right ovary in place.  Review of Systems   Constitutional:  Negative for chills and fever.   HENT:  Negative for congestion and sore throat.    Eyes:  Negative for pain and visual disturbance.   Respiratory:  Negative for cough and shortness of breath.    Cardiovascular:  Negative for chest pain and palpitations.   Gastrointestinal:  Negative for abdominal pain, constipation and diarrhea.   Genitourinary:  Negative for dysuria and hematuria.   Musculoskeletal:  Negative for arthralgias and myalgias.   Skin:  Negative for color change and rash.   Neurological:  Negative for dizziness and headaches.         Objective   {Disappearing Hyperlinks   Review Vitals * Enter New Vitals * Results Review * Labs * Imaging * Cardiology * Procedures * Lung Cancer Screening :41312}  /84 (BP Location: Left arm, Patient Position: Sitting, Cuff Size: Standard)   Pulse 94   Temp 98.4 °F (36.9 °C) (Tympanic)   Resp 18   Ht 5' 4\" (1.626 m)   Wt 73 kg (161 lb)   LMP 08/20/2020   SpO2 99%   BMI 27.64 kg/m²     Physical Exam  Vitals and nursing note reviewed.   Constitutional:       General: She is not in acute distress.     " Appearance: She is well-developed.   HENT:      Head: Normocephalic and atraumatic.      Right Ear: Tympanic membrane normal.      Left Ear: Tympanic membrane normal.      Nose: Nose normal.      Mouth/Throat:      Mouth: Mucous membranes are moist.      Pharynx: Oropharynx is clear. No oropharyngeal exudate.   Eyes:      Extraocular Movements: Extraocular movements intact.      Conjunctiva/sclera: Conjunctivae normal.   Cardiovascular:      Rate and Rhythm: Normal rate and regular rhythm.      Heart sounds: No murmur heard.  Pulmonary:      Effort: Pulmonary effort is normal. No respiratory distress.      Breath sounds: Normal breath sounds. No wheezing, rhonchi or rales.   Abdominal:      Palpations: Abdomen is soft.      Tenderness: There is no abdominal tenderness.   Musculoskeletal:         General: No swelling.      Cervical back: Neck supple.   Skin:     General: Skin is warm and dry.      Capillary Refill: Capillary refill takes less than 2 seconds.   Neurological:      General: No focal deficit present.      Mental Status: She is alert.   Psychiatric:         Mood and Affect: Mood normal.       Administrative Statements {Disappearing Hyperlinks I  [id number]

## 2024-10-22 ENCOUNTER — VBI (OUTPATIENT)
Dept: ADMINISTRATIVE | Facility: OTHER | Age: 59
End: 2024-10-22

## 2024-10-22 NOTE — TELEPHONE ENCOUNTER
10/22/24 7:01 AM     Chart reviewed for   [facility]    ; nothing is submitted to the patient's insurance at this time.     Nazanin Adam   [facility]

## 2024-11-25 ENCOUNTER — VBI (OUTPATIENT)
Dept: ADMINISTRATIVE | Facility: OTHER | Age: 59
End: 2024-11-25

## 2024-11-25 NOTE — TELEPHONE ENCOUNTER
11/25/24 7:34 AM     Chart reviewed for Mammogram ; nothing is submitted to the patient's insurance at this time.     Nazanin Adam   [facility]

## 2024-12-20 ENCOUNTER — VBI (OUTPATIENT)
Dept: ADMINISTRATIVE | Facility: OTHER | Age: 59
End: 2024-12-20

## 2024-12-20 NOTE — TELEPHONE ENCOUNTER
12/20/24 8:52 AM     Chart reviewed for [facility]: Colonoscopy ; nothing is submitted to the patient's insurance at this time.     Nazanin Adam   [facility]

## 2025-05-14 ENCOUNTER — VBI (OUTPATIENT)
Dept: ADMINISTRATIVE | Facility: OTHER | Age: 60
End: 2025-05-14

## 2025-05-14 NOTE — TELEPHONE ENCOUNTER
05/14/25 1:46 PM     Chart reviewed for [facility]: Colonoscopy ; nothing is submitted to the patient's insurance at this time.     Nazanin Adam   [facility]

## 2025-06-23 ENCOUNTER — TELEPHONE (OUTPATIENT)
Age: 60
End: 2025-06-23

## 2025-07-16 ENCOUNTER — TELEPHONE (OUTPATIENT)
Age: 60
End: 2025-07-16